# Patient Record
Sex: FEMALE | Race: WHITE | NOT HISPANIC OR LATINO | URBAN - METROPOLITAN AREA
[De-identification: names, ages, dates, MRNs, and addresses within clinical notes are randomized per-mention and may not be internally consistent; named-entity substitution may affect disease eponyms.]

---

## 2022-05-06 PROBLEM — E66.812 CLASS 2 SEVERE OBESITY DUE TO EXCESS CALORIES WITH SERIOUS COMORBIDITY AND BODY MASS INDEX (BMI) OF 37.0 TO 37.9 IN ADULT: Status: ACTIVE | Noted: 2022-05-06

## 2022-05-20 ENCOUNTER — OFFICE (OUTPATIENT)
Dept: URBAN - METROPOLITAN AREA CLINIC 64 | Facility: CLINIC | Age: 47
End: 2022-05-20

## 2022-05-20 VITALS
HEART RATE: 81 BPM | WEIGHT: 213 LBS | SYSTOLIC BLOOD PRESSURE: 129 MMHG | HEIGHT: 64 IN | DIASTOLIC BLOOD PRESSURE: 93 MMHG

## 2022-05-20 DIAGNOSIS — K85.90 ACUTE PANCREATITIS WITHOUT NECROSIS OR INFECTION, UNSPECIFIE: ICD-10-CM

## 2022-05-20 PROCEDURE — 99204 OFFICE O/P NEW MOD 45 MIN: CPT | Performed by: NURSE PRACTITIONER

## 2022-05-20 RX ORDER — COLESEVELAM HYDROCHLORIDE 625 MG/1
625 TABLET, FILM COATED ORAL
Qty: 60 | Refills: 11 | Status: COMPLETED
Start: 2022-05-20 | End: 2022-12-28

## 2022-05-30 ENCOUNTER — INPATIENT HOSPITAL (OUTPATIENT)
Dept: URBAN - METROPOLITAN AREA HOSPITAL 84 | Facility: HOSPITAL | Age: 47
End: 2022-05-30
Payer: COMMERCIAL

## 2022-05-30 DIAGNOSIS — R10.11 RIGHT UPPER QUADRANT PAIN: ICD-10-CM

## 2022-05-30 DIAGNOSIS — F17.200 NICOTINE DEPENDENCE, UNSPECIFIED, UNCOMPLICATED: ICD-10-CM

## 2022-05-30 DIAGNOSIS — R10.13 EPIGASTRIC PAIN: ICD-10-CM

## 2022-05-30 DIAGNOSIS — K29.50 UNSPECIFIED CHRONIC GASTRITIS WITHOUT BLEEDING: ICD-10-CM

## 2022-05-30 PROCEDURE — 43239 EGD BIOPSY SINGLE/MULTIPLE: CPT | Performed by: INTERNAL MEDICINE

## 2022-05-31 ENCOUNTER — INPATIENT HOSPITAL (OUTPATIENT)
Dept: URBAN - METROPOLITAN AREA HOSPITAL 84 | Facility: HOSPITAL | Age: 47
End: 2022-05-31
Payer: COMMERCIAL

## 2022-05-31 DIAGNOSIS — K85.90 ACUTE PANCREATITIS WITHOUT NECROSIS OR INFECTION, UNSPECIFIE: ICD-10-CM

## 2022-05-31 DIAGNOSIS — R10.11 RIGHT UPPER QUADRANT PAIN: ICD-10-CM

## 2022-05-31 DIAGNOSIS — Z72.0 TOBACCO USE: ICD-10-CM

## 2022-05-31 DIAGNOSIS — R10.13 EPIGASTRIC PAIN: ICD-10-CM

## 2022-05-31 DIAGNOSIS — K58.0 IRRITABLE BOWEL SYNDROME WITH DIARRHEA: ICD-10-CM

## 2022-05-31 PROCEDURE — 99231 SBSQ HOSP IP/OBS SF/LOW 25: CPT | Performed by: NURSE PRACTITIONER

## 2022-06-01 ENCOUNTER — INPATIENT HOSPITAL (OUTPATIENT)
Dept: URBAN - METROPOLITAN AREA HOSPITAL 84 | Facility: HOSPITAL | Age: 47
End: 2022-06-01
Payer: COMMERCIAL

## 2022-06-01 DIAGNOSIS — K85.90 ACUTE PANCREATITIS WITHOUT NECROSIS OR INFECTION, UNSPECIFIE: ICD-10-CM

## 2022-06-01 PROCEDURE — 99232 SBSQ HOSP IP/OBS MODERATE 35: CPT | Performed by: NURSE PRACTITIONER

## 2022-12-28 ENCOUNTER — OFFICE (OUTPATIENT)
Dept: URBAN - METROPOLITAN AREA CLINIC 64 | Facility: CLINIC | Age: 47
End: 2022-12-28
Payer: COMMERCIAL

## 2022-12-28 VITALS
HEIGHT: 64 IN | DIASTOLIC BLOOD PRESSURE: 70 MMHG | HEART RATE: 101 BPM | SYSTOLIC BLOOD PRESSURE: 98 MMHG | WEIGHT: 203 LBS

## 2022-12-28 DIAGNOSIS — R11.0 NAUSEA: ICD-10-CM

## 2022-12-28 DIAGNOSIS — R10.13 EPIGASTRIC PAIN: ICD-10-CM

## 2022-12-28 DIAGNOSIS — R19.7 DIARRHEA, UNSPECIFIED: ICD-10-CM

## 2022-12-28 DIAGNOSIS — K85.90 ACUTE PANCREATITIS WITHOUT NECROSIS OR INFECTION, UNSPECIFIE: ICD-10-CM

## 2022-12-28 PROCEDURE — 99214 OFFICE O/P EST MOD 30 MIN: CPT

## 2022-12-28 RX ORDER — AMITRIPTYLINE HYDROCHLORIDE 25 MG/1
25 TABLET, FILM COATED ORAL
Qty: 30 | Refills: 5 | Status: COMPLETED
Start: 2022-12-28 | End: 2023-12-22

## 2023-01-27 ENCOUNTER — INPATIENT HOSPITAL (OUTPATIENT)
Dept: URBAN - METROPOLITAN AREA HOSPITAL 84 | Facility: HOSPITAL | Age: 48
End: 2023-01-27

## 2023-01-27 DIAGNOSIS — Z90.49 ACQUIRED ABSENCE OF OTHER SPECIFIED PARTS OF DIGESTIVE TRACT: ICD-10-CM

## 2023-01-27 DIAGNOSIS — R19.7 DIARRHEA, UNSPECIFIED: ICD-10-CM

## 2023-01-27 DIAGNOSIS — F17.200 NICOTINE DEPENDENCE, UNSPECIFIED, UNCOMPLICATED: ICD-10-CM

## 2023-01-27 DIAGNOSIS — R11.2 NAUSEA WITH VOMITING, UNSPECIFIED: ICD-10-CM

## 2023-01-27 DIAGNOSIS — K85.90 ACUTE PANCREATITIS WITHOUT NECROSIS OR INFECTION, UNSPECIFIE: ICD-10-CM

## 2023-01-27 PROCEDURE — 99254 IP/OBS CNSLTJ NEW/EST MOD 60: CPT | Performed by: NURSE PRACTITIONER

## 2023-01-28 ENCOUNTER — INPATIENT HOSPITAL (OUTPATIENT)
Dept: URBAN - METROPOLITAN AREA HOSPITAL 84 | Facility: HOSPITAL | Age: 48
End: 2023-01-28

## 2023-01-28 DIAGNOSIS — K85.90 ACUTE PANCREATITIS WITHOUT NECROSIS OR INFECTION, UNSPECIFIE: ICD-10-CM

## 2023-01-28 PROCEDURE — 99232 SBSQ HOSP IP/OBS MODERATE 35: CPT | Performed by: NURSE PRACTITIONER

## 2023-02-09 PROBLEM — E66.811 CLASS 1 OBESITY DUE TO EXCESS CALORIES WITH SERIOUS COMORBIDITY AND BODY MASS INDEX (BMI) OF 34.0 TO 34.9 IN ADULT: Status: ACTIVE | Noted: 2022-05-06

## 2023-03-07 ENCOUNTER — ON CAMPUS - OUTPATIENT (OUTPATIENT)
Dept: URBAN - METROPOLITAN AREA HOSPITAL 85 | Facility: HOSPITAL | Age: 48
End: 2023-03-07

## 2023-03-07 DIAGNOSIS — K63.89 OTHER SPECIFIED DISEASES OF INTESTINE: ICD-10-CM

## 2023-03-07 DIAGNOSIS — K52.9 NONINFECTIVE GASTROENTERITIS AND COLITIS, UNSPECIFIED: ICD-10-CM

## 2023-03-07 PROCEDURE — 45380 COLONOSCOPY AND BIOPSY: CPT | Performed by: INTERNAL MEDICINE

## 2023-03-21 ENCOUNTER — OFFICE (OUTPATIENT)
Dept: URBAN - METROPOLITAN AREA CLINIC 64 | Facility: CLINIC | Age: 48
End: 2023-03-21

## 2023-03-21 VITALS
SYSTOLIC BLOOD PRESSURE: 112 MMHG | WEIGHT: 207 LBS | HEIGHT: 64 IN | HEART RATE: 89 BPM | DIASTOLIC BLOOD PRESSURE: 79 MMHG

## 2023-03-21 DIAGNOSIS — R10.13 EPIGASTRIC PAIN: ICD-10-CM

## 2023-03-21 DIAGNOSIS — R74.8 ABNORMAL LEVELS OF OTHER SERUM ENZYMES: ICD-10-CM

## 2023-03-21 DIAGNOSIS — K85.90 ACUTE PANCREATITIS WITHOUT NECROSIS OR INFECTION, UNSPECIFIE: ICD-10-CM

## 2023-03-21 DIAGNOSIS — R19.7 DIARRHEA, UNSPECIFIED: ICD-10-CM

## 2023-03-21 PROCEDURE — 99214 OFFICE O/P EST MOD 30 MIN: CPT | Performed by: INTERNAL MEDICINE

## 2023-03-21 RX ORDER — DICYCLOMINE HYDROCHLORIDE 10 MG/1
40 CAPSULE ORAL
Qty: 60 | Refills: 11 | Status: COMPLETED
Start: 2023-03-21 | End: 2023-12-22

## 2023-06-06 ENCOUNTER — OFFICE (OUTPATIENT)
Dept: URBAN - METROPOLITAN AREA CLINIC 64 | Facility: CLINIC | Age: 48
End: 2023-06-06
Payer: OTHER GOVERNMENT

## 2023-06-06 VITALS
SYSTOLIC BLOOD PRESSURE: 112 MMHG | HEART RATE: 82 BPM | WEIGHT: 212 LBS | HEIGHT: 64 IN | DIASTOLIC BLOOD PRESSURE: 79 MMHG

## 2023-06-06 DIAGNOSIS — R19.7 DIARRHEA, UNSPECIFIED: ICD-10-CM

## 2023-06-06 DIAGNOSIS — K85.90 ACUTE PANCREATITIS WITHOUT NECROSIS OR INFECTION, UNSPECIFIE: ICD-10-CM

## 2023-06-06 DIAGNOSIS — F17.200 NICOTINE DEPENDENCE, UNSPECIFIED, UNCOMPLICATED: ICD-10-CM

## 2023-06-06 DIAGNOSIS — R10.13 EPIGASTRIC PAIN: ICD-10-CM

## 2023-06-06 DIAGNOSIS — R11.0 NAUSEA: ICD-10-CM

## 2023-06-06 DIAGNOSIS — Z90.49 ACQUIRED ABSENCE OF OTHER SPECIFIED PARTS OF DIGESTIVE TRACT: ICD-10-CM

## 2023-06-06 PROCEDURE — 99214 OFFICE O/P EST MOD 30 MIN: CPT

## 2023-07-15 ENCOUNTER — INPATIENT HOSPITAL (OUTPATIENT)
Dept: URBAN - METROPOLITAN AREA HOSPITAL 84 | Facility: HOSPITAL | Age: 48
End: 2023-07-15
Payer: COMMERCIAL

## 2023-07-15 DIAGNOSIS — K85.90 ACUTE PANCREATITIS WITHOUT NECROSIS OR INFECTION, UNSPECIFIE: ICD-10-CM

## 2023-07-15 PROCEDURE — 99221 1ST HOSP IP/OBS SF/LOW 40: CPT | Performed by: NURSE PRACTITIONER

## 2023-08-26 ENCOUNTER — ON CAMPUS - OUTPATIENT (OUTPATIENT)
Dept: URBAN - METROPOLITAN AREA HOSPITAL 85 | Facility: HOSPITAL | Age: 48
End: 2023-08-26
Payer: COMMERCIAL

## 2023-08-26 DIAGNOSIS — Z90.49 ACQUIRED ABSENCE OF OTHER SPECIFIED PARTS OF DIGESTIVE TRACT: ICD-10-CM

## 2023-08-26 DIAGNOSIS — K59.00 CONSTIPATION, UNSPECIFIED: ICD-10-CM

## 2023-08-26 DIAGNOSIS — K86.1 OTHER CHRONIC PANCREATITIS: ICD-10-CM

## 2023-08-26 DIAGNOSIS — R10.9 UNSPECIFIED ABDOMINAL PAIN: ICD-10-CM

## 2023-08-26 PROCEDURE — 99214 OFFICE O/P EST MOD 30 MIN: CPT | Performed by: NURSE PRACTITIONER

## 2023-08-27 ENCOUNTER — ON CAMPUS - OUTPATIENT (OUTPATIENT)
Dept: URBAN - METROPOLITAN AREA HOSPITAL 85 | Facility: HOSPITAL | Age: 48
End: 2023-08-27
Payer: COMMERCIAL

## 2023-08-27 DIAGNOSIS — R10.9 UNSPECIFIED ABDOMINAL PAIN: ICD-10-CM

## 2023-08-27 DIAGNOSIS — K86.1 OTHER CHRONIC PANCREATITIS: ICD-10-CM

## 2023-08-27 DIAGNOSIS — Z90.49 ACQUIRED ABSENCE OF OTHER SPECIFIED PARTS OF DIGESTIVE TRACT: ICD-10-CM

## 2023-08-27 DIAGNOSIS — K59.00 CONSTIPATION, UNSPECIFIED: ICD-10-CM

## 2023-08-27 PROCEDURE — 99212 OFFICE O/P EST SF 10 MIN: CPT | Performed by: NURSE PRACTITIONER

## 2023-12-04 ENCOUNTER — ON CAMPUS - OUTPATIENT (OUTPATIENT)
Dept: URBAN - METROPOLITAN AREA HOSPITAL 85 | Facility: HOSPITAL | Age: 48
End: 2023-12-04
Payer: COMMERCIAL

## 2023-12-04 DIAGNOSIS — K29.50 UNSPECIFIED CHRONIC GASTRITIS WITHOUT BLEEDING: ICD-10-CM

## 2023-12-04 DIAGNOSIS — Z90.49 ACQUIRED ABSENCE OF OTHER SPECIFIED PARTS OF DIGESTIVE TRACT: ICD-10-CM

## 2023-12-04 DIAGNOSIS — K85.90 ACUTE PANCREATITIS WITHOUT NECROSIS OR INFECTION, UNSPECIFIE: ICD-10-CM

## 2023-12-04 DIAGNOSIS — K86.1 OTHER CHRONIC PANCREATITIS: ICD-10-CM

## 2023-12-04 PROCEDURE — 43259 EGD US EXAM DUODENUM/JEJUNUM: CPT | Performed by: INTERNAL MEDICINE

## 2023-12-22 ENCOUNTER — OFFICE (OUTPATIENT)
Dept: URBAN - METROPOLITAN AREA CLINIC 64 | Facility: CLINIC | Age: 48
End: 2023-12-22
Payer: COMMERCIAL

## 2023-12-22 VITALS
DIASTOLIC BLOOD PRESSURE: 82 MMHG | HEIGHT: 64 IN | WEIGHT: 221 LBS | SYSTOLIC BLOOD PRESSURE: 113 MMHG | HEART RATE: 78 BPM

## 2023-12-22 DIAGNOSIS — K86.1 OTHER CHRONIC PANCREATITIS: ICD-10-CM

## 2023-12-22 DIAGNOSIS — R19.4 CHANGE IN BOWEL HABIT: ICD-10-CM

## 2023-12-22 DIAGNOSIS — R11.2 NAUSEA WITH VOMITING, UNSPECIFIED: ICD-10-CM

## 2023-12-22 PROCEDURE — 99214 OFFICE O/P EST MOD 30 MIN: CPT

## 2023-12-22 RX ORDER — COLESEVELAM HYDROCHLORIDE 625 MG/1
3750 TABLET, COATED ORAL
Qty: 180 | Refills: 6 | Status: COMPLETED
Start: 2023-12-22 | End: 2024-07-01

## 2023-12-22 RX ORDER — ONDANSETRON 4 MG/1
12 TABLET, ORALLY DISINTEGRATING ORAL
Qty: 90 | Refills: 4 | Status: ACTIVE
Start: 2023-12-22

## 2023-12-22 RX ORDER — HYOSCYAMINE SULFATE 0.12 MG/1
0.5 TABLET ORAL; SUBLINGUAL
Qty: 60 | Refills: 5 | Status: ACTIVE
Start: 2023-12-22

## 2024-01-18 ENCOUNTER — INPATIENT HOSPITAL (OUTPATIENT)
Dept: URBAN - METROPOLITAN AREA HOSPITAL 84 | Facility: HOSPITAL | Age: 49
End: 2024-01-18
Payer: COMMERCIAL

## 2024-01-18 DIAGNOSIS — K76.0 FATTY (CHANGE OF) LIVER, NOT ELSEWHERE CLASSIFIED: ICD-10-CM

## 2024-01-18 DIAGNOSIS — K85.90 ACUTE PANCREATITIS WITHOUT NECROSIS OR INFECTION, UNSPECIFIE: ICD-10-CM

## 2024-01-18 DIAGNOSIS — Z90.49 ACQUIRED ABSENCE OF OTHER SPECIFIED PARTS OF DIGESTIVE TRACT: ICD-10-CM

## 2024-01-18 DIAGNOSIS — R11.2 NAUSEA WITH VOMITING, UNSPECIFIED: ICD-10-CM

## 2024-01-18 DIAGNOSIS — R74.01 ELEVATION OF LEVELS OF LIVER TRANSAMINASE LEVELS: ICD-10-CM

## 2024-01-18 DIAGNOSIS — K86.1 OTHER CHRONIC PANCREATITIS: ICD-10-CM

## 2024-01-18 PROCEDURE — 99223 1ST HOSP IP/OBS HIGH 75: CPT | Performed by: NURSE PRACTITIONER

## 2024-01-19 ENCOUNTER — ON CAMPUS - OUTPATIENT (OUTPATIENT)
Dept: URBAN - METROPOLITAN AREA HOSPITAL 85 | Facility: HOSPITAL | Age: 49
End: 2024-01-19
Payer: COMMERCIAL

## 2024-01-19 DIAGNOSIS — K76.0 FATTY (CHANGE OF) LIVER, NOT ELSEWHERE CLASSIFIED: ICD-10-CM

## 2024-01-19 DIAGNOSIS — Z90.49 ACQUIRED ABSENCE OF OTHER SPECIFIED PARTS OF DIGESTIVE TRACT: ICD-10-CM

## 2024-01-19 DIAGNOSIS — K85.90 ACUTE PANCREATITIS WITHOUT NECROSIS OR INFECTION, UNSPECIFIE: ICD-10-CM

## 2024-01-19 DIAGNOSIS — R10.10 UPPER ABDOMINAL PAIN, UNSPECIFIED: ICD-10-CM

## 2024-01-19 DIAGNOSIS — K86.1 OTHER CHRONIC PANCREATITIS: ICD-10-CM

## 2024-01-19 DIAGNOSIS — R94.5 ABNORMAL RESULTS OF LIVER FUNCTION STUDIES: ICD-10-CM

## 2024-01-19 PROCEDURE — 99232 SBSQ HOSP IP/OBS MODERATE 35: CPT | Performed by: NURSE PRACTITIONER

## 2024-03-20 ENCOUNTER — OFFICE (OUTPATIENT)
Dept: URBAN - METROPOLITAN AREA CLINIC 64 | Facility: CLINIC | Age: 49
End: 2024-03-20
Payer: MEDICAID

## 2024-03-20 VITALS
SYSTOLIC BLOOD PRESSURE: 115 MMHG | HEART RATE: 93 BPM | HEIGHT: 64 IN | DIASTOLIC BLOOD PRESSURE: 84 MMHG | WEIGHT: 219 LBS

## 2024-03-20 DIAGNOSIS — F17.290 NICOTINE DEPENDENCE, OTHER TOBACCO PRODUCT, UNCOMPLICATED: ICD-10-CM

## 2024-03-20 DIAGNOSIS — K86.1 OTHER CHRONIC PANCREATITIS: ICD-10-CM

## 2024-03-20 DIAGNOSIS — R19.7 DIARRHEA, UNSPECIFIED: ICD-10-CM

## 2024-03-20 DIAGNOSIS — R10.13 EPIGASTRIC PAIN: ICD-10-CM

## 2024-03-20 DIAGNOSIS — R11.2 NAUSEA WITH VOMITING, UNSPECIFIED: ICD-10-CM

## 2024-03-20 PROCEDURE — 99214 OFFICE O/P EST MOD 30 MIN: CPT

## 2024-03-20 RX ORDER — PANTOPRAZOLE 40 MG/1
40 TABLET, DELAYED RELEASE ORAL
Qty: 30 | Refills: 11 | Status: ACTIVE
Start: 2022-05-26

## 2024-03-20 RX ORDER — ONDANSETRON 4 MG/1
12 TABLET, ORALLY DISINTEGRATING ORAL
Qty: 90 | Refills: 4 | Status: ACTIVE
Start: 2023-12-22

## 2024-03-20 RX ORDER — FAMOTIDINE 40 MG/1
40 TABLET, FILM COATED ORAL
Qty: 30 | Refills: 11 | Status: ACTIVE

## 2024-03-20 RX ORDER — COLESEVELAM HYDROCHLORIDE 625 MG/1
3750 TABLET, COATED ORAL
Qty: 180 | Refills: 6 | Status: COMPLETED
Start: 2023-12-22 | End: 2024-07-01

## 2024-05-12 ENCOUNTER — HOSPITAL ENCOUNTER (EMERGENCY)
Facility: HOSPITAL | Age: 49
Discharge: HOME | End: 2024-05-12
Attending: EMERGENCY MEDICINE | Admitting: EMERGENCY MEDICINE
Payer: COMMERCIAL

## 2024-05-12 VITALS
OXYGEN SATURATION: 98 % | HEIGHT: 64 IN | RESPIRATION RATE: 20 BRPM | TEMPERATURE: 98 F | SYSTOLIC BLOOD PRESSURE: 134 MMHG | HEART RATE: 75 BPM | BODY MASS INDEX: 36.51 KG/M2 | DIASTOLIC BLOOD PRESSURE: 83 MMHG | WEIGHT: 213.85 LBS

## 2024-05-12 DIAGNOSIS — K86.1 CHRONIC PANCREATITIS, UNSPECIFIED PANCREATITIS TYPE: ICD-10-CM

## 2024-05-12 DIAGNOSIS — R10.10 PAIN OF UPPER ABDOMEN: Primary | ICD-10-CM

## 2024-05-12 LAB
ALBUMIN SERPL-MCNC: 3.7 G/DL (ref 3.5–5.2)
ALBUMIN/GLOB SERPL: 1.1 G/DL
ALP SERPL-CCNC: 116 U/L (ref 39–117)
ALT SERPL W P-5'-P-CCNC: 27 U/L (ref 1–33)
ANION GAP SERPL CALCULATED.3IONS-SCNC: 11 MMOL/L (ref 5–15)
AST SERPL-CCNC: 17 U/L (ref 1–32)
BASOPHILS # BLD AUTO: 0.01 10*3/MM3 (ref 0–0.2)
BASOPHILS NFR BLD AUTO: 0.1 % (ref 0–1.5)
BILIRUB SERPL-MCNC: 0.3 MG/DL (ref 0–1.2)
BILIRUB UR QL STRIP: NEGATIVE
BUN SERPL-MCNC: 6 MG/DL (ref 6–20)
BUN/CREAT SERPL: 7.3 (ref 7–25)
CALCIUM SPEC-SCNC: 9.2 MG/DL (ref 8.6–10.5)
CHLORIDE SERPL-SCNC: 106 MMOL/L (ref 98–107)
CLARITY UR: CLEAR
CO2 SERPL-SCNC: 26 MMOL/L (ref 22–29)
COLOR UR: YELLOW
CREAT SERPL-MCNC: 0.82 MG/DL (ref 0.57–1)
DEPRECATED RDW RBC AUTO: 43.8 FL (ref 37–54)
EGFRCR SERPLBLD CKD-EPI 2021: 89.5 ML/MIN/1.73
EOSINOPHIL # BLD AUTO: 0.23 10*3/MM3 (ref 0–0.4)
EOSINOPHIL NFR BLD AUTO: 2.8 % (ref 0.3–6.2)
ERYTHROCYTE [DISTWIDTH] IN BLOOD BY AUTOMATED COUNT: 13.2 % (ref 12.3–15.4)
GLOBULIN UR ELPH-MCNC: 3.5 GM/DL
GLUCOSE SERPL-MCNC: 95 MG/DL (ref 65–99)
GLUCOSE UR STRIP-MCNC: NEGATIVE MG/DL
HCT VFR BLD AUTO: 35.9 % (ref 34–46.6)
HGB BLD-MCNC: 11.4 G/DL (ref 12–15.9)
HGB UR QL STRIP.AUTO: NEGATIVE
IMM GRANULOCYTES # BLD AUTO: 0.04 10*3/MM3 (ref 0–0.05)
IMM GRANULOCYTES NFR BLD AUTO: 0.5 % (ref 0–0.5)
KETONES UR QL STRIP: ABNORMAL
LEUKOCYTE ESTERASE UR QL STRIP.AUTO: NEGATIVE
LIPASE SERPL-CCNC: 48 U/L (ref 13–60)
LYMPHOCYTES # BLD AUTO: 2.38 10*3/MM3 (ref 0.7–3.1)
LYMPHOCYTES NFR BLD AUTO: 29 % (ref 19.6–45.3)
MCH RBC QN AUTO: 28.6 PG (ref 26.6–33)
MCHC RBC AUTO-ENTMCNC: 31.8 G/DL (ref 31.5–35.7)
MCV RBC AUTO: 90 FL (ref 79–97)
MONOCYTES # BLD AUTO: 0.6 10*3/MM3 (ref 0.1–0.9)
MONOCYTES NFR BLD AUTO: 7.3 % (ref 5–12)
NEUTROPHILS NFR BLD AUTO: 4.95 10*3/MM3 (ref 1.7–7)
NEUTROPHILS NFR BLD AUTO: 60.3 % (ref 42.7–76)
NITRITE UR QL STRIP: NEGATIVE
NRBC BLD AUTO-RTO: 0 /100 WBC (ref 0–0.2)
PH UR STRIP.AUTO: 7.5 [PH] (ref 5–8)
PLATELET # BLD AUTO: 255 10*3/MM3 (ref 140–450)
PMV BLD AUTO: 11.2 FL (ref 6–12)
POTASSIUM SERPL-SCNC: 3.6 MMOL/L (ref 3.5–5.2)
PROT SERPL-MCNC: 7.2 G/DL (ref 6–8.5)
PROT UR QL STRIP: NEGATIVE
RBC # BLD AUTO: 3.99 10*6/MM3 (ref 3.77–5.28)
SODIUM SERPL-SCNC: 143 MMOL/L (ref 136–145)
SP GR UR STRIP: 1.02 (ref 1–1.03)
UROBILINOGEN UR QL STRIP: ABNORMAL
WBC NRBC COR # BLD AUTO: 8.21 10*3/MM3 (ref 3.4–10.8)

## 2024-05-12 PROCEDURE — 85025 COMPLETE CBC W/AUTO DIFF WBC: CPT | Performed by: NURSE PRACTITIONER

## 2024-05-12 PROCEDURE — 96374 THER/PROPH/DIAG INJ IV PUSH: CPT

## 2024-05-12 PROCEDURE — 25010000002 HYDROMORPHONE 1 MG/ML SOLUTION: Performed by: NURSE PRACTITIONER

## 2024-05-12 PROCEDURE — 99283 EMERGENCY DEPT VISIT LOW MDM: CPT

## 2024-05-12 PROCEDURE — 96375 TX/PRO/DX INJ NEW DRUG ADDON: CPT

## 2024-05-12 PROCEDURE — 25010000002 ONDANSETRON PER 1 MG: Performed by: NURSE PRACTITIONER

## 2024-05-12 PROCEDURE — 81003 URINALYSIS AUTO W/O SCOPE: CPT | Performed by: NURSE PRACTITIONER

## 2024-05-12 PROCEDURE — 80053 COMPREHEN METABOLIC PANEL: CPT | Performed by: NURSE PRACTITIONER

## 2024-05-12 PROCEDURE — 83690 ASSAY OF LIPASE: CPT | Performed by: NURSE PRACTITIONER

## 2024-05-12 RX ORDER — ONDANSETRON 2 MG/ML
4 INJECTION INTRAMUSCULAR; INTRAVENOUS ONCE
Status: COMPLETED | OUTPATIENT
Start: 2024-05-12 | End: 2024-05-12

## 2024-05-12 RX ORDER — SODIUM CHLORIDE 0.9 % (FLUSH) 0.9 %
10 SYRINGE (ML) INJECTION AS NEEDED
Status: DISCONTINUED | OUTPATIENT
Start: 2024-05-12 | End: 2024-05-12 | Stop reason: HOSPADM

## 2024-05-12 RX ADMIN — HYDROMORPHONE HYDROCHLORIDE 0.5 MG: 1 INJECTION, SOLUTION INTRAMUSCULAR; INTRAVENOUS; SUBCUTANEOUS at 17:53

## 2024-05-12 RX ADMIN — ONDANSETRON 4 MG: 2 INJECTION INTRAMUSCULAR; INTRAVENOUS at 17:53

## 2024-05-12 NOTE — ED NOTES
No nausea/vomiting/diarrhea at this time. No acute distress, vitals stable, s/o at bedside and pt. Aware she is being discharged home.

## 2024-05-12 NOTE — DISCHARGE INSTRUCTIONS
Continue follow-up with your PCP and gastroenterologist  Return to ED for new or worsening symptoms

## 2024-05-12 NOTE — ED PROVIDER NOTES
Subjective   Chief Complaint   Patient presents with    Abdominal Pain     Abd pain, pt was admitted to [facility] with Pancreatitis and released Friday. Pain has not gone awau         History of Present Illness  Patient is a 48-year-old female with a history of chronic pancreatitis, recently admitted to [facility] for acute pancreatitis.  States that she has had worsening pain in the upper abdomen, nausea, vomiting diarrhea.  No hematemesis, emesis.  No hematochezia or melena.  She reports chills but no fever.  No urinary symptoms  Review of Systems   Constitutional:  Negative for chills and fever.   Respiratory:  Negative for shortness of breath.    Cardiovascular:  Negative for chest pain.   Gastrointestinal:  Positive for abdominal pain, diarrhea, nausea and vomiting. Negative for blood in stool.   Genitourinary:  Negative for dysuria and flank pain.   Musculoskeletal:  Negative for back pain and neck pain.       Past Medical History:   Diagnosis Date    Chronic back pain     Endometriosis     Fibromyalgia     Hyperlipidemia     Hypertension     Injury of back     Pancreatitis        Allergies   Allergen Reactions    Roxicodone [Oxycodone Hcl] Itching and Nausea Only    Toradol [Ketorolac Tromethamine] Itching and Irritability    Gabapentin Nausea And Vomiting    Naproxen Nausea And Vomiting    Pregabalin Hives    Morphine Itching       Past Surgical History:   Procedure Laterality Date    ABDOMINAL SURGERY       SECTION      CHOLECYSTECTOMY WITH INTRAOPERATIVE CHOLANGIOGRAM N/A 2022    Procedure: CHOLECYSTECTOMY LAPAROSCOPIC INTRAOPERATIVE CHOLANGIOGRAM;  Surgeon: Robert Dale MD;  Location: [facility] MAIN OR;  Service: General;  Laterality: N/A;    COLONOSCOPY N/A 3/7/2023    Procedure: COLONOSCOPY with ileum and large intestine random biopsies R/O microscopic colitis;  Surgeon: NAVIN Schneider MD;  Location: [facility] ENDOSCOPY;  Service: Gastroenterology;  Laterality: N/A;   hemorrhoids    ENDOSCOPY N/A 5/30/2022    Procedure: ESOPHAGOGASTRODUODENOSCOPY WITH BIOPSY;  Surgeon: NAVIN Schneider MD;  Location: [facility] ENDOSCOPY;  Service: Gastroenterology;  Laterality: N/A;    HYSTERECTOMY      UPPER ENDOSCOPIC ULTRASOUND W/ FNA N/A 12/4/2023    Procedure: ESOPHAGOGASTRODUODENOSCOPY WITH ENDOSCOPIC ULTRASOUND;  Surgeon: Malik King MD;  Location: [facility] ENDOSCOPY;  Service: Gastroenterology;  Laterality: N/A;  POST: CHRONIC PANCREATITIS       Family History   Problem Relation Age of Onset    Cancer Mother     Heart disease Father        Social History     Socioeconomic History    Marital status:    Tobacco Use    Smoking status: Every Day     Current packs/day: 1.00     Average packs/day: 1 pack/day for 33.9 years (33.9 ttl pk-yrs)     Types: Cigarettes     Start date: 6/5/1990    Smokeless tobacco: Never   Vaping Use    Vaping status: Never Used   Substance and Sexual Activity    Alcohol use: Never    Drug use: Never    Sexual activity: Defer           Objective   Physical Exam  Vitals and nursing note reviewed.   Constitutional:       Appearance: She is well-developed. She is not toxic-appearing.   HENT:      Head: Normocephalic and atraumatic.      Mouth/Throat:      Mouth: Mucous membranes are moist.      Pharynx: Oropharynx is clear.   Eyes:      Extraocular Movements: Extraocular movements intact.      Pupils: Pupils are equal, round, and reactive to light.   Cardiovascular:      Rate and Rhythm: Normal rate and regular rhythm.      Heart sounds: No murmur heard.     No friction rub. No gallop.   Pulmonary:      Effort: Pulmonary effort is normal.      Breath sounds: Normal breath sounds.   Abdominal:      General: Abdomen is flat. Bowel sounds are normal.      Palpations: Abdomen is soft.      Tenderness: There is abdominal tenderness in the right upper quadrant, epigastric area and left upper quadrant.   Skin:     General: Skin is warm and dry.      Capillary Refill:  "Capillary refill takes less than 2 seconds.   Neurological:      General: No focal deficit present.      Mental Status: She is alert and oriented to person, place, and time.         Procedures           ED Course  ED Course as of 05/12/24 1933   Sun May 12, 2024   1833 Patient requested something to drink. [LB]   1903 Patient tolerating oral fluids.  Wants to go home. [LB]      ED Course User Index  [LB] Stephanie Jose APRN      /82 (BP Location: Left arm, Patient Position: Sitting)   Pulse 85   Temp 98.4 °F (36.9 °C) (Oral)   Resp 18   Ht 162.6 cm (64\")   Wt 97 kg (213 lb 13.5 oz)   SpO2 98%   BMI 36.71 kg/m²   Medications   sodium chloride 0.9 % flush 10 mL (has no administration in time range)   HYDROmorphone (DILAUDID) injection 0.5 mg (0.5 mg Intravenous Given 5/12/24 1753)   ondansetron (ZOFRAN) injection 4 mg (4 mg Intravenous Given 5/12/24 1753)     No radiology results for the last day  Lab Results (last 24 hours)       Procedure Component Value Units Date/Time    Urinalysis With Microscopic If Indicated (No Culture) - Urine, Clean Catch [[id number]]  (Abnormal) Collected: 05/12/24 1704    Specimen: Urine, Clean Catch Updated: 05/12/24 1711     Color, UA Yellow     Appearance, UA Clear     pH, UA 7.5     Specific Gravity, UA 1.018     Glucose, UA Negative     Ketones, UA Trace     Bilirubin, UA Negative     Blood, UA Negative     Protein, UA Negative     Leuk Esterase, UA Negative     Nitrite, UA Negative     Urobilinogen, UA 1.0 E.U./dL    Narrative:      Urine microscopic not indicated.    CBC & Differential [[id number]]  (Abnormal) Collected: 05/12/24 1722    Specimen: Blood Updated: 05/12/24 1724    Narrative:      The following orders were created for panel order CBC & Differential.  Procedure                               Abnormality         Status                     ---------                               -----------         ------                     CBC Auto " Differential[[id number]]        Abnormal            Final result                 Please view results for these tests on the individual orders.    Lipase [[id number]]  (Normal) Collected: 05/12/24 1722    Specimen: Blood Updated: 05/12/24 1744     Lipase 48 U/L     CBC Auto Differential [[id number]]  (Abnormal) Collected: 05/12/24 1722    Specimen: Blood Updated: 05/12/24 1724     WBC 8.21 10*3/mm3      RBC 3.99 10*6/mm3      Hemoglobin 11.4 g/dL      Hematocrit 35.9 %      MCV 90.0 fL      MCH 28.6 pg      MCHC 31.8 g/dL      RDW 13.2 %      RDW-SD 43.8 fl      MPV 11.2 fL      Platelets 255 10*3/mm3      Neutrophil % 60.3 %      Lymphocyte % 29.0 %      Monocyte % 7.3 %      Eosinophil % 2.8 %      Basophil % 0.1 %      Immature Grans % 0.5 %      Neutrophils, Absolute 4.95 10*3/mm3      Lymphocytes, Absolute 2.38 10*3/mm3      Monocytes, Absolute 0.60 10*3/mm3      Eosinophils, Absolute 0.23 10*3/mm3      Basophils, Absolute 0.01 10*3/mm3      Immature Grans, Absolute 0.04 10*3/mm3      nRBC 0.0 /100 WBC     Comprehensive Metabolic Panel [[id number]] Collected: 05/12/24 1756    Specimen: Blood Updated: 05/12/24 1825     Glucose 95 mg/dL      BUN 6 mg/dL      Creatinine 0.82 mg/dL      Sodium 143 mmol/L      Potassium 3.6 mmol/L      Chloride 106 mmol/L      CO2 26.0 mmol/L      Calcium 9.2 mg/dL      Total Protein 7.2 g/dL      Albumin 3.7 g/dL      ALT (SGPT) 27 U/L      AST (SGOT) 17 U/L      Alkaline Phosphatase 116 U/L      Total Bilirubin 0.3 mg/dL      Globulin 3.5 gm/dL      A/G Ratio 1.1 g/dL      BUN/Creatinine Ratio 7.3     Anion Gap 11.0 mmol/L      eGFR 89.5 mL/min/1.73     Narrative:      GFR Normal >60  Chronic Kidney Disease <60  Kidney Failure <15                                                   Medical Decision Making  Problems Addressed:  Chronic pancreatitis, unspecified pancreatitis type: complicated acute illness or injury  Pain of upper abdomen: complicated acute illness or injury    Amount  and/or Complexity of Data Reviewed  Labs: ordered.    Risk  Prescription drug management.    Chart Review: [facility] 5/7/2024 for acute on chronic pancreatitis.  CT abdomen pelvis showed pancreatitis.  Lipase greater than 2500.  Imaging: Abdominal exam is benign.  She just had recent CTs with previous admission.  Lab work reassuring.  Will defer at this time  Pt was Placed on appropriate monitoring.  Differential diagnoses considered for patient presentation, this list is not all inclusive of diagnoses considered: Pancreatitis, pseudocyst, gastritis  Patient presents to the ED for the above complaint, underwent the above, exam and workup.  White blood cell count is normal.  Lipase is 48.  CMP reviewed.  UA reviewed.  No signs of sepsis or systemic illness.  Patient wants to go home.  Advised pancreatitis diet will follow-up with PCP.      Disposition: I discussed my findings, plan of care, discharge instructions, the importance of follow up with their PCP/ and or specialist for repeat evaluation and to discuss any abnormal findings in labs or imaging that warrant further outpatient evaluation. We discussed that although a definitive diagnosis is not always found in the ED, it is believed emergent conditions have been ruled out, and patient is safe for discharge at this time.  We discussed return precautions for the emergency department.  Patient verbalizes understandings, and agrees with current plan of care.  Note Disclaimer: At [facility], we believe that sharing information builds trust and better relationships. You are receiving this note because you recently visited [facility]. It is possible you will see health information before a provider has talked with you about it. This kind of information can be easy to misunderstand. To help you fully understand what it means for your health, we urge you to discuss this note with your provider  Note dictated utilizing Dragon  Dictation.  Appropriate PPE worn during patient interactions.        Final diagnoses:   Pain of upper abdomen   Chronic pancreatitis, unspecified pancreatitis type       ED Disposition  ED Disposition       ED Disposition   Discharge    Condition   Stable    Comment   --               [facility] EMERGENCY DEPARTMENT  [facility] [facility] [address] [phone]    As needed, If symptoms worsen    Anita Blanco MD  [address] [place], [place] [id number] [phone]               Medication List      No changes were made to your prescriptions during this visit.            Stephanie Jose, APRN  05/12/24 1935

## 2024-05-13 ENCOUNTER — TRANSITIONAL CARE MANAGEMENT TELEPHONE ENCOUNTER (OUTPATIENT)
Dept: FAMILY MEDICINE CLINIC | Facility: CLINIC | Age: 49
End: 2024-05-13
Payer: COMMERCIAL

## 2024-05-16 PROBLEM — R19.4 CHANGE IN BOWEL HABIT: Status: ACTIVE | Noted: 2024-05-16

## 2024-05-16 NOTE — ASSESSMENT & PLAN NOTE
Patient's (There is no height or weight on file to calculate BMI.) indicates that they are morbidly/severely obese (BMI > 40 or > 35 with obesity - related health condition) with health conditions that include hypertension, dyslipidemias, and GERD . Weight is unchanged. BMI  is above average; BMI management plan is completed. We discussed portion control and increasing exercise.

## 2024-05-16 NOTE — PATIENT INSTRUCTIONS
Health Maintenance Due   Topic Date Due    MAMMOGRAM  Never done    BMI FOLLOWUP  05/06/2023    COVID-19 Vaccine (4 - 2023-24 season) 09/01/2023    ANNUAL PHYSICAL  12/16/2023

## 2024-05-17 ENCOUNTER — OFFICE VISIT (OUTPATIENT)
Dept: FAMILY MEDICINE CLINIC | Facility: CLINIC | Age: 49
End: 2024-05-17
Payer: COMMERCIAL

## 2024-05-17 VITALS
TEMPERATURE: 98.6 F | HEIGHT: 64 IN | WEIGHT: 214 LBS | OXYGEN SATURATION: 99 % | BODY MASS INDEX: 36.54 KG/M2 | HEART RATE: 80 BPM | SYSTOLIC BLOOD PRESSURE: 128 MMHG | DIASTOLIC BLOOD PRESSURE: 84 MMHG

## 2024-05-17 DIAGNOSIS — F33.1 MAJOR DEPRESSIVE DISORDER, RECURRENT, MODERATE: ICD-10-CM

## 2024-05-17 DIAGNOSIS — F17.200 TOBACCO DEPENDENCE SYNDROME: ICD-10-CM

## 2024-05-17 DIAGNOSIS — K85.00 IDIOPATHIC ACUTE PANCREATITIS, UNSPECIFIED COMPLICATION STATUS: ICD-10-CM

## 2024-05-17 DIAGNOSIS — I10 PRIMARY HYPERTENSION: ICD-10-CM

## 2024-05-17 DIAGNOSIS — Z00.01 ENCOUNTER FOR ANNUAL GENERAL MEDICAL EXAMINATION WITH ABNORMAL FINDINGS IN ADULT: Primary | ICD-10-CM

## 2024-05-17 DIAGNOSIS — R56.9 CONVULSIONS, UNSPECIFIED CONVULSION TYPE: ICD-10-CM

## 2024-05-17 DIAGNOSIS — Z12.31 ENCOUNTER FOR SCREENING MAMMOGRAM FOR MALIGNANT NEOPLASM OF BREAST: ICD-10-CM

## 2024-05-17 DIAGNOSIS — Q07.00 ARNOLD-CHIARI MALFORMATION: ICD-10-CM

## 2024-05-17 DIAGNOSIS — E55.9 VITAMIN D DEFICIENCY: ICD-10-CM

## 2024-05-17 DIAGNOSIS — G93.89 MASS OF BRAIN: ICD-10-CM

## 2024-05-17 DIAGNOSIS — E78.5 HYPERLIPIDEMIA, UNSPECIFIED HYPERLIPIDEMIA TYPE: ICD-10-CM

## 2024-05-17 DIAGNOSIS — G43.E09 CHRONIC MIGRAINE WITH AURA WITHOUT STATUS MIGRAINOSUS, NOT INTRACTABLE: ICD-10-CM

## 2024-05-17 DIAGNOSIS — E66.01 CLASS 2 SEVERE OBESITY DUE TO EXCESS CALORIES WITH SERIOUS COMORBIDITY AND BODY MASS INDEX (BMI) OF 36.0 TO 36.9 IN ADULT: ICD-10-CM

## 2024-05-17 DIAGNOSIS — E03.9 HYPOTHYROIDISM, UNSPECIFIED TYPE: ICD-10-CM

## 2024-05-17 NOTE — PROGRESS NOTES
Subjective   Dianna Claros is a 48 y.o. female presents for   Chief Complaint   Patient presents with    Annual Exam     No concerns       Health Maintenance Due   Topic Date Due    MAMMOGRAM  Never done    COVID-19 Vaccine (4 - 2023-24 season) 09/01/2023    ANNUAL PHYSICAL  12/16/2023     Patient was here for annual exam and was advised to wear sunscreen and a seatbelt.    We did note that she had not been back to neurology or the neurosurgeon and she was overdue to get a repeat MRI of her cervical spine and her brain.  These were ordered as well as referrals back to the specialists.  History of Present Illness   History of Present Illness  The patient is a 48-year-old female who is here today for her annual wellness exam and encounter for mammogram and tobacco-dependent syndrome, mass of the brain, major depressive disorder, hypothyroidism, primary hypertension, hyperlipidemia, chronic migraines, convulsions, class 2 severe obesity due to excess calories with serious comorbidities, idiopathic acute pancreatitis, unspecified complications, and vitamin D deficiency.    The patient continues to smoke, albeit at a gradual reduction. She has not recently undergone a mammogram, but expresses a desire to have it done at [facility], a facility she has not previously undergone. She practices safety measures such as wearing sunscreen and a seatbelt. Her mood and depression are stable, with no reported suicidal or homicidal ideations. She has not observed any increase in dry skin or hair loss. She is mindful of her saturated fat intake. She has not experienced any seizures. Her pancreatitis remains undetermined. She is under the care of a gastroenterologist and has an appointment with Meliza scheduled for Monday. Her bowel movements are regular since her cholecystectomy, with occasional diarrhea reported. She denies hematuria, dysuria, unusual vaginal discharge, fever, chills, night sweats, or weight  "loss.    She has not recently consulted with Dr. Mccann regarding the brain mass. It has been a significant period since her last imaging. She is unaware of the location of the mass.    She was admitted to [facility] for pancreatitis and was discharged last Friday. She returned on Mother's Day Sunday due to persistent pain, but no issues have been reported since then. She received a medication to alleviate her pain.    She reports minimal headaches.    Supplemental Information  She denies any trouble with swallowing or digestion. She takes pantoprazole for heartburn. She denies any snoring. She has a little bit of wheezing but not coughing up. She denies any chest pressure or heart flutters.   She is allergic to FLU VACCINE and PAIN PILLS. She is allergic to MORPHINE, TORADOL, GABAPENTIN, CELEXA, and Roxicodone.    Vitals:    05/17/24 1500 05/17/24 1506   BP: 130/82 128/84   BP Location: Left arm Right arm   Patient Position: Sitting Sitting   Cuff Size: Large Adult Large Adult   Pulse: 80    Temp: 98.6 °F (37 °C)    TempSrc: Tympanic    SpO2: 99%    Weight: 97.1 kg (214 lb)    Height: 162.6 cm (64.02\")      Body mass index is 36.71 kg/m².    Current Outpatient Medications on File Prior to Visit   Medication Sig Dispense Refill    acetaminophen (TYLENOL) 325 MG tablet Take 2 tablets by mouth Every 6 (Six) Hours As Needed for Mild Pain.      colesevelam (WELCHOL) 625 MG tablet Take 1 tablet by mouth 2 (Two) Times a Day With Meals.      famotidine (PEPCID) 40 mg/5 mL suspension Take 5 mL by mouth 2 (Two) Times a Day.      hyoscyamine (LEVSIN) 0.125 MG SL tablet Place 1 tablet under the tongue Every 6 (Six) Hours As Needed for Cramping.      ondansetron ODT (ZOFRAN-ODT) 4 MG disintegrating tablet Place 1 tablet on the tongue Every 8 (Eight) Hours As Needed for Nausea or Vomiting.      Pancrelipase, Lip-Prot-Amyl, (Zenpep) 79570-09956 units capsule delayed-release particles Take 2 capsules by mouth 3 " (Three) Times a Day With Meals.      prochlorperazine (COMPAZINE) 10 MG tablet Take 1 tablet by mouth Every 6 (Six) Hours As Needed for Nausea or Vomiting. 12 tablet 0    [DISCONTINUED] HYDROcodone-acetaminophen (Norco)  MG per tablet Take 1 tablet by mouth Every 6 (Six) Hours As Needed for Moderate Pain. (Patient not taking: Reported on 5/17/2024) 15 tablet 0    [DISCONTINUED] saccharomyces boulardii (FLORASTOR) 250 MG capsule Take 2 capsules by mouth 2 (Two) Times a Day. (Patient not taking: Reported on 5/17/2024) 120 capsule 2     No current facility-administered medications on file prior to visit.       The following portions of the patient's history were reviewed and updated as appropriate: allergies, current medications, past family history, past medical history, past social history, past surgical history, and problem list.    Review of Systems   Gastrointestinal:  Positive for abdominal pain, nausea and vomiting.   Neurological:  Positive for headache.   Psychiatric/Behavioral:  Positive for depressed mood.        Objective   Physical Exam  Vitals reviewed.   Constitutional:       General: She is not in acute distress.     Appearance: She is well-developed. She is obese. She is not ill-appearing or toxic-appearing.   HENT:      Head: Normocephalic and atraumatic.      Right Ear: Tympanic membrane, ear canal and external ear normal.      Left Ear: Tympanic membrane, ear canal and external ear normal.      Nose: Nose normal.      Mouth/Throat:      Mouth: Mucous membranes are moist.      Pharynx: No posterior oropharyngeal erythema.   Eyes:      Extraocular Movements: Extraocular movements intact.      Conjunctiva/sclera: Conjunctivae normal.      Pupils: Pupils are equal, round, and reactive to light.   Cardiovascular:      Rate and Rhythm: Normal rate and regular rhythm.      Heart sounds: Normal heart sounds.   Pulmonary:      Effort: Pulmonary effort is normal.      Breath sounds: Wheezing present.       Comments: Decreased breath sounds bilaterally  Abdominal:      General: Bowel sounds are normal. There is no distension.      Palpations: Abdomen is soft. There is no mass.      Tenderness: There is no abdominal tenderness.   Musculoskeletal:         General: Normal range of motion.      Cervical back: Neck supple.   Skin:     General: Skin is warm.      Findings: No rash.   Neurological:      General: No focal deficit present.      Mental Status: She is alert and oriented to person, place, and time.   Psychiatric:         Mood and Affect: Mood normal.         Behavior: Behavior normal.       Physical Exam  Vital Signs  The patient's blood pressure is 130/82, 128/84.    PHQ-9 Total Score: 0  Results  Imaging  CT of the head showed no evidence of any acute intracranial abnormality. MRI of the brain showed Chiari I malformation, an area on the left parietal area suggestive of a meningioma, and a cyst in the right maxillary sinus area.         Assessment & Plan   Diagnoses and all orders for this visit:    1. Encounter for annual general medical examination with abnormal findings in adult (Primary)  -     CBC Auto Differential; Future    2. Encounter for screening mammogram for malignant neoplasm of breast  -     Mammo Screening Digital Tomosynthesis Bilateral With CAD; Future    3. Tobacco dependence syndrome    4. Mass of brain  -     MRI Brain With & Without Contrast; Future    5. Major depressive disorder, recurrent, moderate  -     Magnesium; Future  -     Vitamin B12; Future    6. Hypothyroidism, unspecified type  -     TSH Rfx On Abnormal To Free T4; Future    7. Primary hypertension  -     Comprehensive Metabolic Panel; Future    8. Hyperlipidemia, unspecified hyperlipidemia type  -     Lipid Panel; Future    9. Chronic migraine with aura without status migrainosus, not intractable    10. Convulsions, unspecified convulsion type    11. Class 2 severe obesity due to excess calories with serious comorbidity and  body mass index (BMI) of 36.0 to 36.9 in adult  Assessment & Plan:  Patient's (There is no height or weight on file to calculate BMI.) indicates that they are morbidly/severely obese (BMI > 40 or > 35 with obesity - related health condition) with health conditions that include hypertension, dyslipidemias, and GERD . Weight is unchanged. BMI  is above average; BMI management plan is completed. We discussed portion control and increasing exercise.       12. Idiopathic acute pancreatitis, unspecified complication status    13. Vitamin D deficiency  -     Vitamin D,25-Hydroxy; Future    14. Arnold-Chiari malformation  -     MRI Cervical Spine Without Contrast; Future      Assessment & Plan  1. Annual wellness examination.  The patient has experienced a shift in her weight from class 1 to class 2. The patient is advised to monitor her portion sizes and increase her physical activity. A mammogram will be scheduled at [facility].    2. Brain mass.  An MRI of the brain with and without contrast will be ordered. Additionally, an MRI of the cervical spine will be ordered.    3. Depression.  The patient's mood is stable.    4. Idiopathic acute pancreatitis.  The patient has not experienced any seizures.    5. Vitamin D deficiency.  A vitamin D level test will be ordered.    Follow-up  The patient is scheduled for a follow-up visit in 6 months.    Patient Instructions     Health Maintenance Due   Topic Date Due    MAMMOGRAM  Never done    BMI FOLLOWUP  05/06/2023    COVID-19 Vaccine (4 - 2023-24 season) 09/01/2023    ANNUAL PHYSICAL  12/16/2023           Patient or patient representative verbalized consent for the use of Ambient Listening during the visit with  Anita Blanco MD for chart documentation. 5/17/2024  18:31 EDT

## 2024-05-20 ENCOUNTER — OFFICE (OUTPATIENT)
Dept: URBAN - METROPOLITAN AREA CLINIC 64 | Facility: CLINIC | Age: 49
End: 2024-05-20
Payer: COMMERCIAL

## 2024-05-20 VITALS
WEIGHT: 211 LBS | HEART RATE: 83 BPM | SYSTOLIC BLOOD PRESSURE: 131 MMHG | SYSTOLIC BLOOD PRESSURE: 135 MMHG | DIASTOLIC BLOOD PRESSURE: 96 MMHG | DIASTOLIC BLOOD PRESSURE: 90 MMHG | HEIGHT: 64 IN

## 2024-05-20 DIAGNOSIS — K86.1 OTHER CHRONIC PANCREATITIS: ICD-10-CM

## 2024-05-20 DIAGNOSIS — F17.290 NICOTINE DEPENDENCE, OTHER TOBACCO PRODUCT, UNCOMPLICATED: ICD-10-CM

## 2024-05-20 DIAGNOSIS — R10.13 EPIGASTRIC PAIN: ICD-10-CM

## 2024-05-20 DIAGNOSIS — K52.9 NONINFECTIVE GASTROENTERITIS AND COLITIS, UNSPECIFIED: ICD-10-CM

## 2024-05-20 PROCEDURE — 99214 OFFICE O/P EST MOD 30 MIN: CPT

## 2024-05-20 RX ORDER — NORTRIPTYLINE HYDROCHLORIDE 25 MG/1
25 CAPSULE ORAL
Qty: 30 | Refills: 11 | Status: ACTIVE
Start: 2024-05-20

## 2024-06-11 ENCOUNTER — TELEPHONE (OUTPATIENT)
Dept: FAMILY MEDICINE CLINIC | Facility: CLINIC | Age: 49
End: 2024-06-11
Payer: COMMERCIAL

## 2024-06-11 NOTE — TELEPHONE ENCOUNTER
"Relay     \"Glucose was 141 so if this was a fasting lab then we need to have you consider starting some medication to lower your blood sugar attend classes for diabetes and consider checking your blood sugars every morning.  Total cholesterol is 233 and goal is below 200 bad cholesterol is 128 and goal is below 70 if you are a diabetic if you are on a reliable form of birth control we should consider starting a statin and you should decrease your saturated fats and increase your walking.  Both vitamin D and B12 are low so increase to 1000 units of each daily over-the-counter.  Please let us know about the above and if you have any other questions or concerns\"                "

## 2024-06-11 NOTE — TELEPHONE ENCOUNTER
Patient advised of lab results. Verbalized understanding. No questions or concerns at this time.   She states she was fasting for these labs. She stated 3 hours later she went to [facility] and had labs and her glucose was 105.

## 2024-07-01 ENCOUNTER — OFFICE (OUTPATIENT)
Dept: URBAN - METROPOLITAN AREA CLINIC 64 | Facility: CLINIC | Age: 49
End: 2024-07-01
Payer: COMMERCIAL

## 2024-07-01 VITALS
HEART RATE: 96 BPM | SYSTOLIC BLOOD PRESSURE: 128 MMHG | DIASTOLIC BLOOD PRESSURE: 86 MMHG | WEIGHT: 209 LBS | HEIGHT: 64 IN

## 2024-07-01 DIAGNOSIS — R10.9 UNSPECIFIED ABDOMINAL PAIN: ICD-10-CM

## 2024-07-01 DIAGNOSIS — F17.290 NICOTINE DEPENDENCE, OTHER TOBACCO PRODUCT, UNCOMPLICATED: ICD-10-CM

## 2024-07-01 DIAGNOSIS — K59.00 CONSTIPATION, UNSPECIFIED: ICD-10-CM

## 2024-07-01 DIAGNOSIS — K86.1 OTHER CHRONIC PANCREATITIS: ICD-10-CM

## 2024-07-01 PROCEDURE — 99214 OFFICE O/P EST MOD 30 MIN: CPT | Performed by: INTERNAL MEDICINE

## 2024-07-01 RX ORDER — COLESEVELAM HYDROCHLORIDE 625 MG/1
3750 TABLET, COATED ORAL
Qty: 180 | Refills: 6 | Status: COMPLETED
Start: 2023-12-22 | End: 2024-07-01

## 2024-07-01 RX ORDER — NORTRIPTYLINE HYDROCHLORIDE 25 MG/1
25 CAPSULE ORAL
Qty: 30 | Refills: 11 | Status: ACTIVE
Start: 2024-05-20

## 2024-08-01 ENCOUNTER — HOSPITAL ENCOUNTER (EMERGENCY)
Facility: HOSPITAL | Age: 49
Discharge: HOME | End: 2024-08-02
Attending: EMERGENCY MEDICINE
Payer: COMMERCIAL

## 2024-08-01 DIAGNOSIS — R10.13 EPIGASTRIC PAIN: Primary | ICD-10-CM

## 2024-08-01 LAB
ALBUMIN SERPL-MCNC: 4.4 G/DL (ref 3.5–5.2)
ALBUMIN/GLOB SERPL: 1.6 G/DL
ALP SERPL-CCNC: 109 U/L (ref 39–117)
ALT SERPL W P-5'-P-CCNC: 25 U/L (ref 1–33)
ANION GAP SERPL CALCULATED.3IONS-SCNC: 12.2 MMOL/L (ref 5–15)
AST SERPL-CCNC: 23 U/L (ref 1–32)
BACTERIA UR QL AUTO: NORMAL /HPF
BASOPHILS # BLD AUTO: 0.03 10*3/MM3 (ref 0–0.2)
BASOPHILS NFR BLD AUTO: 0.3 % (ref 0–1.5)
BILIRUB SERPL-MCNC: 0.4 MG/DL (ref 0–1.2)
BILIRUB UR QL STRIP: NEGATIVE
BUN SERPL-MCNC: 8 MG/DL (ref 6–20)
BUN/CREAT SERPL: 9.4 (ref 7–25)
CALCIUM SPEC-SCNC: 9.4 MG/DL (ref 8.6–10.5)
CHLORIDE SERPL-SCNC: 104 MMOL/L (ref 98–107)
CLARITY UR: CLEAR
CO2 SERPL-SCNC: 24.8 MMOL/L (ref 22–29)
COLOR UR: YELLOW
CREAT SERPL-MCNC: 0.85 MG/DL (ref 0.57–1)
DEPRECATED RDW RBC AUTO: 46 FL (ref 37–54)
EGFRCR SERPLBLD CKD-EPI 2021: 85.7 ML/MIN/1.73
EOSINOPHIL # BLD AUTO: 0.1 10*3/MM3 (ref 0–0.4)
EOSINOPHIL NFR BLD AUTO: 1.1 % (ref 0.3–6.2)
ERYTHROCYTE [DISTWIDTH] IN BLOOD BY AUTOMATED COUNT: 13.6 % (ref 12.3–15.4)
GLOBULIN UR ELPH-MCNC: 2.7 GM/DL
GLUCOSE SERPL-MCNC: 81 MG/DL (ref 65–99)
GLUCOSE UR STRIP-MCNC: NEGATIVE MG/DL
HCT VFR BLD AUTO: 42.8 % (ref 34–46.6)
HGB BLD-MCNC: 13.7 G/DL (ref 12–15.9)
HGB UR QL STRIP.AUTO: NEGATIVE
HOLD SPECIMEN: NORMAL
HYALINE CASTS UR QL AUTO: NORMAL /LPF
IMM GRANULOCYTES # BLD AUTO: 0.02 10*3/MM3 (ref 0–0.05)
IMM GRANULOCYTES NFR BLD AUTO: 0.2 % (ref 0–0.5)
KETONES UR QL STRIP: ABNORMAL
LEUKOCYTE ESTERASE UR QL STRIP.AUTO: ABNORMAL
LIPASE SERPL-CCNC: 27 U/L (ref 13–60)
LYMPHOCYTES # BLD AUTO: 3.53 10*3/MM3 (ref 0.7–3.1)
LYMPHOCYTES NFR BLD AUTO: 38.9 % (ref 19.6–45.3)
MCH RBC QN AUTO: 29.2 PG (ref 26.6–33)
MCHC RBC AUTO-ENTMCNC: 32 G/DL (ref 31.5–35.7)
MCV RBC AUTO: 91.3 FL (ref 79–97)
MONOCYTES # BLD AUTO: 0.52 10*3/MM3 (ref 0.1–0.9)
MONOCYTES NFR BLD AUTO: 5.7 % (ref 5–12)
NEUTROPHILS NFR BLD AUTO: 4.87 10*3/MM3 (ref 1.7–7)
NEUTROPHILS NFR BLD AUTO: 53.8 % (ref 42.7–76)
NITRITE UR QL STRIP: NEGATIVE
NRBC BLD AUTO-RTO: 0 /100 WBC (ref 0–0.2)
PH UR STRIP.AUTO: 6 [PH] (ref 5–8)
PLATELET # BLD AUTO: 203 10*3/MM3 (ref 140–450)
PMV BLD AUTO: 11.3 FL (ref 6–12)
POTASSIUM SERPL-SCNC: 3.9 MMOL/L (ref 3.5–5.2)
PROT SERPL-MCNC: 7.1 G/DL (ref 6–8.5)
PROT UR QL STRIP: NEGATIVE
RBC # BLD AUTO: 4.69 10*6/MM3 (ref 3.77–5.28)
RBC # UR STRIP: NORMAL /HPF
RBC MORPH BLD: NORMAL
REF LAB TEST METHOD: NORMAL
SMALL PLATELETS BLD QL SMEAR: ADEQUATE
SODIUM SERPL-SCNC: 141 MMOL/L (ref 136–145)
SP GR UR STRIP: 1.02 (ref 1–1.03)
SQUAMOUS #/AREA URNS HPF: NORMAL /HPF
UROBILINOGEN UR QL STRIP: ABNORMAL
WBC # UR STRIP: NORMAL /HPF
WBC MORPH BLD: NORMAL
WBC NRBC COR # BLD AUTO: 9.07 10*3/MM3 (ref 3.4–10.8)
WHOLE BLOOD HOLD COAG: NORMAL
WHOLE BLOOD HOLD SPECIMEN: NORMAL

## 2024-08-01 PROCEDURE — 96374 THER/PROPH/DIAG INJ IV PUSH: CPT

## 2024-08-01 PROCEDURE — 85025 COMPLETE CBC W/AUTO DIFF WBC: CPT | Performed by: EMERGENCY MEDICINE

## 2024-08-01 PROCEDURE — 96375 TX/PRO/DX INJ NEW DRUG ADDON: CPT

## 2024-08-01 PROCEDURE — 25010000002 ONDANSETRON PER 1 MG: Performed by: NURSE PRACTITIONER

## 2024-08-01 PROCEDURE — 25010000002 HYDROMORPHONE 1 MG/ML SOLUTION: Performed by: NURSE PRACTITIONER

## 2024-08-01 PROCEDURE — 25010000002 ONDANSETRON PER 1 MG: Performed by: EMERGENCY MEDICINE

## 2024-08-01 PROCEDURE — 83690 ASSAY OF LIPASE: CPT | Performed by: EMERGENCY MEDICINE

## 2024-08-01 PROCEDURE — 36415 COLL VENOUS BLD VENIPUNCTURE: CPT

## 2024-08-01 PROCEDURE — 25010000002 HYDROMORPHONE 1 MG/ML SOLUTION: Performed by: EMERGENCY MEDICINE

## 2024-08-01 PROCEDURE — 81001 URINALYSIS AUTO W/SCOPE: CPT | Performed by: EMERGENCY MEDICINE

## 2024-08-01 PROCEDURE — 99283 EMERGENCY DEPT VISIT LOW MDM: CPT

## 2024-08-01 PROCEDURE — 96376 TX/PRO/DX INJ SAME DRUG ADON: CPT

## 2024-08-01 PROCEDURE — 85007 BL SMEAR W/DIFF WBC COUNT: CPT | Performed by: EMERGENCY MEDICINE

## 2024-08-01 PROCEDURE — 80053 COMPREHEN METABOLIC PANEL: CPT | Performed by: EMERGENCY MEDICINE

## 2024-08-01 RX ORDER — SODIUM CHLORIDE 0.9 % (FLUSH) 0.9 %
10 SYRINGE (ML) INJECTION AS NEEDED
Status: DISCONTINUED | OUTPATIENT
Start: 2024-08-01 | End: 2024-08-02 | Stop reason: HOSPADM

## 2024-08-01 RX ORDER — ONDANSETRON 2 MG/ML
4 INJECTION INTRAMUSCULAR; INTRAVENOUS ONCE
Status: COMPLETED | OUTPATIENT
Start: 2024-08-01 | End: 2024-08-01

## 2024-08-01 RX ORDER — HYDROCODONE BITARTRATE AND ACETAMINOPHEN 5; 325 MG/1; MG/1
1 TABLET ORAL EVERY 6 HOURS PRN
Qty: 12 TABLET | Refills: 0 | Status: SHIPPED | OUTPATIENT
Start: 2024-08-01

## 2024-08-01 RX ORDER — DOCUSATE SODIUM 100 MG/1
100 CAPSULE, LIQUID FILLED ORAL 2 TIMES DAILY
Qty: 30 CAPSULE | Refills: 0 | Status: SHIPPED | OUTPATIENT
Start: 2024-08-01

## 2024-08-01 RX ADMIN — HYDROMORPHONE HYDROCHLORIDE 0.5 MG: 1 INJECTION, SOLUTION INTRAMUSCULAR; INTRAVENOUS; SUBCUTANEOUS at 21:19

## 2024-08-01 RX ADMIN — HYDROMORPHONE HYDROCHLORIDE 0.5 MG: 1 INJECTION, SOLUTION INTRAMUSCULAR; INTRAVENOUS; SUBCUTANEOUS at 22:55

## 2024-08-01 RX ADMIN — ONDANSETRON 4 MG: 2 INJECTION INTRAMUSCULAR; INTRAVENOUS at 21:18

## 2024-08-01 RX ADMIN — ONDANSETRON 4 MG: 2 INJECTION INTRAMUSCULAR; INTRAVENOUS at 22:55

## 2024-08-01 NOTE — Clinical Note
[facility] EMERGENCY DEPARTMENT  [address] [place] [address]  Phone: [phone]    Dianna Claros was seen and treated in our emergency department on 8/1/2024.  She may return to work on 08/05/2024.         Thank you for choosing [facility].    Asha Laura, TINON

## 2024-08-01 NOTE — Clinical Note
[facility] EMERGENCY DEPARTMENT  [address] [place] [address]  Phone: [phone]    Dianna Claros was seen and treated in our emergency department on 8/1/2024.  She may return to work on 08/05/2024.         Thank you for choosing [facility].    Asha Laura, TINON

## 2024-08-01 NOTE — Clinical Note
[facility] EMERGENCY DEPARTMENT  [address] [facility] [place] [address]  Phone: [phone]    Dianna Claros was seen and treated in our emergency department on 8/1/2024.  She may return to work on 08/05/2024.         Thank you for choosing [facility].    Asha Laura, TINON

## 2024-08-02 VITALS
OXYGEN SATURATION: 93 % | HEIGHT: 64 IN | WEIGHT: 204.81 LBS | BODY MASS INDEX: 34.97 KG/M2 | HEART RATE: 73 BPM | TEMPERATURE: 98 F | RESPIRATION RATE: 16 BRPM | SYSTOLIC BLOOD PRESSURE: 92 MMHG | DIASTOLIC BLOOD PRESSURE: 54 MMHG

## 2024-08-02 NOTE — ED PROVIDER NOTES
Subjective   History of Present Illness  Patient is a 48 old female complaint 24 history of epigastric pain she also planes of nausea.  Denies cough fever chest pain vomiting diarrhea dysuria or other complaint      Review of Systems    Past Medical History:   Diagnosis Date    Endometriosis     Fibromyalgia     Hyperlipidemia     Hypertension     Injury of back        Allergies   Allergen Reactions    Roxicodone [Oxycodone Hcl] Itching and Nausea Only    Toradol [Ketorolac Tromethamine] Itching and Irritability    Gabapentin Nausea And Vomiting    Naproxen Nausea And Vomiting    Pregabalin Hives    Morphine Itching       Past Surgical History:   Procedure Laterality Date    ABDOMINAL SURGERY       SECTION      CHOLECYSTECTOMY WITH INTRAOPERATIVE CHOLANGIOGRAM N/A 2022    Procedure: CHOLECYSTECTOMY LAPAROSCOPIC INTRAOPERATIVE CHOLANGIOGRAM;  Surgeon: Robert Dale MD;  Location: [facility] MAIN OR;  Service: General;  Laterality: N/A;    COLONOSCOPY N/A 3/7/2023    Procedure: COLONOSCOPY with ileum and large intestine random biopsies R/O microscopic colitis;  Surgeon: NAVIN Schneider MD;  Location: [facility] ENDOSCOPY;  Service: Gastroenterology;  Laterality: N/A;  hemorrhoids    ENDOSCOPY N/A 2022    Procedure: ESOPHAGOGASTRODUODENOSCOPY WITH BIOPSY;  Surgeon: NAIVN Schneider MD;  Location: [facility] ENDOSCOPY;  Service: Gastroenterology;  Laterality: N/A;    HYSTERECTOMY      UPPER ENDOSCOPIC ULTRASOUND W/ FNA N/A 2023    Procedure: ESOPHAGOGASTRODUODENOSCOPY WITH ENDOSCOPIC ULTRASOUND;  Surgeon: Malik King MD;  Location: [facility] ENDOSCOPY;  Service: Gastroenterology;  Laterality: N/A;  POST: CHRONIC PANCREATITIS       Family History   Problem Relation Age of Onset    Cancer Mother     Heart disease Father        Social History     Socioeconomic History    Marital status:    Tobacco Use    Smoking status: Every Day     Current packs/day: 1.00     Average packs/day: 1 pack/day for 34.2  years (34.2 ttl pk-yrs)     Types: Cigarettes     Start date: 6/5/1990    Smokeless tobacco: Never   Vaping Use    Vaping status: Never Used   Substance and Sexual Activity    Alcohol use: Never    Drug use: Never    Sexual activity: Defer           Objective   Physical Exam  Neck has no adenopathy JVD or bruits.  Lungs clear.  Heart has regular rhythm.  Chest nontender.  Ab soft with mild epigastric tenderness.  Patient normal bowel sounds without rebound or guarding.  Back no CVA tenderness Permenter exam unremarkable.  Procedures           ED Course  ED Course as of 08/04/24 1440   Thu Aug 01, 2024   2326 Patient feels better.  She is tolerating oral fluids.    Inspect reviewed    Patient INSPECT report queried and reviewed.    I discussed with the patient the risk and benefits associated with opiate/narcotic pain medication today.  Based on patient's exam findings and assessment, I do feel it appropriate to prescribe a short course of opiate/ narcotic pain medication from the emergency department.  The patient was advised of the risks associated with such medication, including risk of dependency.  Patient was advised of medication administration instructions, appropriate use, potential side effects and adverse reactions.  Acknowledged and verbalized understanding, and agrees to treatment.    I discussed my findings, plan of care, discharge instructions, the importance of follow up with their PCP/ and or specialist for repeat evaluation and to discuss any abnormal findings in labs or imaging that warrant further outpatient evaluation. We discussed that although a definitive diagnosis is not always found in the ED, it is believed emergent conditions have been ruled out, and patient is safe for discharge at this time.  We discussed return precautions for the emergency department.  Patient verbalizes understandings, and agrees with current plan of care. [LB]      ED Course User Index  [LB] Stephanie Jose,  APRN      Results for orders placed or performed during the hospital encounter of 08/01/24   Comprehensive Metabolic Panel    Specimen: Arm, Right; Blood   Result Value Ref Range    Glucose 81 65 - 99 mg/dL    BUN 8 6 - 20 mg/dL    Creatinine 0.85 0.57 - 1.00 mg/dL    Sodium 141 136 - 145 mmol/L    Potassium 3.9 3.5 - 5.2 mmol/L    Chloride 104 98 - 107 mmol/L    CO2 24.8 22.0 - 29.0 mmol/L    Calcium 9.4 8.6 - 10.5 mg/dL    Total Protein 7.1 6.0 - 8.5 g/dL    Albumin 4.4 3.5 - 5.2 g/dL    ALT (SGPT) 25 1 - 33 U/L    AST (SGOT) 23 1 - 32 U/L    Alkaline Phosphatase 109 39 - 117 U/L    Total Bilirubin 0.4 0.0 - 1.2 mg/dL    Globulin 2.7 gm/dL    A/G Ratio 1.6 g/dL    BUN/Creatinine Ratio 9.4 7.0 - 25.0    Anion Gap 12.2 5.0 - 15.0 mmol/L    eGFR 85.7 >60.0 mL/min/1.73   Lipase    Specimen: Arm, Right; Blood   Result Value Ref Range    Lipase 27 13 - 60 U/L   CBC Auto Differential    Specimen: Blood   Result Value Ref Range    WBC 9.07 3.40 - 10.80 10*3/mm3    RBC 4.69 3.77 - 5.28 10*6/mm3    Hemoglobin 13.7 12.0 - 15.9 g/dL    Hematocrit 42.8 34.0 - 46.6 %    MCV 91.3 79.0 - 97.0 fL    MCH 29.2 26.6 - 33.0 pg    MCHC 32.0 31.5 - 35.7 g/dL    RDW 13.6 12.3 - 15.4 %    RDW-SD 46.0 37.0 - 54.0 fl    MPV 11.3 6.0 - 12.0 fL    Platelets 203 140 - 450 10*3/mm3    Neutrophil % 53.8 42.7 - 76.0 %    Lymphocyte % 38.9 19.6 - 45.3 %    Monocyte % 5.7 5.0 - 12.0 %    Eosinophil % 1.1 0.3 - 6.2 %    Basophil % 0.3 0.0 - 1.5 %    Immature Grans % 0.2 0.0 - 0.5 %    Neutrophils, Absolute 4.87 1.70 - 7.00 10*3/mm3    Lymphocytes, Absolute 3.53 (H) 0.70 - 3.10 10*3/mm3    Monocytes, Absolute 0.52 0.10 - 0.90 10*3/mm3    Eosinophils, Absolute 0.10 0.00 - 0.40 10*3/mm3    Basophils, Absolute 0.03 0.00 - 0.20 10*3/mm3    Immature Grans, Absolute 0.02 0.00 - 0.05 10*3/mm3    nRBC 0.0 0.0 - 0.2 /100 WBC   Scan Slide    Specimen: Blood   Result Value Ref Range    RBC Morphology Normal Normal    WBC Morphology Normal Normal    Platelet  Estimate Adequate Normal   Urinalysis With Culture If Indicated - Urine, Clean Catch    Specimen: Urine, Clean Catch   Result Value Ref Range    Color, UA Yellow Yellow, Straw    Appearance, UA Clear Clear    pH, UA 6.0 5.0 - 8.0    Specific Gravity, UA 1.016 1.005 - 1.030    Glucose, UA Negative Negative    Ketones, UA Trace (A) Negative    Bilirubin, UA Negative Negative    Blood, UA Negative Negative    Protein, UA Negative Negative    Leuk Esterase, UA Trace (A) Negative    Nitrite, UA Negative Negative    Urobilinogen, UA 1.0 E.U./dL 0.2 - 1.0 E.U./dL   Urinalysis, Microscopic Only - Urine, Clean Catch    Specimen: Urine, Clean Catch   Result Value Ref Range    RBC, UA 0-2 None Seen, 0-2 /HPF    WBC, UA 0-2 None Seen, 0-2 /HPF    Bacteria, UA None Seen None Seen /HPF    Squamous Epithelial Cells, UA 0-2 None Seen, 0-2 /HPF    Hyaline Casts, UA None Seen None Seen /LPF    Methodology Automated Microscopy    Lavender Top   Result Value Ref Range    Extra Tube hold for add-on    Gold Top - SST   Result Value Ref Range    Extra Tube Hold for add-ons.    Light Blue Top   Result Value Ref Range    Extra Tube Hold for add-ons.                                               Medical Decision Making  CBC shows no leukocytosis no left shift and no anemia.  Metabolic panel CMP and lipase are pending.  Further care turned over to Stephanie Macdonald NP.    CBC shows no leukocytosis no left shift and no anemia.  CMP shows no evidence of hepatitis.  Patient be discharged.  Will place on Colace.  She will follow with MD for recheck.    Problems Addressed:  Epigastric pain: complicated acute illness or injury    Amount and/or Complexity of Data Reviewed  Labs: ordered.    Risk  OTC drugs.  Prescription drug management.        Final diagnoses:   Epigastric pain       ED Disposition  ED Disposition       ED Disposition   Discharge    Condition   Stable    Comment   --               Anita Blanco MD  [address] [facility] [place]  [address]  [phone]          [facility] EMERGENCY DEPARTMENT  [address] [facility] [address] [phone]             Medication List        New Prescriptions      docusate sodium 100 MG capsule  Commonly known as: COLACE  Take 1 capsule by mouth 2 (Two) Times a Day.     HYDROcodone-acetaminophen 5-325 MG per tablet  Commonly known as: NORCO  Take 1 tablet by mouth Every 6 (Six) Hours As Needed for Moderate Pain.               Where to Get Your Medications        These medications were sent to [facility] - [place], [place] - [address] - [phone]  - [phone]   [address]  [place] [address]      Phone: [phone]   docusate sodium 100 MG capsule  HYDROcodone-acetaminophen 5-325 MG per tablet            Zhao Delatorre MD  08/04/24 4641

## 2024-09-02 ENCOUNTER — HOSPITAL ENCOUNTER (EMERGENCY)
Facility: HOSPITAL | Age: 49
Discharge: HOME | End: 2024-09-03
Attending: EMERGENCY MEDICINE
Payer: COMMERCIAL

## 2024-09-02 DIAGNOSIS — R10.9 ACUTE ABDOMINAL PAIN: Primary | ICD-10-CM

## 2024-09-02 LAB
ALBUMIN SERPL-MCNC: 4.5 G/DL (ref 3.5–5.2)
ALBUMIN/GLOB SERPL: 1.7 G/DL
ALP SERPL-CCNC: 105 U/L (ref 39–117)
ALT SERPL W P-5'-P-CCNC: 18 U/L (ref 1–33)
ANION GAP SERPL CALCULATED.3IONS-SCNC: 11.8 MMOL/L (ref 5–15)
AST SERPL-CCNC: 20 U/L (ref 1–32)
BASOPHILS # BLD AUTO: 0.02 10*3/MM3 (ref 0–0.2)
BASOPHILS NFR BLD AUTO: 0.2 % (ref 0–1.5)
BILIRUB SERPL-MCNC: 0.2 MG/DL (ref 0–1.2)
BILIRUB UR QL STRIP: NEGATIVE
BUN SERPL-MCNC: 11 MG/DL (ref 6–20)
BUN/CREAT SERPL: 13.6 (ref 7–25)
CALCIUM SPEC-SCNC: 9.6 MG/DL (ref 8.6–10.5)
CHLORIDE SERPL-SCNC: 105 MMOL/L (ref 98–107)
CLARITY UR: CLEAR
CO2 SERPL-SCNC: 24.2 MMOL/L (ref 22–29)
COLOR UR: YELLOW
CREAT SERPL-MCNC: 0.81 MG/DL (ref 0.57–1)
DEPRECATED RDW RBC AUTO: 44.6 FL (ref 37–54)
EGFRCR SERPLBLD CKD-EPI 2021: 90.8 ML/MIN/1.73
EOSINOPHIL # BLD AUTO: 0.11 10*3/MM3 (ref 0–0.4)
EOSINOPHIL NFR BLD AUTO: 1.2 % (ref 0.3–6.2)
ERYTHROCYTE [DISTWIDTH] IN BLOOD BY AUTOMATED COUNT: 13.4 % (ref 12.3–15.4)
GLOBULIN UR ELPH-MCNC: 2.7 GM/DL
GLUCOSE SERPL-MCNC: 118 MG/DL (ref 65–99)
GLUCOSE UR STRIP-MCNC: NEGATIVE MG/DL
HCT VFR BLD AUTO: 41.8 % (ref 34–46.6)
HGB BLD-MCNC: 13.6 G/DL (ref 12–15.9)
HGB UR QL STRIP.AUTO: NEGATIVE
HOLD SPECIMEN: NORMAL
IMM GRANULOCYTES # BLD AUTO: 0.03 10*3/MM3 (ref 0–0.05)
IMM GRANULOCYTES NFR BLD AUTO: 0.3 % (ref 0–0.5)
KETONES UR QL STRIP: NEGATIVE
LEUKOCYTE ESTERASE UR QL STRIP.AUTO: NEGATIVE
LIPASE SERPL-CCNC: 53 U/L (ref 13–60)
LYMPHOCYTES # BLD AUTO: 3.73 10*3/MM3 (ref 0.7–3.1)
LYMPHOCYTES NFR BLD AUTO: 41.7 % (ref 19.6–45.3)
MCH RBC QN AUTO: 29.5 PG (ref 26.6–33)
MCHC RBC AUTO-ENTMCNC: 32.5 G/DL (ref 31.5–35.7)
MCV RBC AUTO: 90.7 FL (ref 79–97)
MONOCYTES # BLD AUTO: 0.52 10*3/MM3 (ref 0.1–0.9)
MONOCYTES NFR BLD AUTO: 5.8 % (ref 5–12)
NEUTROPHILS NFR BLD AUTO: 4.53 10*3/MM3 (ref 1.7–7)
NEUTROPHILS NFR BLD AUTO: 50.8 % (ref 42.7–76)
NITRITE UR QL STRIP: NEGATIVE
NRBC BLD AUTO-RTO: 0 /100 WBC (ref 0–0.2)
PH UR STRIP.AUTO: 6 [PH] (ref 5–8)
PLATELET # BLD AUTO: 235 10*3/MM3 (ref 140–450)
PMV BLD AUTO: 11.4 FL (ref 6–12)
POTASSIUM SERPL-SCNC: 3.8 MMOL/L (ref 3.5–5.2)
PROT SERPL-MCNC: 7.2 G/DL (ref 6–8.5)
PROT UR QL STRIP: NEGATIVE
RBC # BLD AUTO: 4.61 10*6/MM3 (ref 3.77–5.28)
SODIUM SERPL-SCNC: 141 MMOL/L (ref 136–145)
SP GR UR STRIP: 1.01 (ref 1–1.03)
UROBILINOGEN UR QL STRIP: NORMAL
WBC NRBC COR # BLD AUTO: 8.94 10*3/MM3 (ref 3.4–10.8)
WHOLE BLOOD HOLD COAG: NORMAL

## 2024-09-02 PROCEDURE — 99283 EMERGENCY DEPT VISIT LOW MDM: CPT

## 2024-09-02 PROCEDURE — 85025 COMPLETE CBC W/AUTO DIFF WBC: CPT | Performed by: EMERGENCY MEDICINE

## 2024-09-02 PROCEDURE — 83690 ASSAY OF LIPASE: CPT | Performed by: EMERGENCY MEDICINE

## 2024-09-02 PROCEDURE — 96374 THER/PROPH/DIAG INJ IV PUSH: CPT

## 2024-09-02 PROCEDURE — 80053 COMPREHEN METABOLIC PANEL: CPT | Performed by: EMERGENCY MEDICINE

## 2024-09-02 PROCEDURE — 25010000002 ONDANSETRON PER 1 MG: Performed by: EMERGENCY MEDICINE

## 2024-09-02 PROCEDURE — 81003 URINALYSIS AUTO W/O SCOPE: CPT | Performed by: EMERGENCY MEDICINE

## 2024-09-02 PROCEDURE — 96375 TX/PRO/DX INJ NEW DRUG ADDON: CPT

## 2024-09-02 PROCEDURE — 25010000002 HYDROMORPHONE 1 MG/ML SOLUTION: Performed by: EMERGENCY MEDICINE

## 2024-09-02 RX ORDER — ONDANSETRON 4 MG/1
4 TABLET, FILM COATED ORAL EVERY 8 HOURS PRN
Qty: 20 TABLET | Refills: 0 | Status: SHIPPED | OUTPATIENT
Start: 2024-09-02

## 2024-09-02 RX ORDER — FAMOTIDINE 10 MG/ML
20 INJECTION, SOLUTION INTRAVENOUS ONCE
Status: COMPLETED | OUTPATIENT
Start: 2024-09-02 | End: 2024-09-02

## 2024-09-02 RX ORDER — ONDANSETRON 2 MG/ML
4 INJECTION INTRAMUSCULAR; INTRAVENOUS ONCE
Status: COMPLETED | OUTPATIENT
Start: 2024-09-02 | End: 2024-09-02

## 2024-09-02 RX ORDER — HYDROCODONE BITARTRATE AND ACETAMINOPHEN 5; 325 MG/1; MG/1
1 TABLET ORAL EVERY 4 HOURS PRN
Qty: 15 TABLET | Refills: 0 | Status: SHIPPED | OUTPATIENT
Start: 2024-09-02

## 2024-09-02 RX ADMIN — HYDROMORPHONE HYDROCHLORIDE 0.5 MG: 1 INJECTION, SOLUTION INTRAMUSCULAR; INTRAVENOUS; SUBCUTANEOUS at 22:47

## 2024-09-02 RX ADMIN — ONDANSETRON 4 MG: 2 INJECTION INTRAMUSCULAR; INTRAVENOUS at 22:47

## 2024-09-02 RX ADMIN — FAMOTIDINE 20 MG: 10 INJECTION INTRAVENOUS at 22:47

## 2024-09-03 VITALS
HEIGHT: 65 IN | SYSTOLIC BLOOD PRESSURE: 115 MMHG | TEMPERATURE: 98.5 F | HEART RATE: 63 BPM | DIASTOLIC BLOOD PRESSURE: 64 MMHG | BODY MASS INDEX: 33.61 KG/M2 | RESPIRATION RATE: 17 BRPM | WEIGHT: 201.72 LBS | OXYGEN SATURATION: 97 %

## 2024-09-03 NOTE — ED PROVIDER NOTES
Subjective   History of Present Illness  48-year-old female with history of chronic pancreatitis with suspected flare over the past 48 hours presents for further evaluation and treatment.  Patient complains of moderate epigastric pain, nausea.  No blood in stool, no vomiting, chronic diarrhea, unchanged, no chest symptoms, no fever.      Review of Systems   Gastrointestinal:  Positive for abdominal pain and nausea.       Past Medical History:   Diagnosis Date    Endometriosis     Fibromyalgia     Hyperlipidemia     Hypertension     Injury of back        Allergies   Allergen Reactions    Roxicodone [Oxycodone Hcl] Itching and Nausea Only    Toradol [Ketorolac Tromethamine] Itching and Irritability    Gabapentin Nausea And Vomiting    Naproxen Nausea And Vomiting    Pregabalin Hives    Morphine Itching       Past Surgical History:   Procedure Laterality Date    ABDOMINAL SURGERY       SECTION      CHOLECYSTECTOMY WITH INTRAOPERATIVE CHOLANGIOGRAM N/A 2022    Procedure: CHOLECYSTECTOMY LAPAROSCOPIC INTRAOPERATIVE CHOLANGIOGRAM;  Surgeon: Robert Dale MD;  Location: [facility] MAIN OR;  Service: General;  Laterality: N/A;    COLONOSCOPY N/A 3/7/2023    Procedure: COLONOSCOPY with ileum and large intestine random biopsies R/O microscopic colitis;  Surgeon: NAVIN Schneider MD;  Location: [facility] ENDOSCOPY;  Service: Gastroenterology;  Laterality: N/A;  hemorrhoids    ENDOSCOPY N/A 2022    Procedure: ESOPHAGOGASTRODUODENOSCOPY WITH BIOPSY;  Surgeon: NAVIN Schneider MD;  Location: [facility] ENDOSCOPY;  Service: Gastroenterology;  Laterality: N/A;    HYSTERECTOMY      UPPER ENDOSCOPIC ULTRASOUND W/ FNA N/A 2023    Procedure: ESOPHAGOGASTRODUODENOSCOPY WITH ENDOSCOPIC ULTRASOUND;  Surgeon: Malik King MD;  Location: [facility] ENDOSCOPY;  Service: Gastroenterology;  Laterality: N/A;  POST: CHRONIC PANCREATITIS       Family History   Problem Relation Age of Onset    Cancer Mother     Heart disease Father         Social History     Socioeconomic History    Marital status:    Tobacco Use    Smoking status: Every Day     Current packs/day: 1.00     Average packs/day: 1 pack/day for 34.2 years (34.2 ttl pk-yrs)     Types: Cigarettes     Start date: 6/5/1990    Smokeless tobacco: Never   Vaping Use    Vaping status: Never Used   Substance and Sexual Activity    Alcohol use: Never    Drug use: Never    Sexual activity: Defer           Objective   Physical Exam  Constitutional:       Appearance: She is well-developed.   HENT:      Head: Normocephalic and atraumatic.   Cardiovascular:      Rate and Rhythm: Normal rate and regular rhythm.      Heart sounds: Normal heart sounds.   Pulmonary:      Effort: Pulmonary effort is normal.      Breath sounds: Normal breath sounds.   Abdominal:      General: Bowel sounds are normal.      Palpations: Abdomen is soft.      Comments: Epigastric tenderness, mild, no rebound or guarding, no lower abdominal tenderness   Skin:     General: Skin is warm and dry.      Capillary Refill: Capillary refill takes less than 2 seconds.   Neurological:      Mental Status: She is alert.   Psychiatric:         Mood and Affect: Mood normal.         Behavior: Behavior normal.         Procedures           ED Course                                             Medical Decision Making  Patient with chronic abdominal pain with no laboratory evidence of any significant acute on chronic pancreatitis, inspect reviewed, will prescribe a few days of pain medicine, patient will continue clear liquids, return precautions reviewed in detail, patient appears well and is comfortable with plan.    Problems Addressed:  Acute abdominal pain: complicated acute illness or injury    Amount and/or Complexity of Data Reviewed  Labs: ordered.    Risk  Prescription drug management.        Final diagnoses:   Acute abdominal pain       ED Disposition  ED Disposition       ED Disposition   Discharge    Condition   Stable     Comment   --               Anita Blanco MD  [address] [facility] [place] [address]  [phone]               Medication List        New Prescriptions      ondansetron 4 MG tablet  Commonly known as: ZOFRAN  Take 1 tablet by mouth Every 8 (Eight) Hours As Needed for Nausea or Vomiting.            Changed      * HYDROcodone-acetaminophen 5-325 MG per tablet  Commonly known as: NORCO  Take 1 tablet by mouth Every 6 (Six) Hours As Needed for Moderate Pain.  What changed: Another medication with the same name was added. Make sure you understand how and when to take each.     * HYDROcodone-acetaminophen 5-325 MG per tablet  Commonly known as: NORCO  Take 1 tablet by mouth Every 4 (Four) Hours As Needed for Moderate Pain.  What changed: You were already taking a medication with the same name, and this prescription was added. Make sure you understand how and when to take each.           * This list has 2 medication(s) that are the same as other medications prescribed for you. Read the directions carefully, and ask your doctor or other care provider to review them with you.                   Where to Get Your Medications        These medications were sent to [facility] - [place], [place] - [address]  - [phone] [facility] [phone]   [address] [place] [address]      Phone: [phone]   HYDROcodone-acetaminophen 5-325 MG per tablet  ondansetron 4 MG tablet            Nico Rivas MD  09/02/24 4763

## 2024-09-28 ENCOUNTER — HOSPITAL ENCOUNTER (EMERGENCY)
Facility: HOSPITAL | Age: 49
Discharge: HOME | End: 2024-09-29
Payer: COMMERCIAL

## 2024-09-28 ENCOUNTER — APPOINTMENT (OUTPATIENT)
Dept: CT IMAGING | Facility: HOSPITAL | Age: 49
End: 2024-09-28
Payer: COMMERCIAL

## 2024-09-28 ENCOUNTER — APPOINTMENT (OUTPATIENT)
Dept: GENERAL RADIOLOGY | Facility: HOSPITAL | Age: 49
End: 2024-09-28
Payer: COMMERCIAL

## 2024-09-28 VITALS
SYSTOLIC BLOOD PRESSURE: 105 MMHG | HEART RATE: 78 BPM | BODY MASS INDEX: 33.31 KG/M2 | RESPIRATION RATE: 16 BRPM | HEIGHT: 65 IN | OXYGEN SATURATION: 98 % | WEIGHT: 199.96 LBS | DIASTOLIC BLOOD PRESSURE: 66 MMHG | TEMPERATURE: 98 F

## 2024-09-28 DIAGNOSIS — R10.13 EPIGASTRIC PAIN: Primary | ICD-10-CM

## 2024-09-28 DIAGNOSIS — R07.9 CHEST PAIN, UNSPECIFIED TYPE: ICD-10-CM

## 2024-09-28 LAB
ALBUMIN SERPL-MCNC: 4.5 G/DL (ref 3.5–5.2)
ALBUMIN/GLOB SERPL: 1.4 G/DL
ALP SERPL-CCNC: 109 U/L (ref 39–117)
ALT SERPL W P-5'-P-CCNC: 17 U/L (ref 1–33)
ANION GAP SERPL CALCULATED.3IONS-SCNC: 12.1 MMOL/L (ref 5–15)
AST SERPL-CCNC: 19 U/L (ref 1–32)
BASOPHILS # BLD AUTO: 0.02 10*3/MM3 (ref 0–0.2)
BASOPHILS NFR BLD AUTO: 0.2 % (ref 0–1.5)
BILIRUB SERPL-MCNC: 0.2 MG/DL (ref 0–1.2)
BILIRUB UR QL STRIP: NEGATIVE
BUN SERPL-MCNC: 15 MG/DL (ref 6–20)
BUN/CREAT SERPL: 13.4 (ref 7–25)
CALCIUM SPEC-SCNC: 9.8 MG/DL (ref 8.6–10.5)
CHLORIDE SERPL-SCNC: 104 MMOL/L (ref 98–107)
CLARITY UR: CLEAR
CO2 SERPL-SCNC: 24.9 MMOL/L (ref 22–29)
COLOR UR: YELLOW
CREAT SERPL-MCNC: 1.12 MG/DL (ref 0.57–1)
DEPRECATED RDW RBC AUTO: 42.7 FL (ref 37–54)
EGFRCR SERPLBLD CKD-EPI 2021: 61.5 ML/MIN/1.73
EOSINOPHIL # BLD AUTO: 0.11 10*3/MM3 (ref 0–0.4)
EOSINOPHIL NFR BLD AUTO: 1.2 % (ref 0.3–6.2)
ERYTHROCYTE [DISTWIDTH] IN BLOOD BY AUTOMATED COUNT: 12.9 % (ref 12.3–15.4)
GLOBULIN UR ELPH-MCNC: 3.2 GM/DL
GLUCOSE SERPL-MCNC: 93 MG/DL (ref 65–99)
GLUCOSE UR STRIP-MCNC: NEGATIVE MG/DL
HCT VFR BLD AUTO: 43.9 % (ref 34–46.6)
HGB BLD-MCNC: 14.1 G/DL (ref 12–15.9)
HGB UR QL STRIP.AUTO: NEGATIVE
IMM GRANULOCYTES # BLD AUTO: 0.03 10*3/MM3 (ref 0–0.05)
IMM GRANULOCYTES NFR BLD AUTO: 0.3 % (ref 0–0.5)
KETONES UR QL STRIP: NEGATIVE
LEUKOCYTE ESTERASE UR QL STRIP.AUTO: NEGATIVE
LIPASE SERPL-CCNC: 58 U/L (ref 13–60)
LYMPHOCYTES # BLD AUTO: 3.8 10*3/MM3 (ref 0.7–3.1)
LYMPHOCYTES NFR BLD AUTO: 42.5 % (ref 19.6–45.3)
MCH RBC QN AUTO: 29.2 PG (ref 26.6–33)
MCHC RBC AUTO-ENTMCNC: 32.1 G/DL (ref 31.5–35.7)
MCV RBC AUTO: 90.9 FL (ref 79–97)
MONOCYTES # BLD AUTO: 0.46 10*3/MM3 (ref 0.1–0.9)
MONOCYTES NFR BLD AUTO: 5.1 % (ref 5–12)
NEUTROPHILS NFR BLD AUTO: 4.52 10*3/MM3 (ref 1.7–7)
NEUTROPHILS NFR BLD AUTO: 50.7 % (ref 42.7–76)
NITRITE UR QL STRIP: NEGATIVE
NRBC BLD AUTO-RTO: 0 /100 WBC (ref 0–0.2)
PH UR STRIP.AUTO: 6 [PH] (ref 5–8)
PLATELET # BLD AUTO: 243 10*3/MM3 (ref 140–450)
PMV BLD AUTO: 11.2 FL (ref 6–12)
POTASSIUM SERPL-SCNC: 4.2 MMOL/L (ref 3.5–5.2)
PROT SERPL-MCNC: 7.7 G/DL (ref 6–8.5)
PROT UR QL STRIP: NEGATIVE
RBC # BLD AUTO: 4.83 10*6/MM3 (ref 3.77–5.28)
SODIUM SERPL-SCNC: 141 MMOL/L (ref 136–145)
SP GR UR STRIP: 1.01 (ref 1–1.03)
TROPONIN T SERPL HS-MCNC: <6 NG/L
UROBILINOGEN UR QL STRIP: NORMAL
WBC NRBC COR # BLD AUTO: 8.94 10*3/MM3 (ref 3.4–10.8)

## 2024-09-28 PROCEDURE — 25010000002 HYDROMORPHONE 1 MG/ML SOLUTION: Performed by: NURSE PRACTITIONER

## 2024-09-28 PROCEDURE — 83690 ASSAY OF LIPASE: CPT | Performed by: NURSE PRACTITIONER

## 2024-09-28 PROCEDURE — 96374 THER/PROPH/DIAG INJ IV PUSH: CPT

## 2024-09-28 PROCEDURE — 71045 X-RAY EXAM CHEST 1 VIEW: CPT

## 2024-09-28 PROCEDURE — 36415 COLL VENOUS BLD VENIPUNCTURE: CPT

## 2024-09-28 PROCEDURE — 74177 CT ABD & PELVIS W/CONTRAST: CPT

## 2024-09-28 PROCEDURE — 96375 TX/PRO/DX INJ NEW DRUG ADDON: CPT

## 2024-09-28 PROCEDURE — 81003 URINALYSIS AUTO W/O SCOPE: CPT | Performed by: NURSE PRACTITIONER

## 2024-09-28 PROCEDURE — 25510000001 IOPAMIDOL PER 1 ML: Performed by: NURSE PRACTITIONER

## 2024-09-28 PROCEDURE — 93005 ELECTROCARDIOGRAM TRACING: CPT

## 2024-09-28 PROCEDURE — 80053 COMPREHEN METABOLIC PANEL: CPT | Performed by: NURSE PRACTITIONER

## 2024-09-28 PROCEDURE — 85025 COMPLETE CBC W/AUTO DIFF WBC: CPT | Performed by: NURSE PRACTITIONER

## 2024-09-28 PROCEDURE — 99285 EMERGENCY DEPT VISIT HI MDM: CPT

## 2024-09-28 PROCEDURE — 84484 ASSAY OF TROPONIN QUANT: CPT | Performed by: NURSE PRACTITIONER

## 2024-09-28 PROCEDURE — 25010000002 ONDANSETRON PER 1 MG: Performed by: NURSE PRACTITIONER

## 2024-09-28 RX ORDER — SODIUM CHLORIDE 0.9 % (FLUSH) 0.9 %
10 SYRINGE (ML) INJECTION AS NEEDED
Status: DISCONTINUED | OUTPATIENT
Start: 2024-09-28 | End: 2024-09-29 | Stop reason: HOSPADM

## 2024-09-28 RX ORDER — NORTRIPTYLINE HCL 10 MG
10 CAPSULE ORAL NIGHTLY
COMMUNITY

## 2024-09-28 RX ORDER — FAMOTIDINE 40 MG/1
40 TABLET, FILM COATED ORAL NIGHTLY
COMMUNITY

## 2024-09-28 RX ORDER — IOPAMIDOL 755 MG/ML
100 INJECTION, SOLUTION INTRAVASCULAR
Status: COMPLETED | OUTPATIENT
Start: 2024-09-28 | End: 2024-09-28

## 2024-09-28 RX ORDER — FAMOTIDINE 20 MG/1
20 TABLET, FILM COATED ORAL EVERY MORNING
COMMUNITY

## 2024-09-28 RX ORDER — ONDANSETRON 2 MG/ML
4 INJECTION INTRAMUSCULAR; INTRAVENOUS ONCE
Status: COMPLETED | OUTPATIENT
Start: 2024-09-28 | End: 2024-09-28

## 2024-09-28 RX ADMIN — HYDROMORPHONE HYDROCHLORIDE 0.5 MG: 1 INJECTION, SOLUTION INTRAMUSCULAR; INTRAVENOUS; SUBCUTANEOUS at 21:18

## 2024-09-28 RX ADMIN — ONDANSETRON 4 MG: 2 INJECTION, SOLUTION INTRAMUSCULAR; INTRAVENOUS at 21:17

## 2024-09-28 RX ADMIN — IOPAMIDOL 100 ML: 755 INJECTION, SOLUTION INTRAVENOUS at 21:51

## 2024-09-29 LAB
QT INTERVAL: 371 MS
QTC INTERVAL: 440 MS
TROPONIN T SERPL HS-MCNC: 6 NG/L

## 2024-09-29 NOTE — ED PROVIDER NOTES
Subjective   Chief Complaint   Patient presents with    Abdominal Pain       History of Present Illness  Patient is a 48-year-old female presents to the ED with complaint of upper, abdominal pain.  Patient reports a history of pancreatitis, she reports this is not secondary to alcohol use.    Reports the pain will go up into her left chest at times.  Denies any dyspnea.  No abnormal leg pain or swelling.  No dizziness, lightheadedness, diaphoresis or syncope.     She does report she started doing clear okay diet today, but has not improved her pain.  Denies any vomiting.  No diarrhea.  No fevers.  No urinary symptoms    No hematemesis, Carbagen emesis, hematochezia or melena.  No excessive NSAID use.  Review of Systems  Per HPI  Past Medical History:   Diagnosis Date    Endometriosis     Fibromyalgia     Hyperlipidemia     Hypertension     Injury of back        Allergies   Allergen Reactions    Roxicodone [Oxycodone Hcl] Itching and Nausea Only    Toradol [Ketorolac Tromethamine] Itching and Irritability    Gabapentin Nausea And Vomiting    Naproxen Nausea And Vomiting    Pregabalin Hives    Morphine Itching       Past Surgical History:   Procedure Laterality Date    ABDOMINAL SURGERY       SECTION      CHOLECYSTECTOMY WITH INTRAOPERATIVE CHOLANGIOGRAM N/A 2022    Procedure: CHOLECYSTECTOMY LAPAROSCOPIC INTRAOPERATIVE CHOLANGIOGRAM;  Surgeon: Robert Dale MD;  Location: [facility] MAIN OR;  Service: General;  Laterality: N/A;    COLONOSCOPY N/A 3/7/2023    Procedure: COLONOSCOPY with ileum and large intestine random biopsies R/O microscopic colitis;  Surgeon: NAVIN Schneider MD;  Location: [facility] ENDOSCOPY;  Service: Gastroenterology;  Laterality: N/A;  hemorrhoids    ENDOSCOPY N/A 2022    Procedure: ESOPHAGOGASTRODUODENOSCOPY WITH BIOPSY;  Surgeon: NAVIN Schneider MD;  Location: [facility] ENDOSCOPY;  Service: Gastroenterology;  Laterality: N/A;    HYSTERECTOMY      UPPER ENDOSCOPIC ULTRASOUND W/  FNA N/A 12/4/2023    Procedure: ESOPHAGOGASTRODUODENOSCOPY WITH ENDOSCOPIC ULTRASOUND;  Surgeon: Malik King MD;  Location: [facility] ENDOSCOPY;  Service: Gastroenterology;  Laterality: N/A;  POST: CHRONIC PANCREATITIS       Family History   Problem Relation Age of Onset    Cancer Mother     Heart disease Father        Social History     Socioeconomic History    Marital status:    Tobacco Use    Smoking status: Every Day     Current packs/day: 1.00     Average packs/day: 1 pack/day for 34.3 years (34.3 ttl pk-yrs)     Types: Cigarettes     Start date: 6/5/1990    Smokeless tobacco: Never   Vaping Use    Vaping status: Never Used   Substance and Sexual Activity    Alcohol use: Never    Drug use: Never    Sexual activity: Defer           Objective   Physical Exam  Vitals and nursing note reviewed.   Constitutional:       Appearance: She is well-developed. She is not toxic-appearing.   HENT:      Head: Normocephalic and atraumatic.      Mouth/Throat:      Mouth: Mucous membranes are moist.      Pharynx: Oropharynx is clear.   Eyes:      Extraocular Movements: Extraocular movements intact.      Pupils: Pupils are equal, round, and reactive to light.   Cardiovascular:      Rate and Rhythm: Normal rate and regular rhythm.      Heart sounds: No murmur heard.     No friction rub. No gallop.   Pulmonary:      Effort: Pulmonary effort is normal.      Breath sounds: Normal breath sounds.   Abdominal:      General: Abdomen is flat. Bowel sounds are normal.      Palpations: Abdomen is soft.      Tenderness: There is abdominal tenderness in the epigastric area. There is no guarding or rebound. Negative signs include Obregon's sign.   Skin:     General: Skin is warm and dry.      Capillary Refill: Capillary refill takes less than 2 seconds.   Neurological:      General: No focal deficit present.      Mental Status: She is alert and oriented to person, place, and time.         Procedures           ED Course  ED Course as of  09/29/24 0427   Sat Sep 28, 2024   2326 With the patient the bedside.  She would prefer to go home [LB]      ED Course User Index  [LB] Stephanie Jose APRN      Vitals:    09/28/24 2331   BP: 105/66   Pulse: 78   Resp:    Temp:    SpO2: 98%     Medications   HYDROmorphone (DILAUDID) injection 0.5 mg (0.5 mg Intravenous Given 9/28/24 2118)   ondansetron (ZOFRAN) injection 4 mg (4 mg Intravenous Given 9/28/24 2117)   iopamidol (ISOVUE-370) 76 % injection 100 mL (100 mL Intravenous Given 9/28/24 2151)     CT Abdomen Pelvis With Contrast    Result Date: 9/28/2024  Impression: 1.No acute abdominal or pelvic abnormality. 2.Status post cholecystectomy and hysterectomy. 3.Punctate calcifications in the pancreatic head, which could be from prior inflammation. Electronically Signed: Brady Lawrence MD  9/28/2024 11:09 PM EDT  Workstation ID: [id number]    XR Chest 1 View    Result Date: 9/28/2024  Impression: No acute cardiopulmonary abnormality. Electronically Signed: Brady Lawrence MD  9/28/2024 11:01 PM EDT  Workstation ID: [id number]             HEART Score: 2                              Medical Decision Making  Problems Addressed:  Chest pain, unspecified type: complicated acute illness or injury  Epigastric pain: complicated acute illness or injury    Amount and/or Complexity of Data Reviewed  Labs: ordered.  Radiology: ordered.    Risk  Prescription drug management.    Chart Review: Gastroenterology no for chronic pancreatitis 7/1/2024      EKG interpreted independently per ED attending physican-Dr. Whittaker  Sinus rhythm rate of 84.  Compared to previous 4/20/2024.  No ectopy.        Pt was Placed on appropriate monitoring.  Differential diagnoses considered for patient presentation, this list is not all inclusive of diagnoses considered: Pancreatitis, ACS, NSTEMI, pneumonia  Patient presents to the ED for the above complaint, underwent the above, exam and workup notable for, No leukocytosis.  CMP reviewed.  UA  negative.  Troponin negative x 2.  EKG without acute ST changes.  Spoke with patient bedside regarding her workup.  We discussed observation given her chest pain, and abdominal pain.  Patient reports she would like to be discharged home.  We utilized her decision-making, her heart score is low.  He is going to continue her clinic with diet.  Have given her information for outpatient cardiology follow-up.      Disposition:   Note Disclaimer: At [facility], we believe that sharing information builds trust and better relationships. You are receiving this note because you recently visited [facility]. It is possible you will see health information before a provider has talked with you about it. This kind of information can be easy to misunderstand. To help you fully understand what it means for your health, we urge you to discuss this note with your provider  Note dictated utilizing Dragon Dictation.  Appropriate PPE worn during patient interactions.        Final diagnoses:   Epigastric pain   Chest pain, unspecified type       ED Disposition  ED Disposition       ED Disposition   Discharge    Condition   Stable    Comment   --               [facility] EMERGENCY DEPARTMENT  [address] [facility] [address] [phone]        PATIENT CONNECTION - [facility] [address]  [phone]  Schedule an appointment as soon as possible for a visit   Call for assistance with follow up with Primary care provider-call tomorrow.    Anita Blanco MD  [address] [facility] [place] [address] [phone]          Berry Moore MD  [address] [facility] [place] [address] [phone]    Schedule an appointment as soon as possible for a visit            Medication List      No changes were made to your prescriptions during this visit.            Stephanie Jose, APRN  09/29/24 0428

## 2024-09-29 NOTE — DISCHARGE INSTRUCTIONS
Follow-up with primary care provider, if you not have a primary care provider please utilize patient connection as above to call and establish [facility]- [phone]  Return to the ED for new or worsening symptoms  Please follow-up with cardiology, call for an appointment

## 2024-10-24 ENCOUNTER — HOSPITAL ENCOUNTER (EMERGENCY)
Facility: HOSPITAL | Age: 49
Discharge: HOME | End: 2024-10-24
Attending: EMERGENCY MEDICINE | Admitting: EMERGENCY MEDICINE
Payer: COMMERCIAL

## 2024-10-24 ENCOUNTER — TELEPHONE (OUTPATIENT)
Dept: FAMILY MEDICINE CLINIC | Facility: CLINIC | Age: 49
End: 2024-10-24
Payer: COMMERCIAL

## 2024-10-24 VITALS
RESPIRATION RATE: 15 BRPM | BODY MASS INDEX: 34.4 KG/M2 | DIASTOLIC BLOOD PRESSURE: 53 MMHG | WEIGHT: 201.5 LBS | HEART RATE: 78 BPM | TEMPERATURE: 97.6 F | SYSTOLIC BLOOD PRESSURE: 92 MMHG | OXYGEN SATURATION: 96 % | HEIGHT: 64 IN

## 2024-10-24 DIAGNOSIS — K86.1 CHRONIC PANCREATITIS, UNSPECIFIED PANCREATITIS TYPE: Primary | ICD-10-CM

## 2024-10-24 LAB
ALBUMIN SERPL-MCNC: 4.3 G/DL (ref 3.5–5.2)
ALBUMIN/GLOB SERPL: 1.4 G/DL
ALP SERPL-CCNC: 109 U/L (ref 39–117)
ALT SERPL W P-5'-P-CCNC: 18 U/L (ref 1–33)
ANION GAP SERPL CALCULATED.3IONS-SCNC: 11.2 MMOL/L (ref 5–15)
AST SERPL-CCNC: 22 U/L (ref 1–32)
BACTERIA UR QL AUTO: NORMAL /HPF
BASOPHILS # BLD AUTO: 0.02 10*3/MM3 (ref 0–0.2)
BASOPHILS NFR BLD AUTO: 0.2 % (ref 0–1.5)
BILIRUB SERPL-MCNC: 0.3 MG/DL (ref 0–1.2)
BILIRUB UR QL STRIP: NEGATIVE
BUN SERPL-MCNC: 10 MG/DL (ref 6–20)
BUN/CREAT SERPL: 12 (ref 7–25)
CALCIUM SPEC-SCNC: 9.3 MG/DL (ref 8.6–10.5)
CHLORIDE SERPL-SCNC: 104 MMOL/L (ref 98–107)
CLARITY UR: CLEAR
CO2 SERPL-SCNC: 24.8 MMOL/L (ref 22–29)
COLOR UR: YELLOW
CREAT SERPL-MCNC: 0.83 MG/DL (ref 0.57–1)
DEPRECATED RDW RBC AUTO: 44 FL (ref 37–54)
EGFRCR SERPLBLD CKD-EPI 2021: 88.2 ML/MIN/1.73
EOSINOPHIL # BLD AUTO: 0.14 10*3/MM3 (ref 0–0.4)
EOSINOPHIL NFR BLD AUTO: 1.6 % (ref 0.3–6.2)
ERYTHROCYTE [DISTWIDTH] IN BLOOD BY AUTOMATED COUNT: 13 % (ref 12.3–15.4)
GLOBULIN UR ELPH-MCNC: 3 GM/DL
GLUCOSE SERPL-MCNC: 115 MG/DL (ref 65–99)
GLUCOSE UR STRIP-MCNC: NEGATIVE MG/DL
HCT VFR BLD AUTO: 40.7 % (ref 34–46.6)
HGB BLD-MCNC: 13.1 G/DL (ref 12–15.9)
HGB UR QL STRIP.AUTO: ABNORMAL
HYALINE CASTS UR QL AUTO: NORMAL /LPF
IMM GRANULOCYTES # BLD AUTO: 0.02 10*3/MM3 (ref 0–0.05)
IMM GRANULOCYTES NFR BLD AUTO: 0.2 % (ref 0–0.5)
KETONES UR QL STRIP: NEGATIVE
LEUKOCYTE ESTERASE UR QL STRIP.AUTO: NEGATIVE
LIPASE SERPL-CCNC: 49 U/L (ref 13–60)
LYMPHOCYTES # BLD AUTO: 3.5 10*3/MM3 (ref 0.7–3.1)
LYMPHOCYTES NFR BLD AUTO: 40.1 % (ref 19.6–45.3)
MCH RBC QN AUTO: 29.5 PG (ref 26.6–33)
MCHC RBC AUTO-ENTMCNC: 32.2 G/DL (ref 31.5–35.7)
MCV RBC AUTO: 91.7 FL (ref 79–97)
MONOCYTES # BLD AUTO: 0.46 10*3/MM3 (ref 0.1–0.9)
MONOCYTES NFR BLD AUTO: 5.3 % (ref 5–12)
NEUTROPHILS NFR BLD AUTO: 4.58 10*3/MM3 (ref 1.7–7)
NEUTROPHILS NFR BLD AUTO: 52.6 % (ref 42.7–76)
NITRITE UR QL STRIP: NEGATIVE
NRBC BLD AUTO-RTO: 0 /100 WBC (ref 0–0.2)
PH UR STRIP.AUTO: 5.5 [PH] (ref 5–8)
PLATELET # BLD AUTO: 222 10*3/MM3 (ref 140–450)
PMV BLD AUTO: 10.9 FL (ref 6–12)
POTASSIUM SERPL-SCNC: 3.7 MMOL/L (ref 3.5–5.2)
PROT SERPL-MCNC: 7.3 G/DL (ref 6–8.5)
PROT UR QL STRIP: NEGATIVE
RBC # BLD AUTO: 4.44 10*6/MM3 (ref 3.77–5.28)
RBC # UR STRIP: NORMAL /HPF
REF LAB TEST METHOD: NORMAL
SODIUM SERPL-SCNC: 140 MMOL/L (ref 136–145)
SP GR UR STRIP: 1.01 (ref 1–1.03)
SQUAMOUS #/AREA URNS HPF: NORMAL /HPF
UROBILINOGEN UR QL STRIP: ABNORMAL
WBC # UR STRIP: NORMAL /HPF
WBC NRBC COR # BLD AUTO: 8.72 10*3/MM3 (ref 3.4–10.8)

## 2024-10-24 PROCEDURE — 25810000003 LACTATED RINGERS SOLUTION: Performed by: EMERGENCY MEDICINE

## 2024-10-24 PROCEDURE — 85025 COMPLETE CBC W/AUTO DIFF WBC: CPT | Performed by: EMERGENCY MEDICINE

## 2024-10-24 PROCEDURE — 99283 EMERGENCY DEPT VISIT LOW MDM: CPT

## 2024-10-24 PROCEDURE — 83690 ASSAY OF LIPASE: CPT | Performed by: EMERGENCY MEDICINE

## 2024-10-24 PROCEDURE — 25010000002 HYDROMORPHONE 1 MG/ML SOLUTION: Performed by: EMERGENCY MEDICINE

## 2024-10-24 PROCEDURE — 25010000002 ONDANSETRON PER 1 MG: Performed by: EMERGENCY MEDICINE

## 2024-10-24 PROCEDURE — 96375 TX/PRO/DX INJ NEW DRUG ADDON: CPT

## 2024-10-24 PROCEDURE — 81001 URINALYSIS AUTO W/SCOPE: CPT | Performed by: EMERGENCY MEDICINE

## 2024-10-24 PROCEDURE — 96374 THER/PROPH/DIAG INJ IV PUSH: CPT

## 2024-10-24 PROCEDURE — 80053 COMPREHEN METABOLIC PANEL: CPT | Performed by: EMERGENCY MEDICINE

## 2024-10-24 RX ORDER — HYDROCODONE BITARTRATE AND ACETAMINOPHEN 5; 325 MG/1; MG/1
1 TABLET ORAL EVERY 6 HOURS PRN
Qty: 12 TABLET | Refills: 0 | Status: SHIPPED | OUTPATIENT
Start: 2024-10-24

## 2024-10-24 RX ORDER — SODIUM CHLORIDE 0.9 % (FLUSH) 0.9 %
10 SYRINGE (ML) INJECTION AS NEEDED
Status: DISCONTINUED | OUTPATIENT
Start: 2024-10-24 | End: 2024-10-25 | Stop reason: HOSPADM

## 2024-10-24 RX ORDER — ONDANSETRON 2 MG/ML
4 INJECTION INTRAMUSCULAR; INTRAVENOUS ONCE
Status: COMPLETED | OUTPATIENT
Start: 2024-10-24 | End: 2024-10-24

## 2024-10-24 RX ADMIN — SODIUM CHLORIDE, POTASSIUM CHLORIDE, SODIUM LACTATE AND CALCIUM CHLORIDE 500 ML: 600; 310; 30; 20 INJECTION, SOLUTION INTRAVENOUS at 21:23

## 2024-10-24 RX ADMIN — ONDANSETRON 4 MG: 2 INJECTION, SOLUTION INTRAMUSCULAR; INTRAVENOUS at 21:24

## 2024-10-24 RX ADMIN — HYDROMORPHONE HYDROCHLORIDE 1 MG: 1 INJECTION, SOLUTION INTRAMUSCULAR; INTRAVENOUS; SUBCUTANEOUS at 21:24

## 2024-10-24 NOTE — Clinical Note
[facility] EMERGENCY DEPARTMENT  [address] [facility] [place] [address]  Phone: [phone]    Dianna Claros was seen and treated in our emergency department on 10/24/2024.  She may return to work on 10/26/2024.         Thank you for choosing [facility].    Rubin Brown RN

## 2024-10-24 NOTE — TELEPHONE ENCOUNTER
Caller: Dianna Claros    Relationship: Self    Best call back number: [phone]    What orders are you requesting (i.e. lab or imaging): LIPASE LEVELS    In what timeframe would the patient need to come in: ASAP     Where will you receive your lab/imaging services: [facility]    Additional notes:   PLEASE CALL TO CONFIRM OR DISCUSS.

## 2024-10-25 NOTE — ED PROVIDER NOTES
Subjective   History of Present Illness  Chief complaint abdominal pain pancreatitis    History of present illness this is a 48-year-old female with a history of chronic pancreatitis with unknown etiology who complains of 3-day history of left upper abdominal pain nausea vomiting yesterday but none today no diarrhea.  No chest pain neck arm jaw pain or shortness of breath no fever chills no urinary complaints.  No black stool or bloody vomitus.  She states it feels like all of her episodes of previous pancreatitis.  No trauma no one at home with similar illness.      Review of Systems   Constitutional:  Negative for chills and fever.   Respiratory:  Negative for cough, chest tightness and shortness of breath.    Cardiovascular:  Negative for chest pain and palpitations.   Gastrointestinal:  Positive for abdominal pain and vomiting. Negative for blood in stool.   Genitourinary:  Negative for difficulty urinating and dysuria.   Musculoskeletal:  Negative for back pain and neck pain.   Skin:  Negative for rash.       Past Medical History:   Diagnosis Date    Endometriosis     Fibromyalgia     Hyperlipidemia     Hypertension     Injury of back        Allergies   Allergen Reactions    Roxicodone [Oxycodone Hcl] Itching and Nausea Only    Toradol [Ketorolac Tromethamine] Itching and Irritability    Gabapentin Nausea And Vomiting    Naproxen Nausea And Vomiting    Pregabalin Hives    Morphine Itching       Past Surgical History:   Procedure Laterality Date    ABDOMINAL SURGERY       SECTION      CHOLECYSTECTOMY WITH INTRAOPERATIVE CHOLANGIOGRAM N/A 2022    Procedure: CHOLECYSTECTOMY LAPAROSCOPIC INTRAOPERATIVE CHOLANGIOGRAM;  Surgeon: Robert Dale MD;  Location: [facility] MAIN OR;  Service: General;  Laterality: N/A;    COLONOSCOPY N/A 3/7/2023    Procedure: COLONOSCOPY with ileum and large intestine random biopsies R/O microscopic colitis;  Surgeon: NAVIN Schneider MD;  Location: [facility] ENDOSCOPY;  Service:  Gastroenterology;  Laterality: N/A;  hemorrhoids    ENDOSCOPY N/A 5/30/2022    Procedure: ESOPHAGOGASTRODUODENOSCOPY WITH BIOPSY;  Surgeon: NAVIN Schneider MD;  Location: [facility] ENDOSCOPY;  Service: Gastroenterology;  Laterality: N/A;    HYSTERECTOMY      UPPER ENDOSCOPIC ULTRASOUND W/ FNA N/A 12/4/2023    Procedure: ESOPHAGOGASTRODUODENOSCOPY WITH ENDOSCOPIC ULTRASOUND;  Surgeon: Malik King MD;  Location: [facility] ENDOSCOPY;  Service: Gastroenterology;  Laterality: N/A;  POST: CHRONIC PANCREATITIS       Family History   Problem Relation Age of Onset    Cancer Mother     Heart disease Father        Social History     Socioeconomic History    Marital status:    Tobacco Use    Smoking status: Every Day     Current packs/day: 1.00     Average packs/day: 1 pack/day for 34.4 years (34.4 ttl pk-yrs)     Types: Cigarettes     Start date: 6/5/1990    Smokeless tobacco: Never   Vaping Use    Vaping status: Never Used   Substance and Sexual Activity    Alcohol use: Never    Drug use: Never    Sexual activity: Defer     Prior to Admission medications    Medication Sig Start Date End Date Taking? Authorizing Provider   acetaminophen (TYLENOL) 325 MG tablet Take 2 tablets by mouth Every 6 (Six) Hours As Needed for Mild Pain.    Doyle Raymundo MD   colesevelam (WELCHOL) 625 MG tablet Take 1 tablet by mouth 2 (Two) Times a Day With Meals.    Doyle Raymundo MD   famotidine (PEPCID) 20 MG tablet Take 1 tablet by mouth Every Morning.    Doyle Raymundo MD   famotidine (PEPCID) 40 MG tablet Take 1 tablet by mouth Every Night.    Doyle Raymundo MD   HYDROcodone-acetaminophen (NORCO) 5-325 MG per tablet Take 1 tablet by mouth Every 6 (Six) Hours As Needed for Severe Pain. 10/24/24   Nico Rey MD   hyoscyamine (LEVSIN) 0.125 MG SL tablet Place 1 tablet under the tongue Every 6 (Six) Hours As Needed for Cramping. 3/19/24   Doyle Raymundo MD   nortriptyline (PAMELOR) 10 MG capsule Take 1  capsule by mouth Every Night.    Doyle Raymundo MD   ondansetron (ZOFRAN) 4 MG tablet Take 1 tablet by mouth Every 8 (Eight) Hours As Needed for Nausea or Vomiting. 9/2/24   Nico Rivas MD   ondansetron ODT (ZOFRAN-ODT) 4 MG disintegrating tablet Place 1 tablet on the tongue Every 8 (Eight) Hours As Needed for Nausea or Vomiting.    Doyle Raymundo MD   Pancrelipase, Lip-Prot-Amyl, (Zenpep) 88709-71873 units capsule delayed-release particles Take 2 capsules by mouth 3 (Three) Times a Day With Meals.    Doyle Raymundo MD   prochlorperazine (COMPAZINE) 10 MG tablet Take 1 tablet by mouth Every 6 (Six) Hours As Needed for Nausea or Vomiting. 4/4/24   Jordan York MD   Patient is not on hydrocodone      Objective   Physical Exam  Constitutional this is a 48-year-old female awake alert no acute distress triage vital signs reviewed.  HEENT extraocular muscles intact pupils equal reactive sclerae clear  Neck supple no adenopathy  Lungs clear no retraction no use of accessory  Heart regular without murmur rub  Abdomen soft mild left upper quadrant tenderness but no rebound or guarding good bowel sounds no peritoneal findings or pulsatile masses  Extremities pulses equal in all extremities no edema cords or Homans' sign or evidence of DVT  Skin warm dry without rashes neurologic awake alert follows commands motor strength normal without focal weakness  Procedures           ED Course      Results for orders placed or performed during the hospital encounter of 10/24/24   Comprehensive Metabolic Panel    Collection Time: 10/24/24  9:13 PM    Specimen: Arm, Left; Blood   Result Value Ref Range    Glucose 115 (H) 65 - 99 mg/dL    BUN 10 6 - 20 mg/dL    Creatinine 0.83 0.57 - 1.00 mg/dL    Sodium 140 136 - 145 mmol/L    Potassium 3.7 3.5 - 5.2 mmol/L    Chloride 104 98 - 107 mmol/L    CO2 24.8 22.0 - 29.0 mmol/L    Calcium 9.3 8.6 - 10.5 mg/dL    Total Protein 7.3 6.0 - 8.5 g/dL    Albumin 4.3 3.5 -  5.2 g/dL    ALT (SGPT) 18 1 - 33 U/L    AST (SGOT) 22 1 - 32 U/L    Alkaline Phosphatase 109 39 - 117 U/L    Total Bilirubin 0.3 0.0 - 1.2 mg/dL    Globulin 3.0 gm/dL    A/G Ratio 1.4 g/dL    BUN/Creatinine Ratio 12.0 7.0 - 25.0    Anion Gap 11.2 5.0 - 15.0 mmol/L    eGFR 88.2 >60.0 mL/min/1.73   Lipase    Collection Time: 10/24/24  9:13 PM    Specimen: Arm, Left; Blood   Result Value Ref Range    Lipase 49 13 - 60 U/L   CBC Auto Differential    Collection Time: 10/24/24  9:13 PM    Specimen: Arm, Left; Blood   Result Value Ref Range    WBC 8.72 3.40 - 10.80 10*3/mm3    RBC 4.44 3.77 - 5.28 10*6/mm3    Hemoglobin 13.1 12.0 - 15.9 g/dL    Hematocrit 40.7 34.0 - 46.6 %    MCV 91.7 79.0 - 97.0 fL    MCH 29.5 26.6 - 33.0 pg    MCHC 32.2 31.5 - 35.7 g/dL    RDW 13.0 12.3 - 15.4 %    RDW-SD 44.0 37.0 - 54.0 fl    MPV 10.9 6.0 - 12.0 fL    Platelets 222 140 - 450 10*3/mm3    Neutrophil % 52.6 42.7 - 76.0 %    Lymphocyte % 40.1 19.6 - 45.3 %    Monocyte % 5.3 5.0 - 12.0 %    Eosinophil % 1.6 0.3 - 6.2 %    Basophil % 0.2 0.0 - 1.5 %    Immature Grans % 0.2 0.0 - 0.5 %    Neutrophils, Absolute 4.58 1.70 - 7.00 10*3/mm3    Lymphocytes, Absolute 3.50 (H) 0.70 - 3.10 10*3/mm3    Monocytes, Absolute 0.46 0.10 - 0.90 10*3/mm3    Eosinophils, Absolute 0.14 0.00 - 0.40 10*3/mm3    Basophils, Absolute 0.02 0.00 - 0.20 10*3/mm3    Immature Grans, Absolute 0.02 0.00 - 0.05 10*3/mm3    nRBC 0.0 0.0 - 0.2 /100 WBC   Urinalysis With Microscopic If Indicated (No Culture) - Urine, Clean Catch    Collection Time: 10/24/24  9:14 PM    Specimen: Urine, Clean Catch   Result Value Ref Range    Color, UA Yellow Yellow, Straw    Appearance, UA Clear Clear    pH, UA 5.5 5.0 - 8.0    Specific Gravity, UA 1.014 1.005 - 1.030    Glucose, UA Negative Negative    Ketones, UA Negative Negative    Bilirubin, UA Negative Negative    Blood, UA Small (1+) (A) Negative    Protein, UA Negative Negative    Leuk Esterase, UA Negative Negative    Nitrite, UA  Negative Negative    Urobilinogen, UA 0.2 E.U./dL 0.2 - 1.0 E.U./dL   Urinalysis, Microscopic Only - Urine, Clean Catch    Collection Time: 10/24/24  9:14 PM    Specimen: Urine, Clean Catch   Result Value Ref Range    RBC, UA 0-2 None Seen, 0-2 /HPF    WBC, UA 0-2 None Seen, 0-2 /HPF    Bacteria, UA None Seen None Seen /HPF    Squamous Epithelial Cells, UA 0-2 None Seen, 0-2 /HPF    Hyaline Casts, UA None Seen None Seen /LPF    Methodology Automated Microscopy      No radiology results for the last day  Medications   sodium chloride 0.9 % flush 10 mL (has no administration in time range)   lactated ringers bolus 500 mL (0 mL Intravenous Stopped 10/24/24 2155)   HYDROmorphone (DILAUDID) injection 1 mg (1 mg Intravenous Given 10/24/24 2124)   ondansetron (ZOFRAN) injection 4 mg (4 mg Intravenous Given 10/24/24 2124)                                                Medical Decision Making  Medical Decision Making.  IV established monitor placement review of sinus rhythm.  Lactated ringer 500 cc bolus Dilaudid 1 mg IV Zofran 4 mg IV.  Labs obtained my independent review comprehensive metabolic profile liver lipase CBC unremarkable.  Urine negative.  Patient repeat exam is feeling much better pain was much improved mild left upper quadrant trace but no rebound no guarding good bowel sounds no peritoneal findings or pulsatile masses.  The patient's record reviewed she had a CT scan done a month ago which showed chronic calcifications in the pancreas but no other acute findings or other acute abnormalities on the CT scan.  She is feeling much better here I did not see the need to repeat that CAT scan it was just done a month ago.  She has long history of chronic pancreatitis and I suspect it is related to that.  I do not see anything on the clinical exam that would suggest acute cardiac etiology or perforation free air aneurysm dissection ischemic bowel or bowel obstruction or acute diverticulitis or appendicitis or acute  infectious etiology.  Not a complete list of all possibilities.  I did discuss this with the patient and we talked about CTs and radiation and she is very comfortable not doing a CT.  She will be discharged home.  Patient's inspect reviewed.  Given a couple days of hydrocodone we talked with the appropriate diet and she voiced understanding was discharged home stable unremarkable ER course.    Problems Addressed:  Chronic pancreatitis, unspecified pancreatitis type: complicated acute illness or injury    Amount and/or Complexity of Data Reviewed  External Data Reviewed: radiology.  Labs: ordered. Decision-making details documented in ED Course.    Risk  Prescription drug management.  Parenteral controlled substances.        Final diagnoses:   Chronic pancreatitis, unspecified pancreatitis type       ED Disposition  ED Disposition       ED Disposition   Discharge    Condition   Stable    Comment   --               Anita Blanco MD  [address] [facility] [place] [address] [phone]    In 1 day           Medication List        New Prescriptions      HYDROcodone-acetaminophen 5-325 MG per tablet  Commonly known as: NORCO  Take 1 tablet by mouth Every 6 (Six) Hours As Needed for Severe Pain.               Where to Get Your Medications        These medications were sent to [facility] - [place], [place] - [address]  - [phone] [facility] [phone] [address]  [address]  [place] [address]      Phone: [phone]   HYDROcodone-acetaminophen 5-325 MG per tablet            Nico Rey MD  10/25/24 2436

## 2024-10-25 NOTE — DISCHARGE INSTRUCTIONS
[place] sent to your pharmacy.  Will make you sleepy and constipated increase fluid and fiber.  Is addicting limit use.  Easy diet no greasy fried fatty spicy food or caffeine or alcohol.  Please go mostly liquid diet next couple days and advance slowly.  Follow-up with GI as scheduled next week.  Return for fever vomiting blood black or bloody stool chest pain neck arm jaw pain shortness of breath uncontrolled pain or any other new or worse problems or concerns return immediately to the ER.

## 2024-10-28 ENCOUNTER — OFFICE (OUTPATIENT)
Age: 49
End: 2024-10-28
Payer: COMMERCIAL

## 2024-10-28 ENCOUNTER — OFFICE (OUTPATIENT)
Dept: URBAN - METROPOLITAN AREA CLINIC 64 | Facility: CLINIC | Age: 49
End: 2024-10-28
Payer: COMMERCIAL

## 2024-10-28 VITALS
DIASTOLIC BLOOD PRESSURE: 88 MMHG | SYSTOLIC BLOOD PRESSURE: 126 MMHG | HEART RATE: 85 BPM | HEART RATE: 85 BPM | WEIGHT: 201 LBS | DIASTOLIC BLOOD PRESSURE: 88 MMHG | HEIGHT: 64 IN | SYSTOLIC BLOOD PRESSURE: 126 MMHG | HEIGHT: 64 IN | WEIGHT: 201 LBS

## 2024-10-28 DIAGNOSIS — K86.1 OTHER CHRONIC PANCREATITIS: ICD-10-CM

## 2024-10-28 DIAGNOSIS — F17.290 NICOTINE DEPENDENCE, OTHER TOBACCO PRODUCT, UNCOMPLICATED: ICD-10-CM

## 2024-10-28 DIAGNOSIS — R10.9 UNSPECIFIED ABDOMINAL PAIN: ICD-10-CM

## 2024-10-28 PROCEDURE — 99214 OFFICE O/P EST MOD 30 MIN: CPT | Performed by: INTERNAL MEDICINE

## 2024-10-28 RX ORDER — DICYCLOMINE HYDROCHLORIDE 20 MG/1
80 TABLET ORAL
Qty: 120 | Refills: 12 | Status: ACTIVE
Start: 2024-10-28

## 2024-10-28 RX ORDER — NORTRIPTYLINE HYDROCHLORIDE 50 MG/1
50 CAPSULE ORAL
Qty: 90 | Refills: 3 | Status: ACTIVE
Start: 2024-05-20

## 2024-11-13 ENCOUNTER — HOSPITAL ENCOUNTER (INPATIENT)
Facility: HOSPITAL | Age: 49
LOS: 3 days | Discharge: HOME | DRG: 440 | End: 2024-11-16
Attending: INTERNAL MEDICINE | Admitting: STUDENT IN AN ORGANIZED HEALTH CARE EDUCATION/TRAINING PROGRAM
Payer: COMMERCIAL

## 2024-11-13 ENCOUNTER — APPOINTMENT (OUTPATIENT)
Dept: CT IMAGING | Facility: HOSPITAL | Age: 49
DRG: 440 | End: 2024-11-13
Payer: COMMERCIAL

## 2024-11-13 DIAGNOSIS — R10.13 EPIGASTRIC PAIN: Primary | ICD-10-CM

## 2024-11-13 DIAGNOSIS — K86.1 CHRONIC PANCREATITIS, UNSPECIFIED PANCREATITIS TYPE: ICD-10-CM

## 2024-11-13 DIAGNOSIS — R11.2 NAUSEA AND VOMITING, UNSPECIFIED VOMITING TYPE: ICD-10-CM

## 2024-11-13 LAB
ALBUMIN SERPL-MCNC: 4.3 G/DL (ref 3.5–5.2)
ALBUMIN/GLOB SERPL: 1.4 G/DL
ALP SERPL-CCNC: 118 U/L (ref 39–117)
ALT SERPL W P-5'-P-CCNC: 17 U/L (ref 1–33)
ANION GAP SERPL CALCULATED.3IONS-SCNC: 10.8 MMOL/L (ref 5–15)
AST SERPL-CCNC: 18 U/L (ref 1–32)
BASOPHILS # BLD AUTO: 0.03 10*3/MM3 (ref 0–0.2)
BASOPHILS NFR BLD AUTO: 0.3 % (ref 0–1.5)
BILIRUB SERPL-MCNC: 0.3 MG/DL (ref 0–1.2)
BILIRUB UR QL STRIP: NEGATIVE
BUN SERPL-MCNC: 12 MG/DL (ref 6–20)
BUN/CREAT SERPL: 14.5 (ref 7–25)
CALCIUM SPEC-SCNC: 9.6 MG/DL (ref 8.6–10.5)
CHLORIDE SERPL-SCNC: 106 MMOL/L (ref 98–107)
CLARITY UR: CLEAR
CO2 SERPL-SCNC: 27.2 MMOL/L (ref 22–29)
COLOR UR: YELLOW
CREAT SERPL-MCNC: 0.83 MG/DL (ref 0.57–1)
DEPRECATED RDW RBC AUTO: 43.6 FL (ref 37–54)
EGFRCR SERPLBLD CKD-EPI 2021: 88.2 ML/MIN/1.73
EOSINOPHIL # BLD AUTO: 0.12 10*3/MM3 (ref 0–0.4)
EOSINOPHIL NFR BLD AUTO: 1.3 % (ref 0.3–6.2)
ERYTHROCYTE [DISTWIDTH] IN BLOOD BY AUTOMATED COUNT: 12.8 % (ref 12.3–15.4)
GLOBULIN UR ELPH-MCNC: 3 GM/DL
GLUCOSE SERPL-MCNC: 94 MG/DL (ref 65–99)
GLUCOSE UR STRIP-MCNC: NEGATIVE MG/DL
HCT VFR BLD AUTO: 40.3 % (ref 34–46.6)
HGB BLD-MCNC: 13.1 G/DL (ref 12–15.9)
HGB UR QL STRIP.AUTO: NEGATIVE
HOLD SPECIMEN: NORMAL
HOLD SPECIMEN: NORMAL
IMM GRANULOCYTES # BLD AUTO: 0.02 10*3/MM3 (ref 0–0.05)
IMM GRANULOCYTES NFR BLD AUTO: 0.2 % (ref 0–0.5)
KETONES UR QL STRIP: NEGATIVE
LEUKOCYTE ESTERASE UR QL STRIP.AUTO: NEGATIVE
LIPASE SERPL-CCNC: 1038 U/L (ref 13–60)
LYMPHOCYTES # BLD AUTO: 3.63 10*3/MM3 (ref 0.7–3.1)
LYMPHOCYTES NFR BLD AUTO: 40.4 % (ref 19.6–45.3)
MCH RBC QN AUTO: 29.9 PG (ref 26.6–33)
MCHC RBC AUTO-ENTMCNC: 32.5 G/DL (ref 31.5–35.7)
MCV RBC AUTO: 92 FL (ref 79–97)
MONOCYTES # BLD AUTO: 0.45 10*3/MM3 (ref 0.1–0.9)
MONOCYTES NFR BLD AUTO: 5 % (ref 5–12)
NEUTROPHILS NFR BLD AUTO: 4.73 10*3/MM3 (ref 1.7–7)
NEUTROPHILS NFR BLD AUTO: 52.8 % (ref 42.7–76)
NITRITE UR QL STRIP: NEGATIVE
NRBC BLD AUTO-RTO: 0 /100 WBC (ref 0–0.2)
PH UR STRIP.AUTO: 5.5 [PH] (ref 5–8)
PLATELET # BLD AUTO: 221 10*3/MM3 (ref 140–450)
PMV BLD AUTO: 10.6 FL (ref 6–12)
POTASSIUM SERPL-SCNC: 4.3 MMOL/L (ref 3.5–5.2)
PROT SERPL-MCNC: 7.3 G/DL (ref 6–8.5)
PROT UR QL STRIP: NEGATIVE
RBC # BLD AUTO: 4.38 10*6/MM3 (ref 3.77–5.28)
SODIUM SERPL-SCNC: 144 MMOL/L (ref 136–145)
SP GR UR STRIP: 1.01 (ref 1–1.03)
UROBILINOGEN UR QL STRIP: NORMAL
WBC NRBC COR # BLD AUTO: 8.98 10*3/MM3 (ref 3.4–10.8)
WHOLE BLOOD HOLD COAG: NORMAL
WHOLE BLOOD HOLD SPECIMEN: NORMAL

## 2024-11-13 PROCEDURE — 25010000002 ONDANSETRON PER 1 MG

## 2024-11-13 PROCEDURE — 80053 COMPREHEN METABOLIC PANEL: CPT

## 2024-11-13 PROCEDURE — 25810000003 LACTATED RINGERS PER 1000 ML

## 2024-11-13 PROCEDURE — 74177 CT ABD & PELVIS W/CONTRAST: CPT

## 2024-11-13 PROCEDURE — 25810000003 SODIUM CHLORIDE 0.9 % SOLUTION

## 2024-11-13 PROCEDURE — 83690 ASSAY OF LIPASE: CPT

## 2024-11-13 PROCEDURE — 25510000001 IOPAMIDOL PER 1 ML

## 2024-11-13 PROCEDURE — 99285 EMERGENCY DEPT VISIT HI MDM: CPT

## 2024-11-13 PROCEDURE — 81003 URINALYSIS AUTO W/O SCOPE: CPT

## 2024-11-13 PROCEDURE — 25010000002 HYDROMORPHONE 1 MG/ML SOLUTION

## 2024-11-13 PROCEDURE — 85025 COMPLETE CBC W/AUTO DIFF WBC: CPT

## 2024-11-13 RX ORDER — ONDANSETRON 2 MG/ML
4 INJECTION INTRAMUSCULAR; INTRAVENOUS ONCE
Status: COMPLETED | OUTPATIENT
Start: 2024-11-13 | End: 2024-11-13

## 2024-11-13 RX ORDER — SODIUM CHLORIDE 0.9 % (FLUSH) 0.9 %
10 SYRINGE (ML) INJECTION AS NEEDED
Status: DISCONTINUED | OUTPATIENT
Start: 2024-11-13 | End: 2024-11-16 | Stop reason: HOSPADM

## 2024-11-13 RX ORDER — IOPAMIDOL 755 MG/ML
100 INJECTION, SOLUTION INTRAVASCULAR
Status: COMPLETED | OUTPATIENT
Start: 2024-11-13 | End: 2024-11-13

## 2024-11-13 RX ORDER — ONDANSETRON 2 MG/ML
4 INJECTION INTRAMUSCULAR; INTRAVENOUS EVERY 4 HOURS PRN
Status: DISCONTINUED | OUTPATIENT
Start: 2024-11-13 | End: 2024-11-13 | Stop reason: SDUPTHER

## 2024-11-13 RX ORDER — ACETAMINOPHEN 325 MG/1
650 TABLET ORAL EVERY 4 HOURS PRN
Status: DISCONTINUED | OUTPATIENT
Start: 2024-11-13 | End: 2024-11-16 | Stop reason: HOSPADM

## 2024-11-13 RX ORDER — POLYETHYLENE GLYCOL 3350 17 G/17G
17 POWDER, FOR SOLUTION ORAL DAILY PRN
Status: DISCONTINUED | OUTPATIENT
Start: 2024-11-13 | End: 2024-11-16 | Stop reason: HOSPADM

## 2024-11-13 RX ORDER — BISACODYL 10 MG
10 SUPPOSITORY, RECTAL RECTAL DAILY PRN
Status: DISCONTINUED | OUTPATIENT
Start: 2024-11-13 | End: 2024-11-16 | Stop reason: HOSPADM

## 2024-11-13 RX ORDER — SODIUM CHLORIDE 9 MG/ML
40 INJECTION, SOLUTION INTRAVENOUS AS NEEDED
Status: DISCONTINUED | OUTPATIENT
Start: 2024-11-13 | End: 2024-11-16 | Stop reason: HOSPADM

## 2024-11-13 RX ORDER — SODIUM CHLORIDE 0.9 % (FLUSH) 0.9 %
10 SYRINGE (ML) INJECTION EVERY 12 HOURS SCHEDULED
Status: DISCONTINUED | OUTPATIENT
Start: 2024-11-13 | End: 2024-11-16 | Stop reason: HOSPADM

## 2024-11-13 RX ORDER — ONDANSETRON 2 MG/ML
4 INJECTION INTRAMUSCULAR; INTRAVENOUS EVERY 6 HOURS PRN
Status: DISCONTINUED | OUTPATIENT
Start: 2024-11-13 | End: 2024-11-14 | Stop reason: SDUPTHER

## 2024-11-13 RX ORDER — ACETAMINOPHEN 650 MG/1
650 SUPPOSITORY RECTAL EVERY 4 HOURS PRN
Status: DISCONTINUED | OUTPATIENT
Start: 2024-11-13 | End: 2024-11-16 | Stop reason: HOSPADM

## 2024-11-13 RX ORDER — AMOXICILLIN 250 MG
2 CAPSULE ORAL 2 TIMES DAILY PRN
Status: DISCONTINUED | OUTPATIENT
Start: 2024-11-13 | End: 2024-11-16 | Stop reason: HOSPADM

## 2024-11-13 RX ORDER — SODIUM CHLORIDE, SODIUM LACTATE, POTASSIUM CHLORIDE, CALCIUM CHLORIDE 600; 310; 30; 20 MG/100ML; MG/100ML; MG/100ML; MG/100ML
125 INJECTION, SOLUTION INTRAVENOUS CONTINUOUS
Status: DISCONTINUED | OUTPATIENT
Start: 2024-11-13 | End: 2024-11-16 | Stop reason: HOSPADM

## 2024-11-13 RX ORDER — BISACODYL 5 MG/1
5 TABLET, DELAYED RELEASE ORAL DAILY PRN
Status: DISCONTINUED | OUTPATIENT
Start: 2024-11-13 | End: 2024-11-16 | Stop reason: HOSPADM

## 2024-11-13 RX ORDER — ACETAMINOPHEN 160 MG/5ML
650 SOLUTION ORAL EVERY 4 HOURS PRN
Status: DISCONTINUED | OUTPATIENT
Start: 2024-11-13 | End: 2024-11-16 | Stop reason: HOSPADM

## 2024-11-13 RX ADMIN — HYDROMORPHONE HYDROCHLORIDE 0.5 MG: 1 INJECTION, SOLUTION INTRAMUSCULAR; INTRAVENOUS; SUBCUTANEOUS at 19:15

## 2024-11-13 RX ADMIN — ONDANSETRON 4 MG: 2 INJECTION, SOLUTION INTRAMUSCULAR; INTRAVENOUS at 19:15

## 2024-11-13 RX ADMIN — IOPAMIDOL 100 ML: 755 INJECTION, SOLUTION INTRAVENOUS at 20:36

## 2024-11-13 RX ADMIN — HYDROMORPHONE HYDROCHLORIDE 0.5 MG: 1 INJECTION, SOLUTION INTRAMUSCULAR; INTRAVENOUS; SUBCUTANEOUS at 20:50

## 2024-11-13 RX ADMIN — HYDROMORPHONE HYDROCHLORIDE 1 MG: 1 INJECTION, SOLUTION INTRAMUSCULAR; INTRAVENOUS; SUBCUTANEOUS at 23:10

## 2024-11-13 RX ADMIN — SODIUM CHLORIDE 1000 ML: 9 INJECTION, SOLUTION INTRAVENOUS at 19:15

## 2024-11-13 RX ADMIN — SODIUM CHLORIDE, SODIUM LACTATE, POTASSIUM CHLORIDE, CALCIUM CHLORIDE 125 ML/HR: 20; 30; 600; 310 INJECTION, SOLUTION INTRAVENOUS at 21:49

## 2024-11-13 NOTE — Clinical Note
Level of Care: Telemetry [5]   Admitting Physician: ADALBERTO CAAL [[id number]]   Attending Physician: ADALBERTO CAAL [[id number]]

## 2024-11-13 NOTE — ED PROVIDER NOTES
Subjective   Chief Complaint   Patient presents with    Abdominal Pain     Abd pain for last 4 hours pt stats she feels like her pancreas is swollen  Pt  has nausea and vomiting.         History of Present Illness  Patient is a 48-year-old female who has a history of chronic pancreatitis reports that she feels like her abdomen is swollen and is having nausea and vomiting.  States it feels exactly like her last episode of pancreatitis.  Review of Systems  HPI  Past Medical History:   Diagnosis Date    Endometriosis     Fibromyalgia     Hyperlipidemia     Hypertension     Injury of back        Allergies   Allergen Reactions    Roxicodone [Oxycodone Hcl] Itching and Nausea Only    Toradol [Ketorolac Tromethamine] Itching and Irritability    Gabapentin Nausea And Vomiting    Naproxen Nausea And Vomiting    Pregabalin Hives    Morphine Itching       Past Surgical History:   Procedure Laterality Date    ABDOMINAL SURGERY       SECTION      CHOLECYSTECTOMY WITH INTRAOPERATIVE CHOLANGIOGRAM N/A 2022    Procedure: CHOLECYSTECTOMY LAPAROSCOPIC INTRAOPERATIVE CHOLANGIOGRAM;  Surgeon: Robert Dale MD;  Location: [facility] MAIN OR;  Service: General;  Laterality: N/A;    COLONOSCOPY N/A 3/7/2023    Procedure: COLONOSCOPY with ileum and large intestine random biopsies R/O microscopic colitis;  Surgeon: NAVIN Schneider MD;  Location: [facility] ENDOSCOPY;  Service: Gastroenterology;  Laterality: N/A;  hemorrhoids    ENDOSCOPY N/A 2022    Procedure: ESOPHAGOGASTRODUODENOSCOPY WITH BIOPSY;  Surgeon: NAVIN Schneider MD;  Location: [facility] ENDOSCOPY;  Service: Gastroenterology;  Laterality: N/A;    HYSTERECTOMY      UPPER ENDOSCOPIC ULTRASOUND W/ FNA N/A 2023    Procedure: ESOPHAGOGASTRODUODENOSCOPY WITH ENDOSCOPIC ULTRASOUND;  Surgeon: Malik King MD;  Location: [facility] ENDOSCOPY;  Service: Gastroenterology;  Laterality: N/A;  POST: CHRONIC PANCREATITIS       Family History   Problem Relation Age of Onset     Cancer Mother     Heart disease Father        Social History     Socioeconomic History    Marital status:    Tobacco Use    Smoking status: Every Day     Current packs/day: 1.00     Average packs/day: 1 pack/day for 34.4 years (34.4 ttl pk-yrs)     Types: Cigarettes     Start date: 6/5/1990    Smokeless tobacco: Never   Vaping Use    Vaping status: Never Used   Substance and Sexual Activity    Alcohol use: Never    Drug use: Never    Sexual activity: Defer           Objective   Physical Exam  Vitals and nursing note reviewed.   Constitutional:       General: She is not in acute distress.     Appearance: She is well-developed. She is not ill-appearing, toxic-appearing or diaphoretic.   HENT:      Head: Normocephalic and atraumatic.      Mouth/Throat:      Mouth: Mucous membranes are moist.      Pharynx: Oropharynx is clear.   Eyes:      Extraocular Movements: Extraocular movements intact.      Pupils: Pupils are equal, round, and reactive to light.   Cardiovascular:      Rate and Rhythm: Normal rate and regular rhythm.      Heart sounds: Normal heart sounds.   Pulmonary:      Effort: Pulmonary effort is normal.      Breath sounds: Normal breath sounds.   Abdominal:      General: Bowel sounds are normal.      Palpations: Abdomen is soft.      Tenderness: There is abdominal tenderness in the right upper quadrant, epigastric area, periumbilical area and left upper quadrant. There is no right CVA tenderness, left CVA tenderness, guarding or rebound. Negative signs include Obregon's sign and McBurney's sign.      Hernia: No hernia is present.   Skin:     General: Skin is warm and dry.      Capillary Refill: Capillary refill takes less than 2 seconds.   Neurological:      General: No focal deficit present.      Mental Status: She is alert.   Psychiatric:         Mood and Affect: Mood normal.         Behavior: Behavior normal.         Procedures           ED Course  ED Course as of 11/14/24 0251   Wed Nov 13, 2024  "  1951 Patient is marked ready for CT [DT]      ED Course User Index  [DT] Estephania Clayton R, APRN                    No data recorded                   /58 (BP Location: Right arm, Patient Position: Lying)   Pulse 71   Temp 97.8 °F (36.6 °C) (Oral)   Resp 11   Ht 162.6 cm (64\")   Wt 91.5 kg (201 lb 11.5 oz)   SpO2 92%   BMI 34.63 kg/m²   Labs Reviewed   COMPREHENSIVE METABOLIC PANEL - Abnormal; Notable for the following components:       Result Value    Alkaline Phosphatase 118 (*)     All other components within normal limits    Narrative:     GFR Normal >60  Chronic Kidney Disease <60  Kidney Failure <15     LIPASE - Abnormal; Notable for the following components:    Lipase 1,038 (*)     All other components within normal limits   CBC WITH AUTO DIFFERENTIAL - Abnormal; Notable for the following components:    Lymphocytes, Absolute 3.63 (*)     All other components within normal limits   URINALYSIS W/ MICROSCOPIC IF INDICATED (NO CULTURE) - Normal    Narrative:     Urine microscopic not indicated.   RAINBOW DRAW    Narrative:     The following orders were created for panel order [place] Draw.  Procedure                               Abnormality         Status                     ---------                               -----------         ------                     Green Top (Gel)[[id number]]                                  Final result               Lavender Top[[id number]]                                     Final result               Gold Top - SST[[id number]]                                   Final result               Light Blue Top[[id number]]                                   Final result                 Please view results for these tests on the individual orders.   BASIC METABOLIC PANEL   CBC WITH AUTO DIFFERENTIAL   CBC AND DIFFERENTIAL    Narrative:     The following orders were created for panel order CBC & Differential.  Procedure                               Abnormality         Status        "              ---------                               -----------         ------                     CBC Auto Differential[[id number]]        Abnormal            Final result                 Please view results for these tests on the individual orders.   GREEN TOP   LAVENDER TOP   GOLD TOP - SST   LIGHT BLUE TOP   CBC AND DIFFERENTIAL    Narrative:     The following orders were created for panel order CBC & Differential.  Procedure                               Abnormality         Status                     ---------                               -----------         ------                     CBC Auto Differential[[id number]]                                                         Please view results for these tests on the individual orders.     .dmeds  CT Abdomen Pelvis With Contrast    Result Date: 11/13/2024  Impression: Acute interstitial edematous pancreatitis Electronically Signed: Bc Posadas  11/13/2024 8:57 PM EST  Workstation ID: [id number]             Medical Decision Making  Patient presents to the ED for the above complaint, underwent the above exam and workup.    Differential diagnosis considered: Pancreatitis, gastritis, gastroenteritis, constipation, bowel perforation, small bowel obstruction    Overall presentation is consistent with pancreatitis.      Patient was treated with the following while in the ED:  Medications   HYDROmorphone (DILAUDID) injection 1 mg (1 mg Intravenous Given 11/14/24 0109)   lactated ringers infusion (125 mL/hr Intravenous New Bag 11/13/24 2149)   sodium chloride 0.9 % flush 10 mL (10 mL Intravenous Not Given 11/13/24 2215)   sodium chloride 0.9 % flush 10 mL (has no administration in time range)   sodium chloride 0.9 % infusion 40 mL (has no administration in time range)   acetaminophen (TYLENOL) tablet 650 mg (has no administration in time range)     Or   acetaminophen (TYLENOL) 160 MG/5ML oral solution 650 mg (has no administration in time range)     Or   acetaminophen  (TYLENOL) suppository 650 mg (has no administration in time range)   sennosides-docusate (PERICOLACE) 8.6-50 MG per tablet 2 tablet (has no administration in time range)     And   polyethylene glycol (MIRALAX) packet 17 g (has no administration in time range)     And   bisacodyl (DULCOLAX) EC tablet 5 mg (has no administration in time range)     And   bisacodyl (DULCOLAX) suppository 10 mg (has no administration in time range)   ondansetron (ZOFRAN) injection 4 mg (4 mg Intravenous Given 11/14/24 0109)   melatonin tablet 5 mg (has no administration in time range)   HYDROmorphone (DILAUDID) injection 0.5 mg (0.5 mg Intravenous Given 11/13/24 1915)   ondansetron (ZOFRAN) injection 4 mg (4 mg Intravenous Given 11/13/24 1915)   sodium chloride 0.9 % bolus 1,000 mL (0 mL Intravenous Stopped 11/13/24 2149)   iopamidol (ISOVUE-370) 76 % injection 100 mL (100 mL Intravenous Given 11/13/24 2036)   HYDROmorphone (DILAUDID) injection 0.5 mg (0.5 mg Intravenous Given 11/13/24 2050)     Exam as above discussed these results with patient and desire to admit patient for pain management and necessary treatment.  Patient is agreeable to this plan.    Discussed case with JAMIL Tadeo, who agreed to admit patient.      Final diagnoses:   Epigastric pain   Nausea and vomiting, unspecified vomiting type   Chronic pancreatitis, unspecified pancreatitis type       ED Disposition  ED Disposition       ED Disposition   Decision to Admit    Condition   --    Comment   Level of Care: Med/Surg [1]   Diagnosis: Acute pancreatitis [577.0.ICD-9-CM]   Certification: I Certify That Inpatient Hospital Services Are Medically Necessary For Greater Than 2 Midnights                 No follow-up provider specified.       Medication List      No changes were made to your prescriptions during this visit.            Estephania Clayton, JAMIL  11/14/24 0117

## 2024-11-14 LAB
ANION GAP SERPL CALCULATED.3IONS-SCNC: 8.4 MMOL/L (ref 5–15)
BASOPHILS # BLD AUTO: 0.01 10*3/MM3 (ref 0–0.2)
BASOPHILS NFR BLD AUTO: 0.1 % (ref 0–1.5)
BUN SERPL-MCNC: 10 MG/DL (ref 6–20)
BUN/CREAT SERPL: 12.2 (ref 7–25)
CALCIUM SPEC-SCNC: 8.3 MG/DL (ref 8.6–10.5)
CHLORIDE SERPL-SCNC: 109 MMOL/L (ref 98–107)
CO2 SERPL-SCNC: 24.6 MMOL/L (ref 22–29)
CREAT SERPL-MCNC: 0.82 MG/DL (ref 0.57–1)
DEPRECATED RDW RBC AUTO: 44.4 FL (ref 37–54)
EGFRCR SERPLBLD CKD-EPI 2021: 89.5 ML/MIN/1.73
EOSINOPHIL # BLD AUTO: 0.08 10*3/MM3 (ref 0–0.4)
EOSINOPHIL NFR BLD AUTO: 1.2 % (ref 0.3–6.2)
ERYTHROCYTE [DISTWIDTH] IN BLOOD BY AUTOMATED COUNT: 12.9 % (ref 12.3–15.4)
GLUCOSE SERPL-MCNC: 81 MG/DL (ref 65–99)
HCT VFR BLD AUTO: 35.1 % (ref 34–46.6)
HGB BLD-MCNC: 11 G/DL (ref 12–15.9)
IMM GRANULOCYTES # BLD AUTO: 0.02 10*3/MM3 (ref 0–0.05)
IMM GRANULOCYTES NFR BLD AUTO: 0.3 % (ref 0–0.5)
LYMPHOCYTES # BLD AUTO: 2.34 10*3/MM3 (ref 0.7–3.1)
LYMPHOCYTES NFR BLD AUTO: 34.9 % (ref 19.6–45.3)
MCH RBC QN AUTO: 29.2 PG (ref 26.6–33)
MCHC RBC AUTO-ENTMCNC: 31.3 G/DL (ref 31.5–35.7)
MCV RBC AUTO: 93.1 FL (ref 79–97)
MONOCYTES # BLD AUTO: 0.42 10*3/MM3 (ref 0.1–0.9)
MONOCYTES NFR BLD AUTO: 6.3 % (ref 5–12)
NEUTROPHILS NFR BLD AUTO: 3.84 10*3/MM3 (ref 1.7–7)
NEUTROPHILS NFR BLD AUTO: 57.2 % (ref 42.7–76)
NRBC BLD AUTO-RTO: 0 /100 WBC (ref 0–0.2)
PLATELET # BLD AUTO: 181 10*3/MM3 (ref 140–450)
PMV BLD AUTO: 11 FL (ref 6–12)
POTASSIUM SERPL-SCNC: 4.1 MMOL/L (ref 3.5–5.2)
RBC # BLD AUTO: 3.77 10*6/MM3 (ref 3.77–5.28)
SODIUM SERPL-SCNC: 142 MMOL/L (ref 136–145)
WBC NRBC COR # BLD AUTO: 6.71 10*3/MM3 (ref 3.4–10.8)

## 2024-11-14 PROCEDURE — 80048 BASIC METABOLIC PNL TOTAL CA: CPT

## 2024-11-14 PROCEDURE — 25010000002 ONDANSETRON PER 1 MG: Performed by: STUDENT IN AN ORGANIZED HEALTH CARE EDUCATION/TRAINING PROGRAM

## 2024-11-14 PROCEDURE — 25010000002 HYDROMORPHONE 1 MG/ML SOLUTION

## 2024-11-14 PROCEDURE — 25810000003 LACTATED RINGERS PER 1000 ML

## 2024-11-14 PROCEDURE — 36415 COLL VENOUS BLD VENIPUNCTURE: CPT

## 2024-11-14 PROCEDURE — 85025 COMPLETE CBC W/AUTO DIFF WBC: CPT

## 2024-11-14 PROCEDURE — 25010000002 ONDANSETRON PER 1 MG

## 2024-11-14 PROCEDURE — 25010000002 PROCHLORPERAZINE 10 MG/2ML SOLUTION: Performed by: STUDENT IN AN ORGANIZED HEALTH CARE EDUCATION/TRAINING PROGRAM

## 2024-11-14 RX ORDER — ONDANSETRON 2 MG/ML
4 INJECTION INTRAMUSCULAR; INTRAVENOUS EVERY 6 HOURS PRN
Status: DISCONTINUED | OUTPATIENT
Start: 2024-11-14 | End: 2024-11-16 | Stop reason: HOSPADM

## 2024-11-14 RX ORDER — PROCHLORPERAZINE EDISYLATE 5 MG/ML
5 INJECTION INTRAMUSCULAR; INTRAVENOUS EVERY 4 HOURS PRN
Status: DISCONTINUED | OUTPATIENT
Start: 2024-11-14 | End: 2024-11-16 | Stop reason: HOSPADM

## 2024-11-14 RX ADMIN — SODIUM CHLORIDE, SODIUM LACTATE, POTASSIUM CHLORIDE, CALCIUM CHLORIDE 125 ML/HR: 20; 30; 600; 310 INJECTION, SOLUTION INTRAVENOUS at 21:52

## 2024-11-14 RX ADMIN — HYDROMORPHONE HYDROCHLORIDE 1 MG: 1 INJECTION, SOLUTION INTRAMUSCULAR; INTRAVENOUS; SUBCUTANEOUS at 09:19

## 2024-11-14 RX ADMIN — HYDROMORPHONE HYDROCHLORIDE 1 MG: 1 INJECTION, SOLUTION INTRAMUSCULAR; INTRAVENOUS; SUBCUTANEOUS at 19:01

## 2024-11-14 RX ADMIN — ONDANSETRON 4 MG: 2 INJECTION, SOLUTION INTRAMUSCULAR; INTRAVENOUS at 01:09

## 2024-11-14 RX ADMIN — PROCHLORPERAZINE EDISYLATE 5 MG: 5 INJECTION INTRAMUSCULAR; INTRAVENOUS at 11:05

## 2024-11-14 RX ADMIN — ONDANSETRON 4 MG: 2 INJECTION, SOLUTION INTRAMUSCULAR; INTRAVENOUS at 21:45

## 2024-11-14 RX ADMIN — ACETAMINOPHEN 650 MG: 325 TABLET, FILM COATED ORAL at 03:50

## 2024-11-14 RX ADMIN — HYDROMORPHONE HYDROCHLORIDE 1 MG: 1 INJECTION, SOLUTION INTRAMUSCULAR; INTRAVENOUS; SUBCUTANEOUS at 11:14

## 2024-11-14 RX ADMIN — Medication 10 ML: at 08:45

## 2024-11-14 RX ADMIN — HYDROMORPHONE HYDROCHLORIDE 1 MG: 1 INJECTION, SOLUTION INTRAMUSCULAR; INTRAVENOUS; SUBCUTANEOUS at 16:01

## 2024-11-14 RX ADMIN — SODIUM CHLORIDE, SODIUM LACTATE, POTASSIUM CHLORIDE, CALCIUM CHLORIDE 125 ML/HR: 20; 30; 600; 310 INJECTION, SOLUTION INTRAVENOUS at 11:14

## 2024-11-14 RX ADMIN — HYDROMORPHONE HYDROCHLORIDE 1 MG: 1 INJECTION, SOLUTION INTRAMUSCULAR; INTRAVENOUS; SUBCUTANEOUS at 01:09

## 2024-11-14 RX ADMIN — Medication 10 ML: at 21:51

## 2024-11-14 RX ADMIN — HYDROMORPHONE HYDROCHLORIDE 1 MG: 1 INJECTION, SOLUTION INTRAMUSCULAR; INTRAVENOUS; SUBCUTANEOUS at 21:44

## 2024-11-14 RX ADMIN — HYDROMORPHONE HYDROCHLORIDE 1 MG: 1 INJECTION, SOLUTION INTRAMUSCULAR; INTRAVENOUS; SUBCUTANEOUS at 04:11

## 2024-11-14 RX ADMIN — ONDANSETRON 4 MG: 2 INJECTION, SOLUTION INTRAMUSCULAR; INTRAVENOUS at 07:02

## 2024-11-14 RX ADMIN — HYDROMORPHONE HYDROCHLORIDE 1 MG: 1 INJECTION, SOLUTION INTRAMUSCULAR; INTRAVENOUS; SUBCUTANEOUS at 07:02

## 2024-11-14 NOTE — H&P
[facility] Medicine Services  History & Physical    Patient Name: Dianna Claros  : 1975  MRN: [id number]  Primary Care Physician:  Anita Blanco MD  Date of admission: 2024  Date and Time of Service: 2024 at 2145      Assessment & Plan      Chief Complaint: abdominal pain, nausea, vomiting    Plan:    Acute Pancreatitis  -Lipase 1038  -CT of the abdomen and pelvis reviewed, acute edematous pancreatitis noted  -IV fluids ordered  -Analgesics and antiemetics as needed  -NPO, advance as tolerated  .-Continue home pancrelipase, Pepcid, Welchol when eating        Home medications for chronic conditions will be restarted as appropriate when verified by pharmacy      History of Present Illness     History of Present Illness: Dianna Claros is a 48 y.o. female with a previous medical history of chronic pancreatitis who presented to [facility] on 2024 with  complaints of abdominal pain, nausea and vomiting that started today.  She states that she was nauseous and vomiting until she stopped eating but she continues to have pain.    In the ED, Lipase was 1038. Otherwise, labs are unremarkable.  CT of the abdomen and pelvis showed acute, edematous pancreatitis.  UA is negative.  She is afebrile, all vitals are stable.  Hospitalist was consulted for further management.    12 point ROS reviewed and negative except as mentioned above    Objective      Vitals:   Temp:  [97.7 °F (36.5 °C)-97.8 °F (36.6 °C)] 97.7 °F (36.5 °C)  Heart Rate:  [66-81] 70  Resp:  [10-20] 10  BP: ()/(40-84) 103/46  Body mass index is 34.63 kg/m².    Physical Exam  Vitals and nursing note reviewed.   Constitutional:       Appearance: Normal appearance.   HENT:      Mouth/Throat:      Mouth: Mucous membranes are moist.   Cardiovascular:      Rate and Rhythm: Normal rate and regular rhythm.   Pulmonary:      Effort: Pulmonary effort is normal.      Breath sounds: Normal breath sounds.    Abdominal:      General: Bowel sounds are normal.      Palpations: Abdomen is soft.      Tenderness: There is abdominal tenderness in the epigastric area.   Musculoskeletal:         General: Normal range of motion.   Skin:     General: Skin is warm and dry.   Neurological:      General: No focal deficit present.      Mental Status: She is alert and oriented to person, place, and time. Mental status is at baseline.   Psychiatric:         Mood and Affect: Mood normal.         Behavior: Behavior normal.            Personal History     This is a 48 y.o. female with:    Past Medical History:   Diagnosis Date    Endometriosis     Fibromyalgia     Hyperlipidemia     Hypertension     Injury of back        Past Surgical History:   Procedure Laterality Date    ABDOMINAL SURGERY       SECTION      CHOLECYSTECTOMY WITH INTRAOPERATIVE CHOLANGIOGRAM N/A 2022    Procedure: CHOLECYSTECTOMY LAPAROSCOPIC INTRAOPERATIVE CHOLANGIOGRAM;  Surgeon: Robert Dale MD;  Location: [facility] MAIN OR;  Service: General;  Laterality: N/A;    COLONOSCOPY N/A 3/7/2023    Procedure: COLONOSCOPY with ileum and large intestine random biopsies R/O microscopic colitis;  Surgeon: NAVIN Schneider MD;  Location: [facility] ENDOSCOPY;  Service: Gastroenterology;  Laterality: N/A;  hemorrhoids    ENDOSCOPY N/A 2022    Procedure: ESOPHAGOGASTRODUODENOSCOPY WITH BIOPSY;  Surgeon: NAVIN Schneider MD;  Location: [facility] ENDOSCOPY;  Service: Gastroenterology;  Laterality: N/A;    HYSTERECTOMY      UPPER ENDOSCOPIC ULTRASOUND W/ FNA N/A 2023    Procedure: ESOPHAGOGASTRODUODENOSCOPY WITH ENDOSCOPIC ULTRASOUND;  Surgeon: Malik King MD;  Location: [facility] ENDOSCOPY;  Service: Gastroenterology;  Laterality: N/A;  POST: CHRONIC PANCREATITIS       Active and Resolved Problems  Active Hospital Problems    Diagnosis  POA    **Acute pancreatitis [K85.90]  Yes      Resolved Hospital Problems   No resolved problems to display.       Family History:  family history includes Cancer in her mother; Heart disease in her father. Otherwise pertinent FHx was reviewed and not pertinent to current issue.    Social History:  reports that she has been smoking cigarettes. She started smoking about 34 years ago. She has a 34.4 pack-year smoking history. She has never used smokeless tobacco. She reports that she does not drink alcohol and does not use drugs.    Home Medications:  Prior to Admission Medications       Prescriptions Last Dose Informant Patient Reported? Taking?    acetaminophen (TYLENOL) 325 MG tablet   Yes No    Take 2 tablets by mouth Every 6 (Six) Hours As Needed for Mild Pain.    colesevelam (WELCHOL) 625 MG tablet   Yes No    Take 1 tablet by mouth 2 (Two) Times a Day With Meals.    famotidine (PEPCID) 20 MG tablet   Yes No    Take 1 tablet by mouth Every Morning.    famotidine (PEPCID) 40 MG tablet   Yes No    Take 1 tablet by mouth Every Night.    HYDROcodone-acetaminophen (NORCO) 5-325 MG per tablet   No No    Take 1 tablet by mouth Every 6 (Six) Hours As Needed for Severe Pain.    hyoscyamine (LEVSIN) 0.125 MG SL tablet   Yes No    Place 1 tablet under the tongue Every 6 (Six) Hours As Needed for Cramping.    nortriptyline (PAMELOR) 10 MG capsule   Yes No    Take 1 capsule by mouth Every Night.    ondansetron (ZOFRAN) 4 MG tablet   No No    Take 1 tablet by mouth Every 8 (Eight) Hours As Needed for Nausea or Vomiting.    ondansetron ODT (ZOFRAN-ODT) 4 MG disintegrating tablet   Yes No    Place 1 tablet on the tongue Every 8 (Eight) Hours As Needed for Nausea or Vomiting.    Pancrelipase, Lip-Prot-Amyl, (Zenpep) 97403-69902 units capsule delayed-release particles   Yes No    Take 2 capsules by mouth 3 (Three) Times a Day With Meals.    prochlorperazine (COMPAZINE) 10 MG tablet   No No    Take 1 tablet by mouth Every 6 (Six) Hours As Needed for Nausea or Vomiting.              Allergies:  Allergies   Allergen Reactions    Roxicodone [Oxycodone Hcl]  Itching and Nausea Only    Toradol [Ketorolac Tromethamine] Itching and Irritability    Gabapentin Nausea And Vomiting    Naproxen Nausea And Vomiting    Pregabalin Hives    Morphine Itching           VTE Prophylaxis:  Mechanical VTE prophylaxis orders are present.        CODE STATUS:    Code Status (Patient has no pulse and is not breathing): CPR (Attempt to Resuscitate)  Medical Interventions (Patient has pulse or is breathing): Full Support        Admission Status:  I believe this patient meets inpatient status.    I discussed the patient's findings and my recommendations with patient.    Signature:     This document has been electronically signed by Shwetha Kaufman, TRIXIE, APRN, AGACNP-BC on November 13, 2024 23:35 [facility]

## 2024-11-14 NOTE — CASE MANAGEMENT/SOCIAL WORK
Discharge Planning Assessment   Soren     Patient Name: Dianna Claros  MRN: [id number]  Today's Date: 11/14/2024    Admit Date: 11/13/2024    Plan: Return home with spouse and adult children   Discharge Needs Assessment       Row Name 11/14/24 1606       Living Environment    People in Home spouse;child(mayuri), adult    Name(s) of People in Home Will, spouse and independent adult children    Current Living Arrangements home    Potentially Unsafe Housing Conditions none    In the past 12 months has the electric, gas, oil, or water company threatened to shut off services in your home? No    Primary Care Provided by self    Provides Primary Care For no one    Family Caregiver if Needed spouse    Quality of Family Relationships helpful;involved;supportive    Able to Return to Prior Arrangements yes       Resource/Environmental Concerns    Resource/Environmental Concerns none    Transportation Concerns none       Transportation Needs    In the past 12 months, has lack of transportation kept you from medical appointments or from getting medications? no    In the past 12 months, has lack of transportation kept you from meetings, work, or from getting things needed for daily living? No       Food Insecurity    Within the past 12 months, you worried that your food would run out before you got the money to buy more. Never true    Within the past 12 months, the food you bought just didn't last and you didn't have money to get more. Never true       Transition Planning    Patient/Family Anticipates Transition to home with family    Patient/Family Anticipated Services at Transition none    Transportation Anticipated car, drives self;family or friend will provide       Discharge Needs Assessment    Readmission Within the Last 30 Days no previous admission in last 30 days    Equipment Currently Used at Home none    Concerns to be Addressed no discharge needs identified    Anticipated Changes Related to Illness none     Equipment Needed After Discharge none                   Discharge Plan       Row Name 11/14/24 1608       Plan    Plan Return home with spouse and adult children    Patient/Family in Agreement with Plan yes    Plan Comments Barriers: Clear Liquid diet, IVFs. CM met with Mrs. Claros who is a/o and reported she lives with her  and adult children. She drives, works full time and is independent with no medical equipment. She denies any financial concerns. PCP and PHarmacy verified.  No anticipated discharge needs                  Demographic Summary       Row Name 11/14/24 1605       General Information    Admission Type inpatient    Arrived From emergency department    Referral Source admission list    Reason for Consult discharge planning    Preferred Language English       Contact Information    Permission Granted to Share Info With                    Functional Status       Row Name 11/14/24 1606       Functional Status    Usual Activity Tolerance good    Current Activity Tolerance moderate       Functional Status, IADL    Medications independent    Meal Preparation independent    Housekeeping independent    Laundry independent    Shopping independent       Mental Status    General Appearance WDL WDL       Mental Status Summary    Recent Changes in Mental Status/Cognitive Functioning no changes                          Velvet BILLY,RN Case Manager  [facility]  Phone: Desk- [phone] cell- [phone]

## 2024-11-14 NOTE — PLAN OF CARE
Goal Outcome Evaluation:   Pt rested throughout shift. VSS. Pt tolerating sips of water/ice chips. Pain medication given per MAR. All questions and concerns addressed.

## 2024-11-14 NOTE — PROGRESS NOTES
[facility] MEDICINE SERVICE  DAILY PROGRESS NOTE    NAME: Dianna Claros  : 1975  MRN: [id number]      LOS: 1 day     PROVIDER OF SERVICE: Remigio Galvan MD    Chief Complaint: Acute pancreatitis    Subjective:     Interval History:  History taken from: patient    Mild improvement in her symptoms.  Some vomiting this morning.        Review of Systems:   Review of Systems    Objective:     Vital Signs  Temp:  [97.3 °F (36.3 °C)-97.8 °F (36.6 °C)] 97.8 °F (36.6 °C)  Heart Rate:  [63-81] 74  Resp:  [10-20] 11  BP: ()/(40-84) 106/71   Body mass index is 34.63 kg/m².    Physical Exam  Physical Exam  Constitutional:       General: She is not in acute distress.     Appearance: She is ill-appearing.   Cardiovascular:      Rate and Rhythm: Normal rate and regular rhythm.   Pulmonary:      Effort: Pulmonary effort is normal.      Breath sounds: Normal breath sounds.   Abdominal:      Tenderness: There is abdominal tenderness.   Neurological:      Mental Status: She is alert.            Diagnostic Data    Results from last 7 days   Lab Units 24  0338 24  1844   WBC 10*3/mm3 6.71 8.98   HEMOGLOBIN g/dL 11.0* 13.1   HEMATOCRIT % 35.1 40.3   PLATELETS 10*3/mm3 181 221   GLUCOSE mg/dL 81 94   CREATININE mg/dL 0.82 0.83   BUN mg/dL 10 12   SODIUM mmol/L 142 144   POTASSIUM mmol/L 4.1 4.3   AST (SGOT) U/L  --  18   ALT (SGPT) U/L  --  17   ALK PHOS U/L  --  118*   BILIRUBIN mg/dL  --  0.3   ANION GAP mmol/L 8.4 10.8       CT Abdomen Pelvis With Contrast    Result Date: 2024  Impression: Acute interstitial edematous pancreatitis Electronically Signed: Bc Posadas  2024 8:57 PM EST  Workstation ID: [id number]       I reviewed the patient's new clinical results.    Assessment/Plan:     Active and Resolved Problems  Active Hospital Problems    Diagnosis  POA    **Acute pancreatitis [K85.90]  Yes      Resolved Hospital Problems   No resolved problems to display.         Acute on chronic  pancreatitis  -Lipase 1038  -CT of the abdomen and pelvis reviewed, acute edematous pancreatitis noted  -IV fluids ordered  -Analgesics and antiemetics as needed  -Advance diet to clear liquids today.  Will see how she does.  Hopefully increase further tomorrow.  .-Continue home pancrelipase, Pepcid, Welchol when eating    VTE Prophylaxis:  Mechanical VTE prophylaxis orders are present.             Disposition Planning:     Barriers to Discharge: Able to tolerate diet, symptoms  Anticipated Date of Discharge: 11/16/2024  Place of Discharge: Home likely      Time: 50 minutes     Code Status and Medical Interventions: CPR (Attempt to Resuscitate); Full Support   Ordered at: 11/13/24 0054     Code Status (Patient has no pulse and is not breathing):    CPR (Attempt to Resuscitate)     Medical Interventions (Patient has pulse or is breathing):    Full Support       Signature: Electronically signed by Remigio Galvan MD, 11/14/24, 13:47 EST.  [facility] Hospitalist Team

## 2024-11-15 LAB
ANION GAP SERPL CALCULATED.3IONS-SCNC: 7.9 MMOL/L (ref 5–15)
BASOPHILS # BLD AUTO: 0.01 10*3/MM3 (ref 0–0.2)
BASOPHILS NFR BLD AUTO: 0.2 % (ref 0–1.5)
BUN SERPL-MCNC: 7 MG/DL (ref 6–20)
BUN/CREAT SERPL: 8.4 (ref 7–25)
CALCIUM SPEC-SCNC: 8.7 MG/DL (ref 8.6–10.5)
CHLORIDE SERPL-SCNC: 104 MMOL/L (ref 98–107)
CO2 SERPL-SCNC: 28.1 MMOL/L (ref 22–29)
CREAT SERPL-MCNC: 0.83 MG/DL (ref 0.57–1)
DEPRECATED RDW RBC AUTO: 43.5 FL (ref 37–54)
EGFRCR SERPLBLD CKD-EPI 2021: 88.2 ML/MIN/1.73
EOSINOPHIL # BLD AUTO: 0.06 10*3/MM3 (ref 0–0.4)
EOSINOPHIL NFR BLD AUTO: 1 % (ref 0.3–6.2)
ERYTHROCYTE [DISTWIDTH] IN BLOOD BY AUTOMATED COUNT: 12.7 % (ref 12.3–15.4)
GLUCOSE SERPL-MCNC: 73 MG/DL (ref 65–99)
HCT VFR BLD AUTO: 32.8 % (ref 34–46.6)
HGB BLD-MCNC: 10.5 G/DL (ref 12–15.9)
IMM GRANULOCYTES # BLD AUTO: 0.02 10*3/MM3 (ref 0–0.05)
IMM GRANULOCYTES NFR BLD AUTO: 0.3 % (ref 0–0.5)
LYMPHOCYTES # BLD AUTO: 2.33 10*3/MM3 (ref 0.7–3.1)
LYMPHOCYTES NFR BLD AUTO: 39.2 % (ref 19.6–45.3)
MCH RBC QN AUTO: 29.7 PG (ref 26.6–33)
MCHC RBC AUTO-ENTMCNC: 32 G/DL (ref 31.5–35.7)
MCV RBC AUTO: 92.7 FL (ref 79–97)
MONOCYTES # BLD AUTO: 0.32 10*3/MM3 (ref 0.1–0.9)
MONOCYTES NFR BLD AUTO: 5.4 % (ref 5–12)
NEUTROPHILS NFR BLD AUTO: 3.21 10*3/MM3 (ref 1.7–7)
NEUTROPHILS NFR BLD AUTO: 53.9 % (ref 42.7–76)
NRBC BLD AUTO-RTO: 0 /100 WBC (ref 0–0.2)
PLATELET # BLD AUTO: 166 10*3/MM3 (ref 140–450)
PMV BLD AUTO: 10.8 FL (ref 6–12)
POTASSIUM SERPL-SCNC: 3.9 MMOL/L (ref 3.5–5.2)
RBC # BLD AUTO: 3.54 10*6/MM3 (ref 3.77–5.28)
SODIUM SERPL-SCNC: 140 MMOL/L (ref 136–145)
WBC NRBC COR # BLD AUTO: 5.95 10*3/MM3 (ref 3.4–10.8)

## 2024-11-15 PROCEDURE — 25810000003 LACTATED RINGERS PER 1000 ML

## 2024-11-15 PROCEDURE — 85025 COMPLETE CBC W/AUTO DIFF WBC: CPT

## 2024-11-15 PROCEDURE — 25010000002 HYDROMORPHONE 1 MG/ML SOLUTION

## 2024-11-15 PROCEDURE — 25010000002 ONDANSETRON PER 1 MG: Performed by: STUDENT IN AN ORGANIZED HEALTH CARE EDUCATION/TRAINING PROGRAM

## 2024-11-15 PROCEDURE — 80048 BASIC METABOLIC PNL TOTAL CA: CPT

## 2024-11-15 RX ADMIN — HYDROMORPHONE HYDROCHLORIDE 1 MG: 1 INJECTION, SOLUTION INTRAMUSCULAR; INTRAVENOUS; SUBCUTANEOUS at 11:06

## 2024-11-15 RX ADMIN — HYDROMORPHONE HYDROCHLORIDE 1 MG: 1 INJECTION, SOLUTION INTRAMUSCULAR; INTRAVENOUS; SUBCUTANEOUS at 14:24

## 2024-11-15 RX ADMIN — HYDROMORPHONE HYDROCHLORIDE 1 MG: 1 INJECTION, SOLUTION INTRAMUSCULAR; INTRAVENOUS; SUBCUTANEOUS at 04:48

## 2024-11-15 RX ADMIN — HYDROMORPHONE HYDROCHLORIDE 1 MG: 1 INJECTION, SOLUTION INTRAMUSCULAR; INTRAVENOUS; SUBCUTANEOUS at 20:16

## 2024-11-15 RX ADMIN — SODIUM CHLORIDE, SODIUM LACTATE, POTASSIUM CHLORIDE, CALCIUM CHLORIDE 125 ML/HR: 20; 30; 600; 310 INJECTION, SOLUTION INTRAVENOUS at 05:27

## 2024-11-15 RX ADMIN — SODIUM CHLORIDE, SODIUM LACTATE, POTASSIUM CHLORIDE, CALCIUM CHLORIDE 125 ML/HR: 20; 30; 600; 310 INJECTION, SOLUTION INTRAVENOUS at 23:45

## 2024-11-15 RX ADMIN — HYDROMORPHONE HYDROCHLORIDE 1 MG: 1 INJECTION, SOLUTION INTRAMUSCULAR; INTRAVENOUS; SUBCUTANEOUS at 17:43

## 2024-11-15 RX ADMIN — ACETAMINOPHEN 650 MG: 325 TABLET, FILM COATED ORAL at 04:47

## 2024-11-15 RX ADMIN — ONDANSETRON 4 MG: 2 INJECTION, SOLUTION INTRAMUSCULAR; INTRAVENOUS at 17:43

## 2024-11-15 RX ADMIN — Medication 10 ML: at 20:16

## 2024-11-15 RX ADMIN — HYDROMORPHONE HYDROCHLORIDE 1 MG: 1 INJECTION, SOLUTION INTRAMUSCULAR; INTRAVENOUS; SUBCUTANEOUS at 01:43

## 2024-11-15 RX ADMIN — HYDROMORPHONE HYDROCHLORIDE 1 MG: 1 INJECTION, SOLUTION INTRAMUSCULAR; INTRAVENOUS; SUBCUTANEOUS at 23:45

## 2024-11-15 RX ADMIN — Medication 10 ML: at 08:09

## 2024-11-15 RX ADMIN — HYDROMORPHONE HYDROCHLORIDE 1 MG: 1 INJECTION, SOLUTION INTRAMUSCULAR; INTRAVENOUS; SUBCUTANEOUS at 08:09

## 2024-11-15 NOTE — PAYOR COMM NOTE
"This is clincal for Natalia Pena.    AUTHORIZATION PENDING: [id number]   PLEASE FAX OR CALL DETERMINATION TO CONTACT BELOW:   THANK YOU.      Renetta Francisco RN, CCM  Utilization Nurse  [address] [address]   [phone]  Fx [phone]     Natalia Pena (48 y.o. Female)       Date of Birth   1975    Social Security Number       Address   [address] [place] [address]    Home Phone   [phone]    MRN   [id number]       Mu-ism   None    Marital Status                               Admission Date   11/13/24    Admission Type   Emergency    Admitting Provider   Remigio Galvan MD    Attending Provider   Dana Barrow MD    Department, Room/Bed   [facility] 2F, 2210/1       Discharge Date       Discharge Disposition       Discharge Destination                                 Attending Provider: Dana Barrow MD    Allergies: Roxicodone [Oxycodone Hcl], Toradol [Ketorolac Tromethamine], Gabapentin, Naproxen, Pregabalin, Morphine    Isolation: None   Infection: None   Code Status: CPR    Ht: 162.6 cm (64\")   Wt: 91.5 kg (201 lb 11.5 oz)    Admission Cmt: None   Principal Problem: Acute pancreatitis [K85.90]                   Active Insurance as of 11/13/2024       Primary Coverage       Payor Plan Insurance Group Employer/Plan Group    [employer] BLUE CROSS BLUE [employer]IELD PPO [id number]       Payor Plan Address Payor Plan Phone Number Payor Plan Fax Number Effective Dates    [address] [phone]  7/1/2024 - None Entered    [facility] [id number]         Subscriber Name Subscriber Birth Date Member ID       NATALIA PENA 1975 [id number]               Secondary Coverage       Payor Plan Insurance Group Employer/Plan Group    [employer]HE[place] MEDICAID [facility] -[facility]TH [id number]       Payor Plan Address Payor Plan Phone Number Payor Plan Fax Number Effective Dates    MAIL STOP:   " 2023 - None Entered    [address] [address]       [facility] [address]         Subscriber Name Subscriber Birth Date Member ID       NATALIA PENA 1975 [id number]                     Emergency Contacts        (Rel.) Home Phone Work Phone Mobile Phone    LANDEN LARA (Son) -- -- [phone]    TONI PENA (Spouse) [phone] -- --                 History & Physical        Shwetha Kaufman, JAMIL at 24 2159       Attestation signed by Leti Bell DO at 24 0045    Attending Attestation:    This visit was performed by an NP/PA. I reviewed all aspects of the MDM as documented otherwise stated by my note.                       [facility] Medicine Services  History & Physical    Patient Name: Natalia Pena  : 1975  MRN: [id number]  Primary Care Physician:  Anita Blanco MD  Date of admission: 2024  Date and Time of Service: 2024 at 2145      Assessment & Plan      Chief Complaint: abdominal pain, nausea, vomiting    Plan:    Acute Pancreatitis  -Lipase 1038  -CT of the abdomen and pelvis reviewed, acute edematous pancreatitis noted  -IV fluids ordered  -Analgesics and antiemetics as needed  -NPO, advance as tolerated  .-Continue home pancrelipase, Pepcid, Welchol when eating        Home medications for chronic conditions will be restarted as appropriate when verified by pharmacy      History of Present Illness     History of Present Illness: Natalia Pena is a 48 y.o. female with a previous medical history of chronic pancreatitis who presented to [facility] on 2024 with  complaints of abdominal pain, nausea and vomiting that started today.  She states that she was nauseous and vomiting until she stopped eating but she continues to have pain.    In the ED, Lipase was 1038. Otherwise, labs are unremarkable.  CT of the abdomen and pelvis showed acute, edematous pancreatitis.  UA is negative.  She is afebrile, all vitals  are stable.  Hospitalist was consulted for further management.    12 point ROS reviewed and negative except as mentioned above    Objective      Vitals:   Temp:  [97.7 °F (36.5 °C)-97.8 °F (36.6 °C)] 97.7 °F (36.5 °C)  Heart Rate:  [66-81] 70  Resp:  [10-20] 10  BP: ()/(40-84) 103/46  Body mass index is 34.63 kg/m².    Physical Exam  Vitals and nursing note reviewed.   Constitutional:       Appearance: Normal appearance.   HENT:      Mouth/Throat:      Mouth: Mucous membranes are moist.   Cardiovascular:      Rate and Rhythm: Normal rate and regular rhythm.   Pulmonary:      Effort: Pulmonary effort is normal.      Breath sounds: Normal breath sounds.   Abdominal:      General: Bowel sounds are normal.      Palpations: Abdomen is soft.      Tenderness: There is abdominal tenderness in the epigastric area.   Musculoskeletal:         General: Normal range of motion.   Skin:     General: Skin is warm and dry.   Neurological:      General: No focal deficit present.      Mental Status: She is alert and oriented to person, place, and time. Mental status is at baseline.   Psychiatric:         Mood and Affect: Mood normal.         Behavior: Behavior normal.            Personal History     This is a 48 y.o. female with:    Past Medical History:   Diagnosis Date    Endometriosis     Fibromyalgia     Hyperlipidemia     Hypertension     Injury of back        Past Surgical History:   Procedure Laterality Date    ABDOMINAL SURGERY       SECTION      CHOLECYSTECTOMY WITH INTRAOPERATIVE CHOLANGIOGRAM N/A 2022    Procedure: CHOLECYSTECTOMY LAPAROSCOPIC INTRAOPERATIVE CHOLANGIOGRAM;  Surgeon: Robert Dale MD;  Location: [facility] MAIN OR;  Service: General;  Laterality: N/A;    COLONOSCOPY N/A 3/7/2023    Procedure: COLONOSCOPY with ileum and large intestine random biopsies R/O microscopic colitis;  Surgeon: NAVIN Schneider MD;  Location: [facility] ENDOSCOPY;  Service: Gastroenterology;  Laterality: N/A;   hemorrhoids    ENDOSCOPY N/A 5/30/2022    Procedure: ESOPHAGOGASTRODUODENOSCOPY WITH BIOPSY;  Surgeon: NAVIN Schneider MD;  Location: [facility] ENDOSCOPY;  Service: Gastroenterology;  Laterality: N/A;    HYSTERECTOMY      UPPER ENDOSCOPIC ULTRASOUND W/ FNA N/A 12/4/2023    Procedure: ESOPHAGOGASTRODUODENOSCOPY WITH ENDOSCOPIC ULTRASOUND;  Surgeon: Malik King MD;  Location: [facility] ENDOSCOPY;  Service: Gastroenterology;  Laterality: N/A;  POST: CHRONIC PANCREATITIS       Active and Resolved Problems  Active Hospital Problems    Diagnosis  POA    **Acute pancreatitis [K85.90]  Yes      Resolved Hospital Problems   No resolved problems to display.       Family History: family history includes Cancer in her mother; Heart disease in her father. Otherwise pertinent FHx was reviewed and not pertinent to current issue.    Social History:  reports that she has been smoking cigarettes. She started smoking about 34 years ago. She has a 34.4 pack-year smoking history. She has never used smokeless tobacco. She reports that she does not drink alcohol and does not use drugs.    Home Medications:  Prior to Admission Medications       Prescriptions Last Dose Informant Patient Reported? Taking?    acetaminophen (TYLENOL) 325 MG tablet   Yes No    Take 2 tablets by mouth Every 6 (Six) Hours As Needed for Mild Pain.    colesevelam (WELCHOL) 625 MG tablet   Yes No    Take 1 tablet by mouth 2 (Two) Times a Day With Meals.    famotidine (PEPCID) 20 MG tablet   Yes No    Take 1 tablet by mouth Every Morning.    famotidine (PEPCID) 40 MG tablet   Yes No    Take 1 tablet by mouth Every Night.    HYDROcodone-acetaminophen (NORCO) 5-325 MG per tablet   No No    Take 1 tablet by mouth Every 6 (Six) Hours As Needed for Severe Pain.    hyoscyamine (LEVSIN) 0.125 MG SL tablet   Yes No    Place 1 tablet under the tongue Every 6 (Six) Hours As Needed for Cramping.    nortriptyline (PAMELOR) 10 MG capsule   Yes No    Take 1 capsule by mouth Every  Night.    ondansetron (ZOFRAN) 4 MG tablet   No No    Take 1 tablet by mouth Every 8 (Eight) Hours As Needed for Nausea or Vomiting.    ondansetron ODT (ZOFRAN-ODT) 4 MG disintegrating tablet   Yes No    Place 1 tablet on the tongue Every 8 (Eight) Hours As Needed for Nausea or Vomiting.    Pancrelipase, Lip-Prot-Amyl, (Zenpep) 20750-84541 units capsule delayed-release particles   Yes No    Take 2 capsules by mouth 3 (Three) Times a Day With Meals.    prochlorperazine (COMPAZINE) 10 MG tablet   No No    Take 1 tablet by mouth Every 6 (Six) Hours As Needed for Nausea or Vomiting.              Allergies:  Allergies   Allergen Reactions    Roxicodone [Oxycodone Hcl] Itching and Nausea Only    Toradol [Ketorolac Tromethamine] Itching and Irritability    Gabapentin Nausea And Vomiting    Naproxen Nausea And Vomiting    Pregabalin Hives    Morphine Itching           VTE Prophylaxis:  Mechanical VTE prophylaxis orders are present.        CODE STATUS:    Code Status (Patient has no pulse and is not breathing): CPR (Attempt to Resuscitate)  Medical Interventions (Patient has pulse or is breathing): Full Support        Admission Status:  I believe this patient meets inpatient status.    I discussed the patient's findings and my recommendations with patient.    Signature:     This document has been electronically signed by Swhetha Kaufman DNP, APRN, AGACNP-BC on November 13, 2024 23:35 [facility]    Electronically signed by Leti Bell DO at 11/14/24 0045          Emergency Department Notes        Estephania Clayton APRN at 11/13/24 0755          Subjective   Chief Complaint   Patient presents with    Abdominal Pain     Abd pain for last 4 hours pt stats she feels like her pancreas is swollen  Pt  has nausea and vomiting.         History of Present Illness  Patient is a 48-year-old female who has a history of chronic pancreatitis reports that she feels like her abdomen is swollen and is having  nausea and vomiting.  States it feels exactly like her last episode of pancreatitis.  Review of Systems  HPI  Past Medical History:   Diagnosis Date    Endometriosis     Fibromyalgia     Hyperlipidemia     Hypertension     Injury of back        Allergies   Allergen Reactions    Roxicodone [Oxycodone Hcl] Itching and Nausea Only    Toradol [Ketorolac Tromethamine] Itching and Irritability    Gabapentin Nausea And Vomiting    Naproxen Nausea And Vomiting    Pregabalin Hives    Morphine Itching       Past Surgical History:   Procedure Laterality Date    ABDOMINAL SURGERY       SECTION      CHOLECYSTECTOMY WITH INTRAOPERATIVE CHOLANGIOGRAM N/A 2022    Procedure: CHOLECYSTECTOMY LAPAROSCOPIC INTRAOPERATIVE CHOLANGIOGRAM;  Surgeon: Robert Dale MD;  Location: [facility] MAIN OR;  Service: General;  Laterality: N/A;    COLONOSCOPY N/A 3/7/2023    Procedure: COLONOSCOPY with ileum and large intestine random biopsies R/O microscopic colitis;  Surgeon: NAVIN Schneider MD;  Location: [facility] ENDOSCOPY;  Service: Gastroenterology;  Laterality: N/A;  hemorrhoids    ENDOSCOPY N/A 2022    Procedure: ESOPHAGOGASTRODUODENOSCOPY WITH BIOPSY;  Surgeon: NAVIN Schneider MD;  Location: [facility] ENDOSCOPY;  Service: Gastroenterology;  Laterality: N/A;    HYSTERECTOMY      UPPER ENDOSCOPIC ULTRASOUND W/ FNA N/A 2023    Procedure: ESOPHAGOGASTRODUODENOSCOPY WITH ENDOSCOPIC ULTRASOUND;  Surgeon: Malik King MD;  Location: [facility] ENDOSCOPY;  Service: Gastroenterology;  Laterality: N/A;  POST: CHRONIC PANCREATITIS       Family History   Problem Relation Age of Onset    Cancer Mother     Heart disease Father        Social History     Socioeconomic History    Marital status:    Tobacco Use    Smoking status: Every Day     Current packs/day: 1.00     Average packs/day: 1 pack/day for 34.4 years (34.4 ttl pk-yrs)     Types: Cigarettes     Start date: 1990    Smokeless tobacco: Never   Vaping Use    Vaping  "status: Never Used   Substance and Sexual Activity    Alcohol use: Never    Drug use: Never    Sexual activity: Defer           Objective   Physical Exam  Vitals and nursing note reviewed.   Constitutional:       General: She is not in acute distress.     Appearance: She is well-developed. She is not ill-appearing, toxic-appearing or diaphoretic.   HENT:      Head: Normocephalic and atraumatic.      Mouth/Throat:      Mouth: Mucous membranes are moist.      Pharynx: Oropharynx is clear.   Eyes:      Extraocular Movements: Extraocular movements intact.      Pupils: Pupils are equal, round, and reactive to light.   Cardiovascular:      Rate and Rhythm: Normal rate and regular rhythm.      Heart sounds: Normal heart sounds.   Pulmonary:      Effort: Pulmonary effort is normal.      Breath sounds: Normal breath sounds.   Abdominal:      General: Bowel sounds are normal.      Palpations: Abdomen is soft.      Tenderness: There is abdominal tenderness in the right upper quadrant, epigastric area, periumbilical area and left upper quadrant. There is no right CVA tenderness, left CVA tenderness, guarding or rebound. Negative signs include Obregon's sign and McBurney's sign.      Hernia: No hernia is present.   Skin:     General: Skin is warm and dry.      Capillary Refill: Capillary refill takes less than 2 seconds.   Neurological:      General: No focal deficit present.      Mental Status: She is alert.   Psychiatric:         Mood and Affect: Mood normal.         Behavior: Behavior normal.         Procedures          ED Course  ED Course as of 11/14/24 0251 Wed Nov 13, 2024 1951 Patient is marked ready for CT [DT]      ED Course User Index  [DT] Estephania Clayton, APRN                    No data recorded                   /58 (BP Location: Right arm, Patient Position: Lying)   Pulse 71   Temp 97.8 °F (36.6 °C) (Oral)   Resp 11   Ht 162.6 cm (64\")   Wt 91.5 kg (201 lb 11.5 oz)   SpO2 92%   BMI 34.63 kg/m² "   Labs Reviewed   COMPREHENSIVE METABOLIC PANEL - Abnormal; Notable for the following components:       Result Value    Alkaline Phosphatase 118 (*)     All other components within normal limits    Narrative:     GFR Normal >60  Chronic Kidney Disease <60  Kidney Failure <15     LIPASE - Abnormal; Notable for the following components:    Lipase 1,038 (*)     All other components within normal limits   CBC WITH AUTO DIFFERENTIAL - Abnormal; Notable for the following components:    Lymphocytes, Absolute 3.63 (*)     All other components within normal limits   URINALYSIS W/ MICROSCOPIC IF INDICATED (NO CULTURE) - Normal    Narrative:     Urine microscopic not indicated.   RAINBOW DRAW    Narrative:     The following orders were created for panel order [facility] Draw.  Procedure                               Abnormality         Status                     ---------                               -----------         ------                     Green Top (Gel)[[id number]]                                  Final result               Lavender Top[[id number]]                                     Final result               Gold Top - SST[[id number]]                                   Final result               Light Blue Top[[id number]]                                   Final result                 Please view results for these tests on the individual orders.   BASIC METABOLIC PANEL   CBC WITH AUTO DIFFERENTIAL   CBC AND DIFFERENTIAL    Narrative:     The following orders were created for panel order CBC & Differential.  Procedure                               Abnormality         Status                     ---------                               -----------         ------                     CBC Auto Differential[[id number]]        Abnormal            Final result                 Please view results for these tests on the individual orders.   GREEN TOP   LAVENDER TOP   GOLD TOP - SST   LIGHT BLUE TOP   CBC AND DIFFERENTIAL    Narrative:      The following orders were created for panel order CBC & Differential.  Procedure                               Abnormality         Status                     ---------                               -----------         ------                     CBC Auto Differential[[id number]]                                                         Please view results for these tests on the individual orders.     .dmeds  CT Abdomen Pelvis With Contrast    Result Date: 11/13/2024  Impression: Acute interstitial edematous pancreatitis Electronically Signed: Bc Posadas  11/13/2024 8:57 PM EST  Workstation ID: [id number]             Medical Decision Making  Patient presents to the ED for the above complaint, underwent the above exam and workup.    Differential diagnosis considered: Pancreatitis, gastritis, gastroenteritis, constipation, bowel perforation, small bowel obstruction    Overall presentation is consistent with pancreatitis.      Patient was treated with the following while in the ED:  Medications   HYDROmorphone (DILAUDID) injection 1 mg (1 mg Intravenous Given 11/14/24 0109)   lactated ringers infusion (125 mL/hr Intravenous New Bag 11/13/24 2149)   sodium chloride 0.9 % flush 10 mL (10 mL Intravenous Not Given 11/13/24 2215)   sodium chloride 0.9 % flush 10 mL (has no administration in time range)   sodium chloride 0.9 % infusion 40 mL (has no administration in time range)   acetaminophen (TYLENOL) tablet 650 mg (has no administration in time range)     Or   acetaminophen (TYLENOL) 160 MG/5ML oral solution 650 mg (has no administration in time range)     Or   acetaminophen (TYLENOL) suppository 650 mg (has no administration in time range)   sennosides-docusate (PERICOLACE) 8.6-50 MG per tablet 2 tablet (has no administration in time range)     And   polyethylene glycol (MIRALAX) packet 17 g (has no administration in time range)     And   bisacodyl (DULCOLAX) EC tablet 5 mg (has no administration in time range)     And    bisacodyl (DULCOLAX) suppository 10 mg (has no administration in time range)   ondansetron (ZOFRAN) injection 4 mg (4 mg Intravenous Given 11/14/24 0109)   melatonin tablet 5 mg (has no administration in time range)   HYDROmorphone (DILAUDID) injection 0.5 mg (0.5 mg Intravenous Given 11/13/24 1915)   ondansetron (ZOFRAN) injection 4 mg (4 mg Intravenous Given 11/13/24 1915)   sodium chloride 0.9 % bolus 1,000 mL (0 mL Intravenous Stopped 11/13/24 2149)   iopamidol (ISOVUE-370) 76 % injection 100 mL (100 mL Intravenous Given 11/13/24 2036)   HYDROmorphone (DILAUDID) injection 0.5 mg (0.5 mg Intravenous Given 11/13/24 2050)     Exam as above discussed these results with patient and desire to admit patient for pain management and necessary treatment.  Patient is agreeable to this plan.    Discussed case with JAMIL Tadeo, who agreed to admit patient.      Final diagnoses:   Epigastric pain   Nausea and vomiting, unspecified vomiting type   Chronic pancreatitis, unspecified pancreatitis type       ED Disposition  ED Disposition       ED Disposition   Decision to Admit    Condition   --    Comment   Level of Care: Med/Surg [1]   Diagnosis: Acute pancreatitis [577.0.ICD-9-CM]   Certification: I Certify That Inpatient Hospital Services Are Medically Necessary For Greater Than 2 Midnights                 No follow-up provider specified.       Medication List      No changes were made to your prescriptions during this visit.            Estephania Clayton APRN  11/14/24 0251      Electronically signed by Estephania Clayton APRN at 11/14/24 0251       Tiff Uribe LPN at 11/13/24 1846          Pt c/o epigastric pain that started today.  Pt sts she has chronic pancreatitis.      Electronically signed by Tiff Uribe LPN at 11/13/24 1847       Operative/Procedure Notes (last 48 hours)  Notes from 11/13/24 1106 through 11/15/24 1106   No notes of this type exist for this encounter.          Physician Progress Notes  (last 48 hours)        Remigio Galvan MD at 24 1347              [facility] MEDICINE SERVICE  DAILY PROGRESS NOTE    NAME: Dianna Claros  : 1975  MRN: [id number]      LOS: 1 day     PROVIDER OF SERVICE: Remigio Galvan MD    Chief Complaint: Acute pancreatitis    Subjective:     Interval History:  History taken from: patient    Mild improvement in her symptoms.  Some vomiting this morning.        Review of Systems:   Review of Systems    Objective:     Vital Signs  Temp:  [97.3 °F (36.3 °C)-97.8 °F (36.6 °C)] 97.8 °F (36.6 °C)  Heart Rate:  [63-81] 74  Resp:  [10-20] 11  BP: ()/(40-84) 106/71   Body mass index is 34.63 kg/m².    Physical Exam  Physical Exam  Constitutional:       General: She is not in acute distress.     Appearance: She is ill-appearing.   Cardiovascular:      Rate and Rhythm: Normal rate and regular rhythm.   Pulmonary:      Effort: Pulmonary effort is normal.      Breath sounds: Normal breath sounds.   Abdominal:      Tenderness: There is abdominal tenderness.   Neurological:      Mental Status: She is alert.            Diagnostic Data    Results from last 7 days   Lab Units 24  0338 24  1844   WBC 10*3/mm3 6.71 8.98   HEMOGLOBIN g/dL 11.0* 13.1   HEMATOCRIT % 35.1 40.3   PLATELETS 10*3/mm3 181 221   GLUCOSE mg/dL 81 94   CREATININE mg/dL 0.82 0.83   BUN mg/dL 10 12   SODIUM mmol/L 142 144   POTASSIUM mmol/L 4.1 4.3   AST (SGOT) U/L  --  18   ALT (SGPT) U/L  --  17   ALK PHOS U/L  --  118*   BILIRUBIN mg/dL  --  0.3   ANION GAP mmol/L 8.4 10.8       CT Abdomen Pelvis With Contrast    Result Date: 2024  Impression: Acute interstitial edematous pancreatitis Electronically Signed: Bc Posadas  2024 8:57 PM EST  Workstation ID: [id number]       I reviewed the patient's new clinical results.    Assessment/Plan:     Active and Resolved Problems  Active Hospital Problems    Diagnosis  POA    **Acute pancreatitis [K85.90]  Yes      Resolved Hospital  Problems   No resolved problems to display.         Acute on chronic pancreatitis  -Lipase 1038  -CT of the abdomen and pelvis reviewed, acute edematous pancreatitis noted  -IV fluids ordered  -Analgesics and antiemetics as needed  -Advance diet to clear liquids today.  Will see how she does.  Hopefully increase further tomorrow.  .-Continue home pancrelipase, Pepcid, Welchol when eating    VTE Prophylaxis:  Mechanical VTE prophylaxis orders are present.             Disposition Planning:     Barriers to Discharge: Able to tolerate diet, symptoms  Anticipated Date of Discharge: 11/16/2024  Place of Discharge: Home likely      Time: 50 minutes     Code Status and Medical Interventions: CPR (Attempt to Resuscitate); Full Support   Ordered at: 11/13/24 2214     Code Status (Patient has no pulse and is not breathing):    CPR (Attempt to Resuscitate)     Medical Interventions (Patient has pulse or is breathing):    Full Support       Signature: Electronically signed by Remigio Galvan MD, 11/14/24, 13:47 EST.  [facility]ist Team      Electronically signed by Remigio Galvan MD at 11/14/24 1349       Consult Notes (last 48 hours)  Notes from 11/13/24 1106 through 11/15/24 1106   No notes of this type exist for this encounter.

## 2024-11-15 NOTE — PLAN OF CARE
Problem: Adult Inpatient Plan of Care  Goal: Plan of Care Review  Outcome: Progressing  Flowsheets (Taken 11/15/2024 0108)  Progress: improving  Plan of Care Reviewed With: patient  Goal: Absence of Hospital-Acquired Illness or Injury  Intervention: Identify and Manage Fall Risk  Recent Flowsheet Documentation  Taken 11/15/2024 0003 by Elen Terry RN  Safety Promotion/Fall Prevention:   safety round/check completed   room organization consistent   nonskid shoes/slippers when out of bed  Taken 11/14/2024 2214 by Elen Terry RN  Safety Promotion/Fall Prevention:   nonskid shoes/slippers when out of bed   room organization consistent   safety round/check completed  Taken 11/14/2024 2020 by Elen Terry RN  Safety Promotion/Fall Prevention:   clutter free environment maintained   nonskid shoes/slippers when out of bed   lighting adjusted   room organization consistent   safety round/check completed  Intervention: Prevent Skin Injury  Recent Flowsheet Documentation  Taken 11/14/2024 2020 by Elen Terry RN  Body Position: position changed independently  Intervention: Prevent and Manage VTE (Venous Thromboembolism) Risk  Recent Flowsheet Documentation  Taken 11/14/2024 2020 by Elen Terry RN  VTE Prevention/Management: patient refused intervention  Intervention: Prevent Infection  Recent Flowsheet Documentation  Taken 11/15/2024 0003 by Elen Terry RN  Infection Prevention:   hand hygiene promoted   equipment surfaces disinfected   rest/sleep promoted   single patient room provided  Taken 11/14/2024 2214 by Elen Terry RN  Infection Prevention:   rest/sleep promoted   hand hygiene promoted   equipment surfaces disinfected  Taken 11/14/2024 2020 by Elen Terry RN  Infection Prevention:   equipment surfaces disinfected   hand hygiene promoted   rest/sleep promoted   single patient room provided  Goal: Optimal Comfort and Wellbeing  Intervention: Monitor Pain and Promote  Comfort  Recent Flowsheet Documentation  Taken 11/14/2024 2020 by Elen Terry, RN  Pain Management Interventions: (patient asked to shower)   pillow support provided   other (see comments)  Intervention: Provide Person-Centered Care  Recent Flowsheet Documentation  Taken 11/14/2024 2020 by Elen Terry, RN  Trust Relationship/Rapport: care explained   Goal Outcome Evaluation:  Plan of Care Reviewed With: patient        Progress: improving

## 2024-11-15 NOTE — PLAN OF CARE
Problem: Adult Inpatient Plan of Care  Goal: Plan of Care Review  11/15/2024 0110 by Elen Terry RN  Outcome: Progressing  Flowsheets (Taken 11/15/2024 0110)  Progress: improving  11/15/2024 0109 by lEen Terry RN  Outcome: Progressing  Flowsheets (Taken 11/15/2024 0109)  Progress: improving  Plan of Care Reviewed With: patient  11/15/2024 0108 by Elen Terry RN  Outcome: Progressing  Flowsheets (Taken 11/15/2024 0108)  Progress: improving  Plan of Care Reviewed With: patient  Goal: Patient-Specific Goal (Individualized)  Outcome: Progressing  Goal: Absence of Hospital-Acquired Illness or Injury  Outcome: Progressing  Intervention: Identify and Manage Fall Risk  Recent Flowsheet Documentation  Taken 11/15/2024 0003 by Elen Terry RN  Safety Promotion/Fall Prevention:   safety round/check completed   room organization consistent   nonskid shoes/slippers when out of bed  Taken 11/14/2024 2214 by Elen Terry RN  Safety Promotion/Fall Prevention:   nonskid shoes/slippers when out of bed   room organization consistent   safety round/check completed  Taken 11/14/2024 2020 by Elen Terry RN  Safety Promotion/Fall Prevention:   clutter free environment maintained   nonskid shoes/slippers when out of bed   lighting adjusted   room organization consistent   safety round/check completed  Intervention: Prevent Skin Injury  Recent Flowsheet Documentation  Taken 11/14/2024 2020 by Elen Terry RN  Body Position: position changed independently  Intervention: Prevent and Manage VTE (Venous Thromboembolism) Risk  Recent Flowsheet Documentation  Taken 11/14/2024 2020 by Elen Terry RN  VTE Prevention/Management: patient refused intervention  Intervention: Prevent Infection  Recent Flowsheet Documentation  Taken 11/15/2024 0003 by Elen Terry RN  Infection Prevention:   hand hygiene promoted   equipment surfaces disinfected   rest/sleep promoted   single patient room  provided  Taken 11/14/2024 2214 by Elen Terry, RN  Infection Prevention:   rest/sleep promoted   hand hygiene promoted   equipment surfaces disinfected  Taken 11/14/2024 2020 by Elen Terry RN  Infection Prevention:   equipment surfaces disinfected   hand hygiene promoted   rest/sleep promoted   single patient room provided  Goal: Optimal Comfort and Wellbeing  Outcome: Progressing  Intervention: Monitor Pain and Promote Comfort  Recent Flowsheet Documentation  Taken 11/14/2024 2020 by Elen Terry RN  Pain Management Interventions: (patient asked to shower)   pillow support provided   other (see comments)  Intervention: Provide Person-Centered Care  Recent Flowsheet Documentation  Taken 11/14/2024 2020 by Elen Terry, RN  Trust Relationship/Rapport: care explained  Goal: Readiness for Transition of Care  Outcome: Progressing   Goal Outcome Evaluation:  Plan of Care Reviewed With: patient      Patient denies complaints at this time  Progress: improving

## 2024-11-15 NOTE — CASE MANAGEMENT/SOCIAL WORK
Continued Stay Note   [facility]     Patient Name: Dianna Claros  MRN: [id number]  Today's Date: 11/15/2024    Admit Date: 11/13/2024    Plan: Return home with spouse and adult children   Discharge Plan       Row Name 11/15/24 5137       Plan    Plan Comments DC Barriers: Clear liquid diet, IVFs, continued abdominal pain               Keila Hitchcock RN      [facility]  Office: [phone]  Cell: [phone]  Fax # [phone]

## 2024-11-15 NOTE — PLAN OF CARE
Problem: Adult Inpatient Plan of Care  Goal: Plan of Care Review  11/15/2024 0110 by Elen Terry RN  Outcome: Progressing  Flowsheets (Taken 11/15/2024 0110)  Progress: improving  11/15/2024 0109 by Elen Terry RN  Outcome: Progressing  Flowsheets (Taken 11/15/2024 0109)  Progress: improving  Plan of Care Reviewed With: patient  11/15/2024 0108 by Elen Terry RN  Outcome: Progressing  Flowsheets (Taken 11/15/2024 0108)  Progress: improving  Plan of Care Reviewed With: patient  Goal: Patient-Specific Goal (Individualized)  Outcome: Progressing  Goal: Absence of Hospital-Acquired Illness or Injury  Outcome: Progressing  Intervention: Identify and Manage Fall Risk  Recent Flowsheet Documentation  Taken 11/15/2024 0003 by Elen Terry RN  Safety Promotion/Fall Prevention:   safety round/check completed   room organization consistent   nonskid shoes/slippers when out of bed  Taken 11/14/2024 2214 by Elen Terry RN  Safety Promotion/Fall Prevention:   nonskid shoes/slippers when out of bed   room organization consistent   safety round/check completed  Taken 11/14/2024 2020 by Elen Terry RN  Safety Promotion/Fall Prevention:   clutter free environment maintained   nonskid shoes/slippers when out of bed   lighting adjusted   room organization consistent   safety round/check completed  Intervention: Prevent Skin Injury  Recent Flowsheet Documentation  Taken 11/14/2024 2020 by Elen Terry RN  Body Position: position changed independently  Intervention: Prevent and Manage VTE (Venous Thromboembolism) Risk  Recent Flowsheet Documentation  Taken 11/14/2024 2020 by Elen Terry RN  VTE Prevention/Management: patient refused intervention  Intervention: Prevent Infection  Recent Flowsheet Documentation  Taken 11/15/2024 0003 by Elen Terry RN  Infection Prevention:   hand hygiene promoted   equipment surfaces disinfected   rest/sleep promoted   single patient room  provided  Taken 11/14/2024 2214 by Elen Terry, RN  Infection Prevention:   rest/sleep promoted   hand hygiene promoted   equipment surfaces disinfected  Taken 11/14/2024 2020 by Elen Terry RN  Infection Prevention:   equipment surfaces disinfected   hand hygiene promoted   rest/sleep promoted   single patient room provided  Goal: Optimal Comfort and Wellbeing  Outcome: Progressing  Intervention: Monitor Pain and Promote Comfort  Recent Flowsheet Documentation  Taken 11/14/2024 2020 by Elen Terry RN  Pain Management Interventions: (patient asked to shower)   pillow support provided   other (see comments)  Intervention: Provide Person-Centered Care  Recent Flowsheet Documentation  Taken 11/14/2024 2020 by Elen Terry, RN  Trust Relationship/Rapport: care explained  Goal: Readiness for Transition of Care  Outcome: Progressing   Goal Outcome Evaluation:  Plan of Care Reviewed With: patient        Progress: improving

## 2024-11-15 NOTE — PLAN OF CARE
Problem: Adult Inpatient Plan of Care  Goal: Plan of Care Review  11/15/2024 0111 by Elen Terry RN  Outcome: Adequate for Care Transition  11/15/2024 0110 by Elen Terry RN  Outcome: Progressing  Flowsheets (Taken 11/15/2024 0110)  Progress: improving  11/15/2024 0109 by Elen Terry RN  Outcome: Progressing  Flowsheets (Taken 11/15/2024 0109)  Progress: improving  Plan of Care Reviewed With: patient  11/15/2024 0108 by Elen Terry RN  Outcome: Progressing  Flowsheets (Taken 11/15/2024 0108)  Progress: improving  Plan of Care Reviewed With: patient  Goal: Patient-Specific Goal (Individualized)  11/15/2024 0111 by Elen Terry RN  Outcome: Adequate for Care Transition  11/15/2024 0110 by Elen Terry RN  Outcome: Progressing  Goal: Absence of Hospital-Acquired Illness or Injury  11/15/2024 0111 by Elen Terry RN  Outcome: Adequate for Care Transition  11/15/2024 0110 by Elen Terry RN  Outcome: Progressing  Intervention: Identify and Manage Fall Risk  Recent Flowsheet Documentation  Taken 11/15/2024 0003 by Elen Terry RN  Safety Promotion/Fall Prevention:   safety round/check completed   room organization consistent   nonskid shoes/slippers when out of bed  Taken 11/14/2024 2214 by Elen Terry RN  Safety Promotion/Fall Prevention:   nonskid shoes/slippers when out of bed   room organization consistent   safety round/check completed  Taken 11/14/2024 2020 by Elen Terry RN  Safety Promotion/Fall Prevention:   clutter free environment maintained   nonskid shoes/slippers when out of bed   lighting adjusted   room organization consistent   safety round/check completed  Intervention: Prevent Skin Injury  Recent Flowsheet Documentation  Taken 11/14/2024 2020 by Elen Terry RN  Body Position: position changed independently  Intervention: Prevent and Manage VTE (Venous Thromboembolism) Risk  Recent Flowsheet Documentation  Taken 11/14/2024 2020 by  Mara, Elen, RN  VTE Prevention/Management: patient refused intervention  Intervention: Prevent Infection  Recent Flowsheet Documentation  Taken 11/15/2024 0003 by Elen Terry RN  Infection Prevention:   hand hygiene promoted   equipment surfaces disinfected   rest/sleep promoted   single patient room provided  Taken 11/14/2024 2214 by Elen Terry RN  Infection Prevention:   rest/sleep promoted   hand hygiene promoted   equipment surfaces disinfected  Taken 11/14/2024 2020 by Elen Terry RN  Infection Prevention:   equipment surfaces disinfected   hand hygiene promoted   rest/sleep promoted   single patient room provided  Goal: Optimal Comfort and Wellbeing  11/15/2024 0111 by Elen Terry RN  Outcome: Adequate for Care Transition  11/15/2024 0110 by lEen Terry RN  Outcome: Progressing  Intervention: Monitor Pain and Promote Comfort  Recent Flowsheet Documentation  Taken 11/14/2024 2020 by Elen Terry RN  Pain Management Interventions: (patient asked to shower)   pillow support provided   other (see comments)  Intervention: Provide Person-Centered Care  Recent Flowsheet Documentation  Taken 11/14/2024 2020 by Elen Terry RN  Trust Relationship/Rapport: care explained  Goal: Readiness for Transition of Care  11/15/2024 0111 by Elen Terry RN  Outcome: Adequate for Care Transition  11/15/2024 0110 by Elen Terry RN  Outcome: Progressing   Goal Outcome Evaluation:  Plan of Care Reviewed With: patient        Progress: improving

## 2024-11-15 NOTE — PROGRESS NOTES
[facility] MEDICINE SERVICE  DAILY PROGRESS NOTE    NAME: Dianna Claros  : 1975  MRN: [id number]      LOS: 2 days     PROVIDER OF SERVICE: Dana Barrow MD    Chief Complaint: Acute pancreatitis    Subjective:     Interval History:  History taken from: patient    Patient continues to have abdominal pain although improving. Tolerated clear liquids. No fever/chills. Has had work up previously for chronic pancreatitis, cause unclear. No recent alcohol use, no changes in medications noted.        Review of Systems:   Review of Systems   Constitutional:  Negative for chills and fever.   HENT:  Negative for congestion, ear discharge, ear pain, sinus pain and sore throat.    Respiratory:  Negative for apnea, cough, chest tightness, shortness of breath and wheezing.    Cardiovascular:  Negative for chest pain and palpitations.   Gastrointestinal:  Positive for abdominal pain and nausea. Negative for blood in stool, constipation, diarrhea and vomiting.   Endocrine: Negative for cold intolerance and heat intolerance.   Genitourinary:  Negative for dysuria, flank pain, hematuria and urgency.   Musculoskeletal:  Negative for arthralgias, back pain, joint swelling, myalgias and neck pain.   Skin: Negative.    Neurological: Negative.    Hematological:  Does not bruise/bleed easily.   Psychiatric/Behavioral:  Negative for agitation, confusion, hallucinations, sleep disturbance and suicidal ideas. The patient is not nervous/anxious.        Objective:     Vital Signs  Temp:  [97.6 °F (36.4 °C)-98.3 °F (36.8 °C)] 98 °F (36.7 °C)  Heart Rate:  [61-85] 62  Resp:  [11-16] 11  BP: (108-126)/(52-83) 108/60   Body mass index is 34.63 kg/m².    Physical Exam  Physical Exam  Constitutional:       General: She is not in acute distress.     Appearance: Normal appearance.   HENT:      Head: Normocephalic and atraumatic.      Nose: Nose normal.      Mouth/Throat:      Mouth: Mucous membranes are moist.   Eyes:       Extraocular Movements: Extraocular movements intact.      Conjunctiva/sclera: Conjunctivae normal.   Cardiovascular:      Rate and Rhythm: Normal rate and regular rhythm.   Pulmonary:      Effort: Pulmonary effort is normal.      Breath sounds: Normal breath sounds.   Abdominal:      General: Abdomen is flat. Bowel sounds are normal.      Palpations: Abdomen is soft.      Tenderness: There is abdominal tenderness. There is no guarding or rebound.   Musculoskeletal:         General: Normal range of motion.      Cervical back: Normal range of motion and neck supple.   Skin:     General: Skin is warm and dry.   Neurological:      General: No focal deficit present.      Mental Status: She is alert.   Psychiatric:         Mood and Affect: Mood normal.            Diagnostic Data    Results from last 7 days   Lab Units 11/15/24  0431 11/14/24  0338 11/13/24  1844   WBC 10*3/mm3 5.95   < > 8.98   HEMOGLOBIN g/dL 10.5*   < > 13.1   HEMATOCRIT % 32.8*   < > 40.3   PLATELETS 10*3/mm3 166   < > 221   GLUCOSE mg/dL 73   < > 94   CREATININE mg/dL 0.83   < > 0.83   BUN mg/dL 7   < > 12   SODIUM mmol/L 140   < > 144   POTASSIUM mmol/L 3.9   < > 4.3   AST (SGOT) U/L  --   --  18   ALT (SGPT) U/L  --   --  17   ALK PHOS U/L  --   --  118*   BILIRUBIN mg/dL  --   --  0.3   ANION GAP mmol/L 7.9   < > 10.8    < > = values in this interval not displayed.       CT Abdomen Pelvis With Contrast    Result Date: 11/13/2024  Impression: Acute interstitial edematous pancreatitis Electronically Signed: Bc Posadas  11/13/2024 8:57 PM EST  Workstation ID: [id number]       I reviewed the patient's new clinical results.    Assessment/Plan:     Active and Resolved Problems  Active Hospital Problems    Diagnosis  POA    **Acute pancreatitis [K85.90]  Yes      Resolved Hospital Problems   No resolved problems to display.     Acute pancreatitis on chronic  No clear cause  Abdominal pain slowly improving  Continue IV fluids, analgesics and clear  liquid diet  Likely d/c in AM if continues to improve  Will upgrade diet if abdominal pain continues to improve.   Can follow up out patient with her GI      VTE Prophylaxis:  Mechanical VTE prophylaxis orders are present.             Disposition Planning:     Barriers to Discharge:po intake tolerance  Anticipated Date of Discharge: 11/16  Place of Discharge: home      Time: 25 minutes     Code Status and Medical Interventions: CPR (Attempt to Resuscitate); Full Support   Ordered at: 11/13/24 5134     Code Status (Patient has no pulse and is not breathing):    CPR (Attempt to Resuscitate)     Medical Interventions (Patient has pulse or is breathing):    Full Support       Signature: Electronically signed by Dana Barrow MD, 11/15/24, 15:35 EST.  [facility] Hospitalist Team

## 2024-11-15 NOTE — PLAN OF CARE
Problem: Adult Inpatient Plan of Care  Goal: Plan of Care Review  11/15/2024 0109 by Elen Terry RN  Outcome: Progressing  Flowsheets (Taken 11/15/2024 0109)  Progress: improving  Plan of Care Reviewed With: patient  11/15/2024 0108 by Elen Terry RN  Outcome: Progressing  Flowsheets (Taken 11/15/2024 0108)  Progress: improving  Plan of Care Reviewed With: patient  Goal: Absence of Hospital-Acquired Illness or Injury  Intervention: Identify and Manage Fall Risk  Recent Flowsheet Documentation  Taken 11/15/2024 0003 by Elen Terry RN  Safety Promotion/Fall Prevention:   safety round/check completed   room organization consistent   nonskid shoes/slippers when out of bed  Taken 11/14/2024 2214 by Elen Terry RN  Safety Promotion/Fall Prevention:   nonskid shoes/slippers when out of bed   room organization consistent   safety round/check completed  Taken 11/14/2024 2020 by Elen Terry RN  Safety Promotion/Fall Prevention:   clutter free environment maintained   nonskid shoes/slippers when out of bed   lighting adjusted   room organization consistent   safety round/check completed  Intervention: Prevent Skin Injury  Recent Flowsheet Documentation  Taken 11/14/2024 2020 by Elen Terry RN  Body Position: position changed independently  Intervention: Prevent and Manage VTE (Venous Thromboembolism) Risk  Recent Flowsheet Documentation  Taken 11/14/2024 2020 by Elen Terry RN  VTE Prevention/Management: patient refused intervention  Intervention: Prevent Infection  Recent Flowsheet Documentation  Taken 11/15/2024 0003 by Elen Terry RN  Infection Prevention:   hand hygiene promoted   equipment surfaces disinfected   rest/sleep promoted   single patient room provided  Taken 11/14/2024 2214 by Elen Terry RN  Infection Prevention:   rest/sleep promoted   hand hygiene promoted   equipment surfaces disinfected  Taken 11/14/2024 2020 by Elen Terry RN  Infection  Prevention:   equipment surfaces disinfected   hand hygiene promoted   rest/sleep promoted   single patient room provided  Goal: Optimal Comfort and Wellbeing  Intervention: Monitor Pain and Promote Comfort  Recent Flowsheet Documentation  Taken 11/14/2024 2020 by Elen Terry, RN  Pain Management Interventions: (patient asked to shower)   pillow support provided   other (see comments)  Intervention: Provide Person-Centered Care  Recent Flowsheet Documentation  Taken 11/14/2024 2020 by Elen Terry RN  Trust Relationship/Rapport: care explained   Goal Outcome Evaluation:  Plan of Care Reviewed With: patient        Progress: improving

## 2024-11-16 ENCOUNTER — READMISSION MANAGEMENT (OUTPATIENT)
Dept: CALL CENTER | Facility: HOSPITAL | Age: 49
End: 2024-11-16
Payer: COMMERCIAL

## 2024-11-16 VITALS
HEIGHT: 64 IN | HEART RATE: 67 BPM | DIASTOLIC BLOOD PRESSURE: 64 MMHG | BODY MASS INDEX: 34.44 KG/M2 | TEMPERATURE: 97.4 F | WEIGHT: 201.72 LBS | OXYGEN SATURATION: 97 % | RESPIRATION RATE: 11 BRPM | SYSTOLIC BLOOD PRESSURE: 136 MMHG

## 2024-11-16 LAB
ANION GAP SERPL CALCULATED.3IONS-SCNC: 6 MMOL/L (ref 5–15)
BASOPHILS # BLD AUTO: 0.01 10*3/MM3 (ref 0–0.2)
BASOPHILS NFR BLD AUTO: 0.2 % (ref 0–1.5)
BUN SERPL-MCNC: 5 MG/DL (ref 6–20)
BUN/CREAT SERPL: 6.8 (ref 7–25)
CALCIUM SPEC-SCNC: 8.8 MG/DL (ref 8.6–10.5)
CHLORIDE SERPL-SCNC: 106 MMOL/L (ref 98–107)
CO2 SERPL-SCNC: 30 MMOL/L (ref 22–29)
CREAT SERPL-MCNC: 0.73 MG/DL (ref 0.57–1)
DEPRECATED RDW RBC AUTO: 42.8 FL (ref 37–54)
EGFRCR SERPLBLD CKD-EPI 2021: 102.9 ML/MIN/1.73
EOSINOPHIL # BLD AUTO: 0.09 10*3/MM3 (ref 0–0.4)
EOSINOPHIL NFR BLD AUTO: 1.6 % (ref 0.3–6.2)
ERYTHROCYTE [DISTWIDTH] IN BLOOD BY AUTOMATED COUNT: 12.7 % (ref 12.3–15.4)
GLUCOSE SERPL-MCNC: 76 MG/DL (ref 65–99)
HCT VFR BLD AUTO: 32.3 % (ref 34–46.6)
HGB BLD-MCNC: 10.2 G/DL (ref 12–15.9)
IMM GRANULOCYTES # BLD AUTO: 0.02 10*3/MM3 (ref 0–0.05)
IMM GRANULOCYTES NFR BLD AUTO: 0.4 % (ref 0–0.5)
LYMPHOCYTES # BLD AUTO: 2.26 10*3/MM3 (ref 0.7–3.1)
LYMPHOCYTES NFR BLD AUTO: 41 % (ref 19.6–45.3)
MCH RBC QN AUTO: 29.2 PG (ref 26.6–33)
MCHC RBC AUTO-ENTMCNC: 31.6 G/DL (ref 31.5–35.7)
MCV RBC AUTO: 92.6 FL (ref 79–97)
MONOCYTES # BLD AUTO: 0.35 10*3/MM3 (ref 0.1–0.9)
MONOCYTES NFR BLD AUTO: 6.4 % (ref 5–12)
NEUTROPHILS NFR BLD AUTO: 2.78 10*3/MM3 (ref 1.7–7)
NEUTROPHILS NFR BLD AUTO: 50.4 % (ref 42.7–76)
NRBC BLD AUTO-RTO: 0 /100 WBC (ref 0–0.2)
PLATELET # BLD AUTO: 154 10*3/MM3 (ref 140–450)
PMV BLD AUTO: 11.4 FL (ref 6–12)
POTASSIUM SERPL-SCNC: 4.4 MMOL/L (ref 3.5–5.2)
RBC # BLD AUTO: 3.49 10*6/MM3 (ref 3.77–5.28)
SODIUM SERPL-SCNC: 142 MMOL/L (ref 136–145)
WBC NRBC COR # BLD AUTO: 5.51 10*3/MM3 (ref 3.4–10.8)

## 2024-11-16 PROCEDURE — 25010000002 HYDROMORPHONE 1 MG/ML SOLUTION

## 2024-11-16 PROCEDURE — 80048 BASIC METABOLIC PNL TOTAL CA: CPT

## 2024-11-16 PROCEDURE — 85025 COMPLETE CBC W/AUTO DIFF WBC: CPT

## 2024-11-16 RX ADMIN — HYDROMORPHONE HYDROCHLORIDE 1 MG: 1 INJECTION, SOLUTION INTRAMUSCULAR; INTRAVENOUS; SUBCUTANEOUS at 05:44

## 2024-11-16 RX ADMIN — HYDROMORPHONE HYDROCHLORIDE 1 MG: 1 INJECTION, SOLUTION INTRAMUSCULAR; INTRAVENOUS; SUBCUTANEOUS at 11:39

## 2024-11-16 RX ADMIN — Medication 10 ML: at 09:42

## 2024-11-16 NOTE — NURSING NOTE
Patient requested her fluids be turned off as her IV site was sore. IV still patent with good blood return. Patient did not want the IV removed so she could still receive IV pain meds.   [FreeTextEntry1] : Annual Well Check  [de-identified] : Dm is a 74 year male here today for CARMELA. Patient's mood is good and overall health seems to be well. Pt stated he has not had a sinus infection for almost 2 years. However, pt c/o a mild cough x couple months that persists all day long. Pt reported he expectorates yellow phlegm. Pt does not have to force phlegm to come out. Pt denies any sinus pressure or congestion. Pt stated he has not had a Chest CT in the past but he did have an Xray last year. Patient reported he is no longer taking Asmanex 220 MCG or any other medication for his breathing. Pt stated his allergist had prescribed Ipratropium Bromide 0.6% nasal spray that provides relief for his postnasal drip. Pt reported he is finding it hard to f/u with allergist due to scheduling and was hoping to receive a refill for Ipratropium Bromide 0.6%  today. \par Pt reported he had went for physical therapy regarding his back pain. Pain was deemed to result from a pulled muscle by physical therapy \par \par Pt stated he is due for another colonoscopy, the last was in 2016 showing diverticulosis and hemorrhoids. Pt reported he saw uro in 2009 for urinary retention. Pt had been hospitalized and his prostate removed due to cancer. Pt regularly sees cardio Dr. Chen and had recently underwent bw. \par \par Pt reported he had received his 4th COVID-19 vaccination/2nd booster this past week. Pt denies any side effects from booster. Pt inquired about the pneumonia vaccination and Shingles vaccination. Pt denies ever having had pneumonia vaccination.

## 2024-11-16 NOTE — NURSING NOTE
Patient DC'd home with . Tolerated eating a soft-to-chew diet. IV removed, traveled via wheelchair to lobby/vehicle.

## 2024-11-16 NOTE — OUTREACH NOTE
Prep Survey      Flowsheet Row Responses   [facility] [place] patient discharged from? [place]   Is LACE score < 7 ? No   Eligibility [facility]   Date of Admission 11/13/24   Date of Discharge 11/16/24   Discharge Disposition Home or Self Care   Discharge diagnosis Acute pancreatitis   Does the patient have one of the following disease processes/diagnoses(primary or secondary)? Other   Prep survey completed? Yes            Christiane CHÁVEZ - Registered Nurse

## 2024-11-16 NOTE — DISCHARGE SUMMARY
[facility]  Discharge Summary    Date of Service: 2024  Patient Name: Dianna Claros  : 1975  MRN: [id number]    Date of Admission: 2024  Discharge Diagnosis:   Acute on chronic pancreatitis  Date of Discharge:  2024  Primary Care Physician: Anita Blanco MD      Presenting Problem:   Acute pancreatitis [K85.90]  Epigastric pain [R10.13]  Chronic pancreatitis, unspecified pancreatitis type [K86.1]  Nausea and vomiting, unspecified vomiting type [R11.2]    Active and Resolved Hospital Problems:  Active Hospital Problems    Diagnosis POA    **Acute pancreatitis [K85.90] Yes      Resolved Hospital Problems   No resolved problems to display.         Hospital Course     Brief HPI and Hospital Course:     48 y.o. female with a previous medical history of chronic pancreatitis who presented to [facility] on 2024 with  complaints of abdominal pain, nausea and vomiting that started today.  She states that she was nauseous and vomiting until she stopped eating but she continues to have pain.     In the ED, Lipase was 1038. Otherwise, labs are unremarkable.  CT of the abdomen and pelvis showed acute, edematous pancreatitis    -Patient to the floor kept initially n.p.o. started on IV fluid antiemetics and IV pain management, with improvement in her symptoms her diet slowly advanced and patient by this morning tolerated soft diet very well without any worsening her symptoms patient was advised to continue on GI soft diet yesterday and advance to regular diet by tomorrow            Reasons For Change In Medications and Indications for New Medications:      Day of Discharge     Vital Signs:  Temp:  [97.3 °F (36.3 °C)-98 °F (36.7 °C)] 97.3 °F (36.3 °C)  Heart Rate:  [59-75] 65  Resp:  [11-21] 11  BP: ()/(54-80) 114/54  Flow (L/min) (Oxygen Therapy):  [2] 2    Physical Exam:    General Appearance:    Alert, cooperative, in no acute distress    Head:    Normocephalic, without obvious abnormality, atraumatic   Eyes:            conjunctivae and sclerae normal, no   icterus, no pallor, corneas  clear, PERRLA   Neck:   No adenopathy, supple, trachea midline, no thyromegaly, no   carotid bruit, no JVD   Lungs:     Clear to auscultation,respirations regular, even and                  unlabored    Heart:    Regular rhythm and normal rate, normal S1 and S2, no            murmur, no gallop, no rub, no click   Abdomen:     Normal bowel sounds, no masses, no organomegaly, soft        non-tender, non-distended, no guarding, no rebound                No tenderness   Extremities:   Moves all extremities well, no edema, no cyanosis, no             redness   Lymph nodes:   No palpable adenopathy   Neurologic:   Cranial nerves 2 - 12 grossly intact, sensation intact, DTR       present and equal bilaterally          Pertinent  and/or Most Recent Results     LAB RESULTS:      Lab 11/16/24  0413 11/15/24  0431 11/14/24  0338 11/13/24  1844   WBC 5.51 5.95 6.71 8.98   HEMOGLOBIN 10.2* 10.5* 11.0* 13.1   HEMATOCRIT 32.3* 32.8* 35.1 40.3   PLATELETS 154 166 181 221   NEUTROS ABS 2.78 3.21 3.84 4.73   IMMATURE GRANS (ABS) 0.02 0.02 0.02 0.02   LYMPHS ABS 2.26 2.33 2.34 3.63*   MONOS ABS 0.35 0.32 0.42 0.45   EOS ABS 0.09 0.06 0.08 0.12   MCV 92.6 92.7 93.1 92.0         Lab 11/16/24  0413 11/15/24  0431 11/14/24  0338 11/13/24  1844   SODIUM 142 140 142 144   POTASSIUM 4.4 3.9 4.1 4.3   CHLORIDE 106 104 109* 106   CO2 30.0* 28.1 24.6 27.2   ANION GAP 6.0 7.9 8.4 10.8   BUN 5* 7 10 12   CREATININE 0.73 0.83 0.82 0.83   EGFR 102.9 88.2 89.5 88.2   GLUCOSE 76 73 81 94   CALCIUM 8.8 8.7 8.3* 9.6         Lab 11/13/24  1844   TOTAL PROTEIN 7.3   ALBUMIN 4.3   GLOBULIN 3.0   ALT (SGPT) 17   AST (SGOT) 18   BILIRUBIN 0.3   ALK PHOS 118*   LIPASE 1,038*                     Brief Urine Lab Results  (Last result in the past 365 days)        Color   Clarity   Blood   Leuk Est   Nitrite    Protein   CREAT   Urine HCG        11/13/24 1844 Yellow   Clear   Negative   Negative   Negative   Negative                 Microbiology Results (last 10 days)       ** No results found for the last 240 hours. **            CT Abdomen Pelvis With Contrast    Result Date: 11/13/2024  Impression: Impression: Acute interstitial edematous pancreatitis Electronically Signed: Bc Posadas  11/13/2024 8:57 PM EST  Workstation ID: [id number]                 Labs Pending at Discharge:      Procedures Performed           Consults:   Consults       Date and Time Order Name Status Description    11/13/2024  9:06 PM Hospitalist (on-call MD unless specified)                Discharge Details        Discharge Medications        Changes to Medications        Instructions Start Date   famotidine 40 MG tablet  Commonly known as: PEPCID  What changed: Another medication with the same name was removed. Continue taking this medication, and follow the directions you see here.   40 mg, Nightly             Continue These Medications        Instructions Start Date   acetaminophen 325 MG tablet  Commonly known as: TYLENOL   650 mg, Every 6 Hours PRN      colesevelam 625 MG tablet  Commonly known as: WELCHOL   625 mg, 2 Times Daily With Meals      HYDROcodone-acetaminophen 5-325 MG per tablet  Commonly known as: NORCO   1 tablet, Oral, Every 6 Hours PRN      hyoscyamine 0.125 MG SL tablet  Commonly known as: LEVSIN   0.125 mg, Every 6 Hours PRN      nortriptyline 10 MG capsule  Commonly known as: PAMELOR   10 mg, Nightly      ondansetron 4 MG tablet  Commonly known as: ZOFRAN   4 mg, Oral, Every 8 Hours PRN      ondansetron ODT 4 MG disintegrating tablet  Commonly known as: ZOFRAN-ODT   4 mg, Every 8 Hours PRN      Zenpep 36972-19741 units capsule delayed-release particles  Generic drug: Pancrelipase (Lip-Prot-Amyl)   2 capsules, 3 Times Daily With Meals             Stop These Medications      prochlorperazine 10 MG tablet  Commonly known as:  COMPAZINE              Allergies   Allergen Reactions    Roxicodone [Oxycodone Hcl] Itching and Nausea Only    Toradol [Ketorolac Tromethamine] Itching and Irritability    Gabapentin Nausea And Vomiting    Naproxen Nausea And Vomiting    Pregabalin Hives    Morphine Itching         Discharge Disposition: Home or Self Care    Diet:  Hospital:  Diet Order   Procedures    Diet: Regular/House; Texture: Soft to Chew (NDD 3); Soft to Chew: Chopped Meat; Fluid Consistency: Thin (IDDSI 0)         Discharge Activity:         CODE STATUS:  Code Status and Medical Interventions: CPR (Attempt to Resuscitate); Full Support   Ordered at: 11/13/24 2214     Code Status (Patient has no pulse and is not breathing):    CPR (Attempt to Resuscitate)     Medical Interventions (Patient has pulse or is breathing):    Full Support         Future Appointments   Date Time Provider Department Center   12/3/2024  3:45 PM Anita Blanco MD [facility]           Time spent on Discharge including face to face service:  >30 minutes    Signature: Electronically signed by Myron Mcnamara MD, 11/16/24, 10:45 EST.  [facility]ist Team

## 2024-11-16 NOTE — PLAN OF CARE
Problem: Adult Inpatient Plan of Care  Goal: Absence of Hospital-Acquired Illness or Injury  Intervention: Identify and Manage Fall Risk  Recent Flowsheet Documentation  Taken 11/16/2024 0600 by Abhilash Hernandez LPN  Safety Promotion/Fall Prevention:   assistive device/personal items within reach   clutter free environment maintained   nonskid shoes/slippers when out of bed   room organization consistent   safety round/check completed  Taken 11/16/2024 0400 by Abhilash Hernandez LPN  Safety Promotion/Fall Prevention:   assistive device/personal items within reach   clutter free environment maintained   nonskid shoes/slippers when out of bed   safety round/check completed   room organization consistent  Taken 11/16/2024 0200 by Abhilash Hernandez LPN  Safety Promotion/Fall Prevention:   assistive device/personal items within reach   clutter free environment maintained   nonskid shoes/slippers when out of bed   room organization consistent   safety round/check completed  Taken 11/16/2024 0000 by Abhilash Hernandez LPN  Safety Promotion/Fall Prevention:   clutter free environment maintained   assistive device/personal items within reach   nonskid shoes/slippers when out of bed   safety round/check completed   room organization consistent  Taken 11/15/2024 2200 by Abhilash Hernandez LPN  Safety Promotion/Fall Prevention:   assistive device/personal items within reach   clutter free environment maintained   nonskid shoes/slippers when out of bed   room organization consistent   safety round/check completed  Taken 11/15/2024 2016 by Abhilash Hernandez LPN  Safety Promotion/Fall Prevention:   assistive device/personal items within reach   clutter free environment maintained   nonskid shoes/slippers when out of bed   room organization consistent   safety round/check completed  Intervention: Prevent Skin Injury  Recent Flowsheet Documentation  Taken 11/16/2024 0000 by Abhilash Hernandez LPN  Body Position: position changed  independently  Skin Protection: transparent dressing maintained  Taken 11/15/2024 2016 by Abhilash Hernandez LPN  Body Position: position changed independently  Skin Protection: transparent dressing maintained  Intervention: Prevent and Manage VTE (Venous Thromboembolism) Risk  Recent Flowsheet Documentation  Taken 11/16/2024 0000 by Abhilash Hernandez LPN  VTE Prevention/Management:   bilateral   SCDs (sequential compression devices) on  Taken 11/15/2024 2016 by Abhilash Hernandez LPN  VTE Prevention/Management:   bilateral   SCDs (sequential compression devices) on  Intervention: Prevent Infection  Recent Flowsheet Documentation  Taken 11/16/2024 0600 by Abhilash Hernandez LPN  Infection Prevention:   environmental surveillance performed   equipment surfaces disinfected   hand hygiene promoted   personal protective equipment utilized   single patient room provided   rest/sleep promoted  Taken 11/16/2024 0400 by Abhilash Hernandez LPN  Infection Prevention:   environmental surveillance performed   equipment surfaces disinfected   hand hygiene promoted   personal protective equipment utilized   rest/sleep promoted   single patient room provided  Taken 11/16/2024 0200 by Abhilash Hernandez LPN  Infection Prevention:   environmental surveillance performed   equipment surfaces disinfected   hand hygiene promoted   rest/sleep promoted   single patient room provided   personal protective equipment utilized  Taken 11/16/2024 0000 by Abhilash Hernandez LPN  Infection Prevention:   environmental surveillance performed   equipment surfaces disinfected   hand hygiene promoted   personal protective equipment utilized   rest/sleep promoted   single patient room provided  Taken 11/15/2024 2200 by Abhilash Hernandez LPN  Infection Prevention:   environmental surveillance performed   equipment surfaces disinfected   hand hygiene promoted   personal protective equipment utilized   rest/sleep promoted   single patient room provided  Taken 11/15/2024 2016  by Abhilash Hernandez LPN  Infection Prevention:   environmental surveillance performed   equipment surfaces disinfected   hand hygiene promoted   personal protective equipment utilized   rest/sleep promoted   single patient room provided  Goal: Optimal Comfort and Wellbeing  Intervention: Monitor Pain and Promote Comfort  Recent Flowsheet Documentation  Taken 11/15/2024 2345 by Abhilash Hernandez LPN  Pain Management Interventions: pain medication given  Taken 11/15/2024 2016 by Abhilash Hernandez LPN  Pain Management Interventions: pain medication given  Intervention: Provide Person-Centered Care  Recent Flowsheet Documentation  Taken 11/16/2024 0400 by Abhilash Hernandez LPN  Trust Relationship/Rapport:   care explained   choices provided   emotional support provided   empathic listening provided   questions answered   questions encouraged   reassurance provided   thoughts/feelings acknowledged  Taken 11/16/2024 0000 by Abhilash Hernandez LPN  Trust Relationship/Rapport:   care explained   choices provided   emotional support provided   empathic listening provided   questions answered   questions encouraged   reassurance provided   thoughts/feelings acknowledged  Taken 11/15/2024 2016 by Abhilash Hernandez LPN  Trust Relationship/Rapport:   care explained   choices provided   emotional support provided   empathic listening provided   questions answered   questions encouraged   reassurance provided   thoughts/feelings acknowledged     Problem: Fall Injury Risk  Goal: Absence of Fall and Fall-Related Injury  Intervention: Identify and Manage Contributors  Recent Flowsheet Documentation  Taken 11/16/2024 0400 by Abhilash Hernandez LPN  Self-Care Promotion: independence encouraged  Taken 11/16/2024 0000 by Abhilash Hernandez LPN  Self-Care Promotion: independence encouraged  Taken 11/15/2024 2016 by Abhilash Hernandez LPN  Medication Review/Management: medications reviewed  Self-Care Promotion: independence encouraged  Intervention: Promote  Injury-Free Environment  Recent Flowsheet Documentation  Taken 11/16/2024 0600 by Abhilash Hernandez LPN  Safety Promotion/Fall Prevention:   assistive device/personal items within reach   clutter free environment maintained   nonskid shoes/slippers when out of bed   room organization consistent   safety round/check completed  Taken 11/16/2024 0400 by Abhialsh Hernandez LPN  Safety Promotion/Fall Prevention:   assistive device/personal items within reach   clutter free environment maintained   nonskid shoes/slippers when out of bed   safety round/check completed   room organization consistent  Taken 11/16/2024 0200 by Abhilash Hernandez LPN  Safety Promotion/Fall Prevention:   assistive device/personal items within reach   clutter free environment maintained   nonskid shoes/slippers when out of bed   room organization consistent   safety round/check completed  Taken 11/16/2024 0000 by Abhilash Hernandez LPN  Safety Promotion/Fall Prevention:   clutter free environment maintained   assistive device/personal items within reach   nonskid shoes/slippers when out of bed   safety round/check completed   room organization consistent  Taken 11/15/2024 2200 by Abhilash Hernandez LPN  Safety Promotion/Fall Prevention:   assistive device/personal items within reach   clutter free environment maintained   nonskid shoes/slippers when out of bed   room organization consistent   safety round/check completed  Taken 11/15/2024 2016 by Abhilash Hernandez LPN  Safety Promotion/Fall Prevention:   assistive device/personal items within reach   clutter free environment maintained   nonskid shoes/slippers when out of bed   room organization consistent   safety round/check completed   Goal Outcome Evaluation:

## 2024-11-18 ENCOUNTER — TRANSITIONAL CARE MANAGEMENT TELEPHONE ENCOUNTER (OUTPATIENT)
Dept: CALL CENTER | Facility: HOSPITAL | Age: 49
End: 2024-11-18
Payer: COMMERCIAL

## 2024-11-18 NOTE — PAYOR COMM NOTE
"This is clinical/discharge notification for Natalia Pena.  Reference/Auth#: [id number].    Dc'd on 11/16/24 routine home.     EXTENDED AUTHORIZATION PENDING:     Please call or fax determination to contact below.   Thank you.    Renetta Francisco RN, [facility]  Utilization Review Nurse  [address] [address]   [phone]  Fx [phone]       Natalia Pena (48 y.o. Female)       Date of Birth   1975    Social Security Number       Address   [address] [facility] [address]    Home Phone   [phone]    MRN   [id number]       Alevism   None    Marital Status                               Admission Date   11/13/24    Admission Type   Emergency    Admitting Provider   Remigio Galvan MD    Attending Provider       Department, Room/Bed   [address], 2210/1       Discharge Date   11/16/2024    Discharge Disposition   Home or Self Care    Discharge Destination                                 Attending Provider: (none)   Allergies: Roxicodone [Oxycodone Hcl], Toradol [Ketorolac Tromethamine], Gabapentin, Naproxen, Pregabalin, Morphine    Isolation: None   Infection: None   Code Status: Prior    Ht: 162.6 cm (64\")   Wt: 91.5 kg (201 lb 11.5 oz)    Admission Cmt: None   Principal Problem: Acute pancreatitis [K85.90]                   Active Insurance as of 11/13/2024       Primary Coverage       Payor Plan Insurance Group Employer/Plan Group    LUANNE BLUE CROSS [facility] BLUE [facility] PPO [id number]       Payor Plan Address Payor Plan Phone Number Payor Plan Fax Number Effective Dates    [address] [phone]  7/1/2024 - None Entered    [facility] [address]         Subscriber Name Subscriber Birth Date Member ID       NATALIA PENA 1975 [id number]               Secondary Coverage       Payor Plan Insurance Group Employer/Plan Group    [employer] [id number]       Payor Plan " Address Payor Plan Phone Number Payor Plan Fax Number Effective Dates    MAIL STOP:   2023 - None Entered    [address] [address]       [facility] [address]         Subscriber Name Subscriber Birth Date Member ID       NATALIA PENA 1975 [id number]                     Emergency Contacts        (Rel.) Home Phone Work Phone Mobile Phone    LANDEN LARA (Son) -- -- [phone]    TONI PENA (Spouse) [phone] -- --              Operative/Procedure Notes (last 4 days)  Notes from 24 0915 through 24 0915   No notes of this type exist for this encounter.          Physician Progress Notes (last 4 days)        Dana Barrow MD at 11/15/24 1534              [facility] MEDICINE SERVICE  DAILY PROGRESS NOTE    NAME: Natalia Pena  : 1975  MRN: [id number]      LOS: 2 days     PROVIDER OF SERVICE: Dana Barrow MD    Chief Complaint: Acute pancreatitis    Subjective:     Interval History:  History taken from: patient    Patient continues to have abdominal pain although improving. Tolerated clear liquids. No fever/chills. Has had work up previously for chronic pancreatitis, cause unclear. No recent alcohol use, no changes in medications noted.        Review of Systems:   Review of Systems   Constitutional:  Negative for chills and fever.   HENT:  Negative for congestion, ear discharge, ear pain, sinus pain and sore throat.    Respiratory:  Negative for apnea, cough, chest tightness, shortness of breath and wheezing.    Cardiovascular:  Negative for chest pain and palpitations.   Gastrointestinal:  Positive for abdominal pain and nausea. Negative for blood in stool, constipation, diarrhea and vomiting.   Endocrine: Negative for cold intolerance and heat intolerance.   Genitourinary:  Negative for dysuria, flank pain, hematuria and urgency.   Musculoskeletal:  Negative for arthralgias, back pain, joint swelling, myalgias and neck pain.   Skin:  Negative.    Neurological: Negative.    Hematological:  Does not bruise/bleed easily.   Psychiatric/Behavioral:  Negative for agitation, confusion, hallucinations, sleep disturbance and suicidal ideas. The patient is not nervous/anxious.        Objective:     Vital Signs  Temp:  [97.6 °F (36.4 °C)-98.3 °F (36.8 °C)] 98 °F (36.7 °C)  Heart Rate:  [61-85] 62  Resp:  [11-16] 11  BP: (108-126)/(52-83) 108/60   Body mass index is 34.63 kg/m².    Physical Exam  Physical Exam  Constitutional:       General: She is not in acute distress.     Appearance: Normal appearance.   HENT:      Head: Normocephalic and atraumatic.      Nose: Nose normal.      Mouth/Throat:      Mouth: Mucous membranes are moist.   Eyes:      Extraocular Movements: Extraocular movements intact.      Conjunctiva/sclera: Conjunctivae normal.   Cardiovascular:      Rate and Rhythm: Normal rate and regular rhythm.   Pulmonary:      Effort: Pulmonary effort is normal.      Breath sounds: Normal breath sounds.   Abdominal:      General: Abdomen is flat. Bowel sounds are normal.      Palpations: Abdomen is soft.      Tenderness: There is abdominal tenderness. There is no guarding or rebound.   Musculoskeletal:         General: Normal range of motion.      Cervical back: Normal range of motion and neck supple.   Skin:     General: Skin is warm and dry.   Neurological:      General: No focal deficit present.      Mental Status: She is alert.   Psychiatric:         Mood and Affect: Mood normal.            Diagnostic Data    Results from last 7 days   Lab Units 11/15/24  0431 11/14/24  0338 11/13/24  1844   WBC 10*3/mm3 5.95   < > 8.98   HEMOGLOBIN g/dL 10.5*   < > 13.1   HEMATOCRIT % 32.8*   < > 40.3   PLATELETS 10*3/mm3 166   < > 221   GLUCOSE mg/dL 73   < > 94   CREATININE mg/dL 0.83   < > 0.83   BUN mg/dL 7   < > 12   SODIUM mmol/L 140   < > 144   POTASSIUM mmol/L 3.9   < > 4.3   AST (SGOT) U/L  --   --  18   ALT (SGPT) U/L  --   --  17   ALK PHOS U/L  --    --  118*   BILIRUBIN mg/dL  --   --  0.3   ANION GAP mmol/L 7.9   < > 10.8    < > = values in this interval not displayed.       CT Abdomen Pelvis With Contrast    Result Date: 2024  Impression: Acute interstitial edematous pancreatitis Electronically Signed: Bc KYLER Posadas  2024 8:57 PM EST  Workstation ID: [id number]       I reviewed the patient's new clinical results.    Assessment/Plan:     Active and Resolved Problems  Active Hospital Problems    Diagnosis  POA    **Acute pancreatitis [K85.90]  Yes      Resolved Hospital Problems   No resolved problems to display.     Acute pancreatitis on chronic  No clear cause  Abdominal pain slowly improving  Continue IV fluids, analgesics and clear liquid diet  Likely d/c in AM if continues to improve  Will upgrade diet if abdominal pain continues to improve.   Can follow up out patient with her GI      VTE Prophylaxis:  Mechanical VTE prophylaxis orders are present.             Disposition Planning:     Barriers to Discharge:po intake tolerance  Anticipated Date of Discharge:   Place of Discharge: home      Time: 25 minutes     Code Status and Medical Interventions: CPR (Attempt to Resuscitate); Full Support   Ordered at: 24 2214     Code Status (Patient has no pulse and is not breathing):    CPR (Attempt to Resuscitate)     Medical Interventions (Patient has pulse or is breathing):    Full Support       Signature: Electronically signed by Dana Barrow MD, 11/15/24, 15:35 EST.  [facility] Hospitalist Team     Electronically signed by Dana Barrow MD at 11/15/24 1540       Remigio Galvan MD at 24 1347              [facility] MEDICINE SERVICE  DAILY PROGRESS NOTE    NAME: Dianna Claros  : 1975  MRN: [id number]      LOS: 1 day     PROVIDER OF SERVICE: Remigio Galvan MD    Chief Complaint: Acute pancreatitis    Subjective:     Interval History:  History taken from: patient    Mild improvement in her symptoms.   Some vomiting this morning.        Review of Systems:   Review of Systems    Objective:     Vital Signs  Temp:  [97.3 °F (36.3 °C)-97.8 °F (36.6 °C)] 97.8 °F (36.6 °C)  Heart Rate:  [63-81] 74  Resp:  [10-20] 11  BP: ()/(40-84) 106/71   Body mass index is 34.63 kg/m².    Physical Exam  Physical Exam  Constitutional:       General: She is not in acute distress.     Appearance: She is ill-appearing.   Cardiovascular:      Rate and Rhythm: Normal rate and regular rhythm.   Pulmonary:      Effort: Pulmonary effort is normal.      Breath sounds: Normal breath sounds.   Abdominal:      Tenderness: There is abdominal tenderness.   Neurological:      Mental Status: She is alert.            Diagnostic Data    Results from last 7 days   Lab Units 11/14/24  0338 11/13/24  1844   WBC 10*3/mm3 6.71 8.98   HEMOGLOBIN g/dL 11.0* 13.1   HEMATOCRIT % 35.1 40.3   PLATELETS 10*3/mm3 181 221   GLUCOSE mg/dL 81 94   CREATININE mg/dL 0.82 0.83   BUN mg/dL 10 12   SODIUM mmol/L 142 144   POTASSIUM mmol/L 4.1 4.3   AST (SGOT) U/L  --  18   ALT (SGPT) U/L  --  17   ALK PHOS U/L  --  118*   BILIRUBIN mg/dL  --  0.3   ANION GAP mmol/L 8.4 10.8       CT Abdomen Pelvis With Contrast    Result Date: 11/13/2024  Impression: Acute interstitial edematous pancreatitis Electronically Signed: Bc Posadas  11/13/2024 8:57 PM EST  Workstation ID: [id number]       I reviewed the patient's new clinical results.    Assessment/Plan:     Active and Resolved Problems  Active Hospital Problems    Diagnosis  POA    **Acute pancreatitis [K85.90]  Yes      Resolved Hospital Problems   No resolved problems to display.         Acute on chronic pancreatitis  -Lipase 1038  -CT of the abdomen and pelvis reviewed, acute edematous pancreatitis noted  -IV fluids ordered  -Analgesics and antiemetics as needed  -Advance diet to clear liquids today.  Will see how she does.  Hopefully increase further tomorrow.  .-Continue home pancrelipase, Pepcid, Welchol when  eating    VTE Prophylaxis:  Mechanical VTE prophylaxis orders are present.             Disposition Planning:     Barriers to Discharge: Able to tolerate diet, symptoms  Anticipated Date of Discharge: 2024  Place of Discharge: Home likely      Time: 50 minutes     Code Status and Medical Interventions: CPR (Attempt to Resuscitate); Full Support   Ordered at: 24 2214     Code Status (Patient has no pulse and is not breathing):    CPR (Attempt to Resuscitate)     Medical Interventions (Patient has pulse or is breathing):    Full Support       Signature: Electronically signed by Remigio Galvan MD, 24, 13:47 EST.  [facility] Hospitalist Team      Electronically signed by Remigio Galvan MD at 24 1349       Consult Notes (last 4 days)  Notes from 24 0915 through 24 0915   No notes of this type exist for this encounter.          Discharge Summary        Myron Mcnamara MD at 24 1045                       [facility]  Discharge Summary    Date of Service: 2024  Patient Name: Dianna Claros  : 1975  MRN: [id number]    Date of Admission: 2024  Discharge Diagnosis:   Acute on chronic pancreatitis  Date of Discharge:  2024  Primary Care Physician: Anita Blanco MD      Presenting Problem:   Acute pancreatitis [K85.90]  Epigastric pain [R10.13]  Chronic pancreatitis, unspecified pancreatitis type [K86.1]  Nausea and vomiting, unspecified vomiting type [R11.2]    Active and Resolved Hospital Problems:  Active Hospital Problems    Diagnosis POA    **Acute pancreatitis [K85.90] Yes      Resolved Hospital Problems   No resolved problems to display.         Hospital Course     Brief HPI and Hospital Course:     48 y.o. female with a previous medical history of chronic pancreatitis who presented to [facility] on 2024 with  complaints of abdominal pain, nausea and vomiting that started today.  She states that she was  nauseous and vomiting until she stopped eating but she continues to have pain.     In the ED, Lipase was 1038. Otherwise, labs are unremarkable.  CT of the abdomen and pelvis showed acute, edematous pancreatitis    -Patient to the floor kept initially n.p.o. started on IV fluid antiemetics and IV pain management, with improvement in her symptoms her diet slowly advanced and patient by this morning tolerated soft diet very well without any worsening her symptoms patient was advised to continue on GI soft diet yesterday and advance to regular diet by tomorrow            Reasons For Change In Medications and Indications for New Medications:      Day of Discharge     Vital Signs:  Temp:  [97.3 °F (36.3 °C)-98 °F (36.7 °C)] 97.3 °F (36.3 °C)  Heart Rate:  [59-75] 65  Resp:  [11-21] 11  BP: ()/(54-80) 114/54  Flow (L/min) (Oxygen Therapy):  [2] 2    Physical Exam:    General Appearance:    Alert, cooperative, in no acute distress   Head:    Normocephalic, without obvious abnormality, atraumatic   Eyes:            conjunctivae and sclerae normal, no   icterus, no pallor, corneas  clear, PERRLA   Neck:   No adenopathy, supple, trachea midline, no thyromegaly, no   carotid bruit, no JVD   Lungs:     Clear to auscultation,respirations regular, even and                  unlabored    Heart:    Regular rhythm and normal rate, normal S1 and S2, no            murmur, no gallop, no rub, no click   Abdomen:     Normal bowel sounds, no masses, no organomegaly, soft        non-tender, non-distended, no guarding, no rebound                No tenderness   Extremities:   Moves all extremities well, no edema, no cyanosis, no             redness   Lymph nodes:   No palpable adenopathy   Neurologic:   Cranial nerves 2 - 12 grossly intact, sensation intact, DTR       present and equal bilaterally          Pertinent  and/or Most Recent Results     LAB RESULTS:      Lab 11/16/24  0413 11/15/24  0431 11/14/24  0338 11/13/24  1844   WBC  5.51 5.95 6.71 8.98   HEMOGLOBIN 10.2* 10.5* 11.0* 13.1   HEMATOCRIT 32.3* 32.8* 35.1 40.3   PLATELETS 154 166 181 221   NEUTROS ABS 2.78 3.21 3.84 4.73   IMMATURE GRANS (ABS) 0.02 0.02 0.02 0.02   LYMPHS ABS 2.26 2.33 2.34 3.63*   MONOS ABS 0.35 0.32 0.42 0.45   EOS ABS 0.09 0.06 0.08 0.12   MCV 92.6 92.7 93.1 92.0         Lab 11/16/24  0413 11/15/24  0431 11/14/24  0338 11/13/24  1844   SODIUM 142 140 142 144   POTASSIUM 4.4 3.9 4.1 4.3   CHLORIDE 106 104 109* 106   CO2 30.0* 28.1 24.6 27.2   ANION GAP 6.0 7.9 8.4 10.8   BUN 5* 7 10 12   CREATININE 0.73 0.83 0.82 0.83   EGFR 102.9 88.2 89.5 88.2   GLUCOSE 76 73 81 94   CALCIUM 8.8 8.7 8.3* 9.6         Lab 11/13/24  1844   TOTAL PROTEIN 7.3   ALBUMIN 4.3   GLOBULIN 3.0   ALT (SGPT) 17   AST (SGOT) 18   BILIRUBIN 0.3   ALK PHOS 118*   LIPASE 1,038*                     Brief Urine Lab Results  (Last result in the past 365 days)        Color   Clarity   Blood   Leuk Est   Nitrite   Protein   CREAT   Urine HCG        11/13/24 1844 Yellow   Clear   Negative   Negative   Negative   Negative                 Microbiology Results (last 10 days)       ** No results found for the last 240 hours. **            CT Abdomen Pelvis With Contrast    Result Date: 11/13/2024  Impression: Impression: Acute interstitial edematous pancreatitis Electronically Signed: Bc Posadas  11/13/2024 8:57 PM EST  Workstation ID: [id number]                 Labs Pending at Discharge:      Procedures Performed           Consults:   Consults       Date and Time Order Name Status Description    11/13/2024  9:06 PM Hospitalist (on-call MD unless specified)                Discharge Details        Discharge Medications        Changes to Medications        Instructions Start Date   famotidine 40 MG tablet  Commonly known as: PEPCID  What changed: Another medication with the same name was removed. Continue taking this medication, and follow the directions you see here.   40 mg, Nightly              Continue These Medications        Instructions Start Date   acetaminophen 325 MG tablet  Commonly known as: TYLENOL   650 mg, Every 6 Hours PRN      colesevelam 625 MG tablet  Commonly known as: WELCHOL   625 mg, 2 Times Daily With Meals      HYDROcodone-acetaminophen 5-325 MG per tablet  Commonly known as: NORCO   1 tablet, Oral, Every 6 Hours PRN      hyoscyamine 0.125 MG SL tablet  Commonly known as: LEVSIN   0.125 mg, Every 6 Hours PRN      nortriptyline 10 MG capsule  Commonly known as: PAMELOR   10 mg, Nightly      ondansetron 4 MG tablet  Commonly known as: ZOFRAN   4 mg, Oral, Every 8 Hours PRN      ondansetron ODT 4 MG disintegrating tablet  Commonly known as: ZOFRAN-ODT   4 mg, Every 8 Hours PRN      Zenpep 67678-45035 units capsule delayed-release particles  Generic drug: Pancrelipase (Lip-Prot-Amyl)   2 capsules, 3 Times Daily With Meals             Stop These Medications      prochlorperazine 10 MG tablet  Commonly known as: COMPAZINE              Allergies   Allergen Reactions    Roxicodone [Oxycodone Hcl] Itching and Nausea Only    Toradol [Ketorolac Tromethamine] Itching and Irritability    Gabapentin Nausea And Vomiting    Naproxen Nausea And Vomiting    Pregabalin Hives    Morphine Itching         Discharge Disposition: Home or Self Care    Diet:  Hospital:  Diet Order   Procedures    Diet: Regular/House; Texture: Soft to Chew (NDD 3); Soft to Chew: Chopped Meat; Fluid Consistency: Thin (IDDSI 0)         Discharge Activity:         CODE STATUS:  Code Status and Medical Interventions: CPR (Attempt to Resuscitate); Full Support   Ordered at: 11/13/24 8726     Code Status (Patient has no pulse and is not breathing):    CPR (Attempt to Resuscitate)     Medical Interventions (Patient has pulse or is breathing):    Full Support         Future Appointments   Date Time Provider Department Center   12/3/2024  3:45 PM Anita Blanco MD [facility]           Time spent on Discharge including  face to face service:  >30 minutes    Signature: Electronically signed by Myron Mcnamara MD, 11/16/24, 10:45 EST.  [facility] Hospitalist Team      Electronically signed by Myron Mcnamara MD at 11/16/24 1048

## 2024-11-18 NOTE — PAYOR COMM NOTE
"This is discharge notification for Natalia Pena.  Dc'd on 11/16/24 routine home.    AUTHORIZATION : [id number]   PLEASE FAX OR CALL DETERMINATION TO CONTACT BELOW:   THANK YOU.      Renetta Francisco RN, CCM  Utilization Nurse  [facility]   [address] [place] [address]   [phone]  Fx [phone]     Natalia Pena (48 y.o. Female)       Date of Birth   1975    Social Security Number       Address   [address] [facility] [address] [facility] [address]    Home Phone   [phone]    MRN   [id number]       Muslim   None    Marital Status                               Admission Date   11/13/24    Admission Type   Emergency    Admitting Provider   Remigio Galvan MD    Attending Provider       Department, Room/Bed   [facility] 2F, 2210/1       Discharge Date   11/16/2024    Discharge Disposition   Home or Self Care    Discharge Destination                                 Attending Provider: (none)   Allergies: Roxicodone [Oxycodone Hcl], Toradol [Ketorolac Tromethamine], Gabapentin, Naproxen, Pregabalin, Morphine    Isolation: None   Infection: None   Code Status: Prior    Ht: 162.6 cm (64\")   Wt: 91.5 kg (201 lb 11.5 oz)    Admission Cmt: None   Principal Problem: Acute pancreatitis [K85.90]                   Active Insurance as of 11/13/2024       Primary Coverage       Payor Plan Insurance Group Employer/Plan Group    [employer] BLUE [employer]OSS [employer] [facility] BLUE [facility] PPO [id number]       Payor Plan Address Payor Plan Phone Number Payor Plan Fax Number Effective Dates    [address] [phone]  7/1/2024 - None Entered    [facility] [id number]         Subscriber Name Subscriber Birth Date Member ID       NATALIA PENA 1975 [id number]               Secondary Coverage       Payor Plan Insurance Group Employer/Plan Group    [employer] MEDICAID [facility] -ANTHEM [id number]       Payor Plan Address Payor Plan Phone Number Payor Plan Fax Number Effective " Dates    MAIL STOP:   2023 - None Entered    [address] [address]       [facility] [id number]         Subscriber Name Subscriber Birth Date Member ID       NATALIA PENA 1975 [id number]                     Emergency Contacts        (Rel.) Home Phone Work Phone Mobile Phone    LANDEN LARA (Son) -- -- [phone]    TONI PENA (Spouse) [phone] -- --                 Discharge Summary        Myron Mcnamara MD at [facility]                       [facility] Medicine Services  Discharge Summary    Date of Service: 2024  Patient Name: Natalia Pena  : 1975  MRN: [id number]    Date of Admission: 2024  Discharge Diagnosis:   Acute on chronic pancreatitis  Date of Discharge:  2024  Primary Care Physician: Anita Blanco MD      Presenting Problem:   Acute pancreatitis [K85.90]  Epigastric pain [R10.13]  Chronic pancreatitis, unspecified pancreatitis type [K86.1]  Nausea and vomiting, unspecified vomiting type [R11.2]    Active and Resolved Hospital Problems:  Active Hospital Problems    Diagnosis POA    **Acute pancreatitis [K85.90] Yes      Resolved Hospital Problems   No resolved problems to display.         Hospital Course     Brief HPI and Hospital Course:     48 y.o. female with a previous medical history of chronic pancreatitis who presented to [facility] on 2024 with  complaints of abdominal pain, nausea and vomiting that started today.  She states that she was nauseous and vomiting until she stopped eating but she continues to have pain.     In the ED, Lipase was 1038. Otherwise, labs are unremarkable.  CT of the abdomen and pelvis showed acute, edematous pancreatitis    -Patient to the floor kept initially n.p.o. started on IV fluid antiemetics and IV pain management, with improvement in her symptoms her diet slowly advanced and patient by this morning tolerated soft diet very well without any worsening her symptoms  patient was advised to continue on GI soft diet yesterday and advance to regular diet by tomorrow            Reasons For Change In Medications and Indications for New Medications:      Day of Discharge     Vital Signs:  Temp:  [97.3 °F (36.3 °C)-98 °F (36.7 °C)] 97.3 °F (36.3 °C)  Heart Rate:  [59-75] 65  Resp:  [11-21] 11  BP: ()/(54-80) 114/54  Flow (L/min) (Oxygen Therapy):  [2] 2    Physical Exam:    General Appearance:    Alert, cooperative, in no acute distress   Head:    Normocephalic, without obvious abnormality, atraumatic   Eyes:            conjunctivae and sclerae normal, no   icterus, no pallor, corneas  clear, PERRLA   Neck:   No adenopathy, supple, trachea midline, no thyromegaly, no   carotid bruit, no JVD   Lungs:     Clear to auscultation,respirations regular, even and                  unlabored    Heart:    Regular rhythm and normal rate, normal S1 and S2, no            murmur, no gallop, no rub, no click   Abdomen:     Normal bowel sounds, no masses, no organomegaly, soft        non-tender, non-distended, no guarding, no rebound                No tenderness   Extremities:   Moves all extremities well, no edema, no cyanosis, no             redness   Lymph nodes:   No palpable adenopathy   Neurologic:   Cranial nerves 2 - 12 grossly intact, sensation intact, DTR       present and equal bilaterally          Pertinent  and/or Most Recent Results     LAB RESULTS:      Lab 11/16/24  0413 11/15/24  0431 11/14/24  0338 11/13/24  1844   WBC 5.51 5.95 6.71 8.98   HEMOGLOBIN 10.2* 10.5* 11.0* 13.1   HEMATOCRIT 32.3* 32.8* 35.1 40.3   PLATELETS 154 166 181 221   NEUTROS ABS 2.78 3.21 3.84 4.73   IMMATURE GRANS (ABS) 0.02 0.02 0.02 0.02   LYMPHS ABS 2.26 2.33 2.34 3.63*   MONOS ABS 0.35 0.32 0.42 0.45   EOS ABS 0.09 0.06 0.08 0.12   MCV 92.6 92.7 93.1 92.0         Lab 11/16/24  0413 11/15/24  0431 11/14/24  0338 11/13/24  1844   SODIUM 142 140 142 144   POTASSIUM 4.4 3.9 4.1 4.3   CHLORIDE 106 104 109*  106   CO2 30.0* 28.1 24.6 27.2   ANION GAP 6.0 7.9 8.4 10.8   BUN 5* 7 10 12   CREATININE 0.73 0.83 0.82 0.83   EGFR 102.9 88.2 89.5 88.2   GLUCOSE 76 73 81 94   CALCIUM 8.8 8.7 8.3* 9.6         Lab 11/13/24  1844   TOTAL PROTEIN 7.3   ALBUMIN 4.3   GLOBULIN 3.0   ALT (SGPT) 17   AST (SGOT) 18   BILIRUBIN 0.3   ALK PHOS 118*   LIPASE 1,038*                     Brief Urine Lab Results  (Last result in the past 365 days)        Color   Clarity   Blood   Leuk Est   Nitrite   Protein   CREAT   Urine HCG        11/13/24 1844 Yellow   Clear   Negative   Negative   Negative   Negative                 Microbiology Results (last 10 days)       ** No results found for the last 240 hours. **            CT Abdomen Pelvis With Contrast    Result Date: 11/13/2024  Impression: Impression: Acute interstitial edematous pancreatitis Electronically Signed: Bc Posadas  11/13/2024 8:57 PM EST  Workstation ID: [id number]                 Labs Pending at Discharge:      Procedures Performed           Consults:   Consults       Date and Time Order Name Status Description    11/13/2024  9:06 PM Hospitalist (on-call MD unless specified)                Discharge Details        Discharge Medications        Changes to Medications        Instructions Start Date   famotidine 40 MG tablet  Commonly known as: PEPCID  What changed: Another medication with the same name was removed. Continue taking this medication, and follow the directions you see here.   40 mg, Nightly             Continue These Medications        Instructions Start Date   acetaminophen 325 MG tablet  Commonly known as: TYLENOL   650 mg, Every 6 Hours PRN      colesevelam 625 MG tablet  Commonly known as: WELCHOL   625 mg, 2 Times Daily With Meals      HYDROcodone-acetaminophen 5-325 MG per tablet  Commonly known as: NORCO   1 tablet, Oral, Every 6 Hours PRN      hyoscyamine 0.125 MG SL tablet  Commonly known as: LEVSIN   0.125 mg, Every 6 Hours PRN      nortriptyline 10 MG  capsule  Commonly known as: PAMELOR   10 mg, Nightly      ondansetron 4 MG tablet  Commonly known as: ZOFRAN   4 mg, Oral, Every 8 Hours PRN      ondansetron ODT 4 MG disintegrating tablet  Commonly known as: ZOFRAN-ODT   4 mg, Every 8 Hours PRN      Zenpep 09018-77919 units capsule delayed-release particles  Generic drug: Pancrelipase (Lip-Prot-Amyl)   2 capsules, 3 Times Daily With Meals             Stop These Medications      prochlorperazine 10 MG tablet  Commonly known as: COMPAZINE              Allergies   Allergen Reactions    Roxicodone [Oxycodone Hcl] Itching and Nausea Only    Toradol [Ketorolac Tromethamine] Itching and Irritability    Gabapentin Nausea And Vomiting    Naproxen Nausea And Vomiting    Pregabalin Hives    Morphine Itching         Discharge Disposition: Home or Self Care    Diet:  Hospital:  Diet Order   Procedures    Diet: Regular/House; Texture: Soft to Chew (NDD 3); Soft to Chew: Chopped Meat; Fluid Consistency: Thin (IDDSI 0)         Discharge Activity:         CODE STATUS:  Code Status and Medical Interventions: CPR (Attempt to Resuscitate); Full Support   Ordered at: 11/13/24 2214     Code Status (Patient has no pulse and is not breathing):    CPR (Attempt to Resuscitate)     Medical Interventions (Patient has pulse or is breathing):    Full Support         Future Appointments   Date Time Provider Department Center   12/3/2024  3:45 PM Anita Blanco MD [facility]           Time spent on Discharge including face to face service:  >30 minutes    Signature: Electronically signed by Myron Mcnamara MD, 11/16/24, 10:45 EST.  [facility] Hospitalist Team      Electronically signed by Myron Mcnamara MD at 11/16/24 2667

## 2024-11-18 NOTE — OUTREACH NOTE
Call Center TCM Note      Flowsheet Row Responses   [facility] patient discharged from? [facility]   Does the patient have one of the following disease processes/diagnoses(primary or secondary)? Other   TCM attempt successful? Yes   Call start time 1144   Call end time 1145   Discharge diagnosis Acute pancreatitis   Person spoke with today (if not patient) and relationship Son- Best Langley reviewed with patient/caregiver? Yes   Does the patient have all medications ordered at discharge? Yes   Prescription comments No concerns or questions noted   Is the patient taking all medications as directed (includes completed medication regime)? Yes   Comments Son will advise his mother to call and schedule fu appt.   Does the patient have an appointment with their PCP within 7-14 days of discharge? No   Nursing Interventions Routed TCM call to PCP office   Has home health visited the patient within 72 hours of discharge? N/A   Psychosocial issues? No   Did the patient receive a copy of their discharge instructions? Yes   Nursing interventions Reviewed instructions with patient   What is the patient's perception of their health status since discharge? Improving   Is the patient/caregiver able to teach back signs and symptoms related to disease process for when to call PCP? Yes   Is the patient/caregiver able to teach back signs and symptoms related to disease process for when to call 911? Yes   Is the patient/caregiver able to teach back the hierarchy of who to call/visit for symptoms/problems? PCP, Specialist, Home health nurse, Urgent Care, ED, 911 Yes   TCM call completed? Yes   Wrap up additional comments Son states patient is doing well. No concerns or questions noted.   Call end time 1145   Would this patient benefit from a Referral to Amb Social Work? No   Is the patient interested in additional calls from an ambulatory ? No            Christiane Mcgowan RN    11/18/2024, 11:45 EST

## 2024-11-27 ENCOUNTER — READMISSION MANAGEMENT (OUTPATIENT)
Dept: CALL CENTER | Facility: HOSPITAL | Age: 49
End: 2024-11-27
Payer: COMMERCIAL

## 2024-11-27 NOTE — OUTREACH NOTE
"Medical Week 2 Survey      Flowsheet Row Responses   [facility] patient discharged from? [facility]   Does the patient have one of the following disease processes/diagnoses(primary or secondary)? Other   Week 2 attempt successful? Yes   Call start time 1658   Discharge diagnosis Acute pancreatitis   Call end time 1700   Is the patient taking all medications as directed (includes completed medication regime)? Yes   Does the patient have a primary care provider?  Yes   Does the patient have an appointment with their PCP within 7 days of discharge? Greater than 7 days   Psychosocial issues? No   What is the patient's perception of their health status since discharge? Improving   Is the patient/caregiver able to teach back signs and symptoms related to disease process for when to call PCP? Yes   Is the patient/caregiver able to teach back signs and symptoms related to disease process for when to call 911? Yes   Is the patient/caregiver able to teach back the hierarchy of who to call/visit for symptoms/problems? PCP, Specialist, Home health nurse, Urgent Care, ED, 911 Yes   Additional teach back comments States she is doing \"ok\".  Denies any abdominal pain, nausea or vomitting.   Week 2 Call Completed? Yes   Graduated Yes   Graduated/Revoked comments Pt has f/u with PCP next week.   Call end time 1700            [facility] - Licensed Nurse  "

## 2024-11-30 PROBLEM — H31.002 CHORIORETINAL SCAR OF LEFT EYE: Status: ACTIVE | Noted: 2024-10-14

## 2024-11-30 PROBLEM — H52.03 HYPERMETROPIA, BILATERAL: Status: ACTIVE | Noted: 2024-10-14

## 2024-11-30 PROBLEM — H52.4 PRESBYOPIA: Status: ACTIVE | Noted: 2024-10-14

## 2024-11-30 NOTE — PATIENT INSTRUCTIONS
Health Maintenance Due   Topic Date Due    MAMMOGRAM  Never done    INFLUENZA VACCINE  07/01/2024    COVID-19 Vaccine (4 - 2024-25 season) 09/01/2024    12 hour fast for labs

## 2024-12-03 ENCOUNTER — OFFICE VISIT (OUTPATIENT)
Dept: FAMILY MEDICINE CLINIC | Facility: CLINIC | Age: 49
End: 2024-12-03
Payer: COMMERCIAL

## 2024-12-03 VITALS
HEART RATE: 84 BPM | DIASTOLIC BLOOD PRESSURE: 77 MMHG | TEMPERATURE: 97.4 F | WEIGHT: 199.6 LBS | OXYGEN SATURATION: 98 % | SYSTOLIC BLOOD PRESSURE: 110 MMHG | BODY MASS INDEX: 34.08 KG/M2 | HEIGHT: 64 IN

## 2024-12-03 DIAGNOSIS — F33.1 MAJOR DEPRESSIVE DISORDER, RECURRENT, MODERATE: ICD-10-CM

## 2024-12-03 DIAGNOSIS — G43.E09 CHRONIC MIGRAINE WITH AURA WITHOUT STATUS MIGRAINOSUS, NOT INTRACTABLE: ICD-10-CM

## 2024-12-03 DIAGNOSIS — E66.09 CLASS 1 OBESITY DUE TO EXCESS CALORIES WITH SERIOUS COMORBIDITY AND BODY MASS INDEX (BMI) OF 34.0 TO 34.9 IN ADULT: ICD-10-CM

## 2024-12-03 DIAGNOSIS — E03.9 HYPOTHYROIDISM, UNSPECIFIED TYPE: ICD-10-CM

## 2024-12-03 DIAGNOSIS — E78.5 HYPERLIPIDEMIA, UNSPECIFIED HYPERLIPIDEMIA TYPE: ICD-10-CM

## 2024-12-03 DIAGNOSIS — R56.9 CONVULSIONS, UNSPECIFIED CONVULSION TYPE: ICD-10-CM

## 2024-12-03 DIAGNOSIS — E66.811 CLASS 1 OBESITY DUE TO EXCESS CALORIES WITH SERIOUS COMORBIDITY AND BODY MASS INDEX (BMI) OF 34.0 TO 34.9 IN ADULT: ICD-10-CM

## 2024-12-03 DIAGNOSIS — K85.00 IDIOPATHIC ACUTE PANCREATITIS, UNSPECIFIED COMPLICATION STATUS: ICD-10-CM

## 2024-12-03 DIAGNOSIS — I10 PRIMARY HYPERTENSION: ICD-10-CM

## 2024-12-03 DIAGNOSIS — Z12.31 ENCOUNTER FOR SCREENING MAMMOGRAM FOR MALIGNANT NEOPLASM OF BREAST: Primary | ICD-10-CM

## 2024-12-03 DIAGNOSIS — F17.200 TOBACCO DEPENDENCE SYNDROME: ICD-10-CM

## 2024-12-03 DIAGNOSIS — G93.89 MASS OF BRAIN: ICD-10-CM

## 2024-12-03 PROCEDURE — 99214 OFFICE O/P EST MOD 30 MIN: CPT | Performed by: PREVENTIVE MEDICINE

## 2024-12-03 RX ORDER — DICYCLOMINE HCL 20 MG
20 TABLET ORAL EVERY 6 HOURS PRN
COMMUNITY
Start: 2024-11-06

## 2024-12-03 NOTE — PROGRESS NOTES
Subjective   Dianna Claros is a 48 y.o. female presents for   Chief Complaint   Patient presents with    Hypertension     6 month check up  [facility] declines vaccines       Health Maintenance Due   Topic Date Due    MAMMOGRAM  Never done       Hypertension       History of Present Illness  The patient is a 48-year-old female presenting today for a screening mammogram, tobacco dependence syndrome, mass of the brain, major depression, hypothyroidism, primary hypertension, hyperlipidemia, convulsions, chronic migraine, class 1 obesity due to excess calories with serious comorbidities, and idiopathic acute pancreatitis with unspecified complication status.    She has been managing her pancreatitis, which led to a 4-day hospital stay. Her lipase levels were over 1000. She reports loose bowel movements since her gallbladder removal but no dark or red stools, blood in urine, burning sensation during urination, unusual vaginal discharge, fever, chills, night sweats, or weight loss. She is under the care of a gastroenterologist as needed and is mindful of her saturated fat intake.    She has been experiencing hearing loss in one ear for the past 6 months and has not yet consulted an ENT specialist. She has not visited a dentist recently but has seen an eye doctor and uses contact lenses. She reports no difficulty swallowing, no blood in her sputum, and no wheezing.    She performs monthly breast self-examinations and is due for a mammogram. She is also due for a check-up for her brain mass.    Her mood and depression are stable. She has not noticed any increase in dry skin or hair loss. She has not had any more seizures. Her migraines are intermittent but manageable.    ALLERGIES  She is allergic to MORPHINE, TORADOL, GABAPENTIN, CELEXA, OXYCODONE, ROXICODONE.    Vitals:    12/03/24 1545 12/03/24 1546   BP: 110/76 110/77   BP Location: Left arm Right arm   Patient Position: Sitting Sitting   Cuff Size: Adult Adult   Pulse:  "84    Temp: 97.4 °F (36.3 °C)    TempSrc: Temporal    SpO2: 98%    Weight: 90.5 kg (199 lb 9.6 oz)    Height: 162.6 cm (64\")      Body mass index is 34.26 kg/m².    Current Outpatient Medications on File Prior to Visit   Medication Sig Dispense Refill    acetaminophen (TYLENOL) 325 MG tablet Take 2 tablets by mouth Every 6 (Six) Hours As Needed for Mild Pain.      colesevelam (WELCHOL) 625 MG tablet Take 1 tablet by mouth 2 (Two) Times a Day With Meals.      dicyclomine (BENTYL) 20 MG tablet Take 1 tablet by mouth Every 6 (Six) Hours As Needed. for pain      famotidine (PEPCID) 40 MG tablet Take 1 tablet by mouth Every Night.      hyoscyamine (LEVSIN) 0.125 MG SL tablet Place 1 tablet under the tongue Every 6 (Six) Hours As Needed for Cramping.      nortriptyline (PAMELOR) 10 MG capsule Take 1 capsule by mouth Every Night.      ondansetron (ZOFRAN) 4 MG tablet Take 1 tablet by mouth Every 8 (Eight) Hours As Needed for Nausea or Vomiting. 20 tablet 0    ondansetron ODT (ZOFRAN-ODT) 4 MG disintegrating tablet Place 1 tablet on the tongue Every 8 (Eight) Hours As Needed for Nausea or Vomiting.      Pancrelipase, Lip-Prot-Amyl, (Zenpep) 92606-91775 units capsule delayed-release particles Take 2 capsules by mouth 3 (Three) Times a Day With Meals.      [DISCONTINUED] HYDROcodone-acetaminophen (NORCO) 5-325 MG per tablet Take 1 tablet by mouth Every 6 (Six) Hours As Needed for Severe Pain. (Patient not taking: Reported on 12/3/2024) 12 tablet 0     No current facility-administered medications on file prior to visit.       The following portions of the patient's history were reviewed and updated as appropriate: allergies, current medications, past family history, past medical history, past social history, past surgical history, and problem list.    Review of Systems   Gastrointestinal:  Positive for abdominal pain.   Neurological:  Positive for headache. Negative for seizures.   Psychiatric/Behavioral:  Negative for " depressed mood.        Objective   Physical Exam  Vitals reviewed.   Constitutional:       General: She is not in acute distress.     Appearance: She is well-developed. She is obese. She is not ill-appearing or toxic-appearing.   HENT:      Head: Normocephalic and atraumatic.      Right Ear: Tympanic membrane, ear canal and external ear normal.      Left Ear: Tympanic membrane, ear canal and external ear normal.      Nose: Nose normal.      Mouth/Throat:      Mouth: Mucous membranes are moist.      Pharynx: No posterior oropharyngeal erythema.   Eyes:      Extraocular Movements: Extraocular movements intact.      Conjunctiva/sclera: Conjunctivae normal.      Pupils: Pupils are equal, round, and reactive to light.   Cardiovascular:      Rate and Rhythm: Normal rate and regular rhythm.      Heart sounds: Normal heart sounds.   Pulmonary:      Effort: Pulmonary effort is normal.      Breath sounds: Normal breath sounds.   Abdominal:      General: Bowel sounds are normal. There is no distension.      Palpations: Abdomen is soft. There is no mass.      Tenderness: There is abdominal tenderness. There is no right CVA tenderness or left CVA tenderness.   Musculoskeletal:         General: Normal range of motion.      Cervical back: Neck supple.      Right lower leg: No edema.      Left lower leg: No edema.   Skin:     General: Skin is warm.   Neurological:      General: No focal deficit present.      Mental Status: She is alert and oriented to person, place, and time.   Psychiatric:         Mood and Affect: Mood normal.         Behavior: Behavior normal.       Physical Exam  Lungs sound good.  Heart rate and rhythm are normal.    PHQ-9 Total Score:    Results  Laboratory Studies  Lipase levels were over 1000.         Assessment & Plan   Diagnoses and all orders for this visit:    1. Encounter for screening mammogram for malignant neoplasm of breast (Primary)  -     Mammo Screening Digital Tomosynthesis Bilateral With CAD;  Future    2. Tobacco dependence syndrome    3. Mass of brain  -     MRI Brain With & Without Contrast; Future  -     Ambulatory Referral to Neurosurgery    4. Major depressive disorder, recurrent, moderate  -     Cancel: Magnesium; Future  -     Cancel: Vitamin B12; Future  -     Vitamin B12; Future  -     Magnesium; Future  -     Magnesium  -     Vitamin B12    5. Hypothyroidism, unspecified type  -     Cancel: TSH Rfx On Abnormal To Free T4; Future  -     TSH Rfx On Abnormal To Free T4; Future  -     TSH Rfx On Abnormal To Free T4    6. Primary hypertension  -     Cancel: CBC Auto Differential; Future  -     Cancel: Comprehensive Metabolic Panel; Future  -     Comprehensive Metabolic Panel; Future  -     CBC Auto Differential; Future  -     CBC Auto Differential  -     Comprehensive Metabolic Panel    7. Hyperlipidemia, unspecified hyperlipidemia type  -     Cancel: Lipid Panel; Future  -     Lipid Panel; Future  -     Lipid Panel    8. Convulsions, unspecified convulsion type    9. Chronic migraine with aura without status migrainosus, not intractable    10. Class 1 obesity due to excess calories with serious comorbidity and body mass index (BMI) of 34.0 to 34.9 in adult    11. Idiopathic acute pancreatitis, unspecified complication status  -     Cancel: Lipase; Future  -     Lipase; Future  -     Lipase      Assessment & Plan  1. Idiopathic acute pancreatitis.  She spent 4 days in the ER due to pancreatitis. Lipase levels will be rechecked over the next couple of days. A 12-hour fasting lab test will be scheduled at [facility].    2. Hearing loss.  She reports hearing loss in one ear for about 6 months. A referral to an otolaryngologist will be made.    3. Mass of the brain.  An MRI will be ordered, and a referral to neurosurgeon, Dr. Almonte, will be arranged. The MRI should have been done in June 2024.    4. Major depression.  She reports her mood and depression have been okay. No changes in treatment were  discussed.    5. Primary hypertension.  Her blood pressure is under good control. She is advised to continue her current management.    6. Hyperlipidemia.  She is advised to watch her saturated fat intake.    7. Convulsions.  She reports no recent seizures. No changes in treatment were discussed.    8. Chronic migraine.  She reports that her migraines come and go but are manageable. No changes in treatment were discussed.    9. Class I obesity due to excess calories with serious comorbidities.  She is advised to monitor her portion sizes and increase her physical activity, particularly walking.    10. Health Maintenance.  She is due for a screening mammogram, which will be scheduled at [facility]. She is advised to continue monthly breast self-exams. She recently visited the eye doctor and has contacts. She is advised to visit the dentist to preserve her remaining teeth.    Follow-up  Return in 6 months for follow up.    Patient Instructions     Health Maintenance Due   Topic Date Due    MAMMOGRAM  Never done    INFLUENZA VACCINE  07/01/2024    COVID-19 Vaccine (4 - 2024-25 season) 09/01/2024    12 hour fast for labs       Patient or patient representative verbalized consent for the use of Ambient Listening during the visit with  Anita Blanco MD for chart documentation. 12/3/2024  16:19 EST

## 2024-12-07 ENCOUNTER — APPOINTMENT (OUTPATIENT)
Dept: CT IMAGING | Facility: HOSPITAL | Age: 49
End: 2024-12-07
Payer: COMMERCIAL

## 2024-12-07 ENCOUNTER — HOSPITAL ENCOUNTER (EMERGENCY)
Facility: HOSPITAL | Age: 49
Discharge: HOME | End: 2024-12-07
Attending: EMERGENCY MEDICINE
Payer: COMMERCIAL

## 2024-12-07 VITALS
OXYGEN SATURATION: 98 % | WEIGHT: 199.8 LBS | SYSTOLIC BLOOD PRESSURE: 109 MMHG | TEMPERATURE: 97.6 F | RESPIRATION RATE: 18 BRPM | HEART RATE: 69 BPM | HEIGHT: 64 IN | DIASTOLIC BLOOD PRESSURE: 63 MMHG | BODY MASS INDEX: 34.11 KG/M2

## 2024-12-07 DIAGNOSIS — R10.9 ABDOMINAL PAIN, UNSPECIFIED ABDOMINAL LOCATION: Primary | ICD-10-CM

## 2024-12-07 LAB
ALBUMIN SERPL-MCNC: 4 G/DL (ref 3.5–5.2)
ALBUMIN/GLOB SERPL: 1.4 G/DL
ALP SERPL-CCNC: 106 U/L (ref 39–117)
ALT SERPL W P-5'-P-CCNC: 14 U/L (ref 1–33)
ANION GAP SERPL CALCULATED.3IONS-SCNC: 8.5 MMOL/L (ref 5–15)
AST SERPL-CCNC: 18 U/L (ref 1–32)
BASOPHILS # BLD AUTO: 0.02 10*3/MM3 (ref 0–0.2)
BASOPHILS NFR BLD AUTO: 0.2 % (ref 0–1.5)
BILIRUB SERPL-MCNC: 0.4 MG/DL (ref 0–1.2)
BILIRUB UR QL STRIP: NEGATIVE
BUN SERPL-MCNC: 8 MG/DL (ref 6–20)
BUN/CREAT SERPL: 9.4 (ref 7–25)
CALCIUM SPEC-SCNC: 9.6 MG/DL (ref 8.6–10.5)
CHLORIDE SERPL-SCNC: 106 MMOL/L (ref 98–107)
CLARITY UR: CLEAR
CO2 SERPL-SCNC: 26.5 MMOL/L (ref 22–29)
COLOR UR: YELLOW
CREAT SERPL-MCNC: 0.85 MG/DL (ref 0.57–1)
DEPRECATED RDW RBC AUTO: 42.9 FL (ref 37–54)
EGFRCR SERPLBLD CKD-EPI 2021: 85.7 ML/MIN/1.73
EOSINOPHIL # BLD AUTO: 0.12 10*3/MM3 (ref 0–0.4)
EOSINOPHIL NFR BLD AUTO: 1.4 % (ref 0.3–6.2)
ERYTHROCYTE [DISTWIDTH] IN BLOOD BY AUTOMATED COUNT: 12.8 % (ref 12.3–15.4)
GLOBULIN UR ELPH-MCNC: 2.9 GM/DL
GLUCOSE SERPL-MCNC: 94 MG/DL (ref 65–99)
GLUCOSE UR STRIP-MCNC: NEGATIVE MG/DL
HCT VFR BLD AUTO: 39.3 % (ref 34–46.6)
HGB BLD-MCNC: 12.6 G/DL (ref 12–15.9)
HGB UR QL STRIP.AUTO: NEGATIVE
IMM GRANULOCYTES # BLD AUTO: 0.03 10*3/MM3 (ref 0–0.05)
IMM GRANULOCYTES NFR BLD AUTO: 0.4 % (ref 0–0.5)
KETONES UR QL STRIP: NEGATIVE
LEUKOCYTE ESTERASE UR QL STRIP.AUTO: NEGATIVE
LIPASE SERPL-CCNC: 47 U/L (ref 13–60)
LYMPHOCYTES # BLD AUTO: 3.68 10*3/MM3 (ref 0.7–3.1)
LYMPHOCYTES NFR BLD AUTO: 43 % (ref 19.6–45.3)
MCH RBC QN AUTO: 29.4 PG (ref 26.6–33)
MCHC RBC AUTO-ENTMCNC: 32.1 G/DL (ref 31.5–35.7)
MCV RBC AUTO: 91.6 FL (ref 79–97)
MONOCYTES # BLD AUTO: 0.55 10*3/MM3 (ref 0.1–0.9)
MONOCYTES NFR BLD AUTO: 6.4 % (ref 5–12)
NEUTROPHILS NFR BLD AUTO: 4.15 10*3/MM3 (ref 1.7–7)
NEUTROPHILS NFR BLD AUTO: 48.6 % (ref 42.7–76)
NITRITE UR QL STRIP: NEGATIVE
NRBC BLD AUTO-RTO: 0 /100 WBC (ref 0–0.2)
PH UR STRIP.AUTO: 7.5 [PH] (ref 5–8)
PLATELET # BLD AUTO: 221 10*3/MM3 (ref 140–450)
PMV BLD AUTO: 10.4 FL (ref 6–12)
POTASSIUM SERPL-SCNC: 4 MMOL/L (ref 3.5–5.2)
PROT SERPL-MCNC: 6.9 G/DL (ref 6–8.5)
PROT UR QL STRIP: NEGATIVE
RBC # BLD AUTO: 4.29 10*6/MM3 (ref 3.77–5.28)
SODIUM SERPL-SCNC: 141 MMOL/L (ref 136–145)
SP GR UR STRIP: 1.01 (ref 1–1.03)
UROBILINOGEN UR QL STRIP: NORMAL
WBC NRBC COR # BLD AUTO: 8.55 10*3/MM3 (ref 3.4–10.8)

## 2024-12-07 PROCEDURE — 25010000002 HYDROMORPHONE 1 MG/ML SOLUTION: Performed by: EMERGENCY MEDICINE

## 2024-12-07 PROCEDURE — 80053 COMPREHEN METABOLIC PANEL: CPT | Performed by: EMERGENCY MEDICINE

## 2024-12-07 PROCEDURE — 83690 ASSAY OF LIPASE: CPT | Performed by: EMERGENCY MEDICINE

## 2024-12-07 PROCEDURE — 96374 THER/PROPH/DIAG INJ IV PUSH: CPT

## 2024-12-07 PROCEDURE — 99284 EMERGENCY DEPT VISIT MOD MDM: CPT

## 2024-12-07 PROCEDURE — 96375 TX/PRO/DX INJ NEW DRUG ADDON: CPT

## 2024-12-07 PROCEDURE — 85025 COMPLETE CBC W/AUTO DIFF WBC: CPT | Performed by: EMERGENCY MEDICINE

## 2024-12-07 PROCEDURE — 74176 CT ABD & PELVIS W/O CONTRAST: CPT

## 2024-12-07 PROCEDURE — 25010000002 ONDANSETRON PER 1 MG: Performed by: EMERGENCY MEDICINE

## 2024-12-07 PROCEDURE — 81003 URINALYSIS AUTO W/O SCOPE: CPT | Performed by: EMERGENCY MEDICINE

## 2024-12-07 RX ORDER — ONDANSETRON 2 MG/ML
4 INJECTION INTRAMUSCULAR; INTRAVENOUS ONCE
Status: COMPLETED | OUTPATIENT
Start: 2024-12-07 | End: 2024-12-07

## 2024-12-07 RX ORDER — SODIUM CHLORIDE 0.9 % (FLUSH) 0.9 %
10 SYRINGE (ML) INJECTION AS NEEDED
Status: DISCONTINUED | OUTPATIENT
Start: 2024-12-07 | End: 2024-12-08 | Stop reason: HOSPADM

## 2024-12-07 RX ADMIN — ONDANSETRON 4 MG: 2 INJECTION INTRAMUSCULAR; INTRAVENOUS at 20:55

## 2024-12-07 RX ADMIN — HYDROMORPHONE HYDROCHLORIDE 0.25 MG: 1 INJECTION, SOLUTION INTRAMUSCULAR; INTRAVENOUS; SUBCUTANEOUS at 20:55

## 2024-12-08 ENCOUNTER — TELEPHONE (OUTPATIENT)
Dept: FAMILY MEDICINE CLINIC | Facility: CLINIC | Age: 49
End: 2024-12-08
Payer: COMMERCIAL

## 2024-12-08 NOTE — TELEPHONE ENCOUNTER
Please call patient and make sure she is doing better from the abdominal pain that caused her to go to [facility] emergency room.

## 2024-12-08 NOTE — ED PROVIDER NOTES
Subjective   History of Present Illness  48-year-old female presents for left upper quadrant pain.  Going on for 2 days.  States she has been doing a clear liquid diet.  Had nausea and vomiting.  Pain worsening.  States she has a history of pancreatitis of unknown etiology and it feels similar.  Having normal bowel movements.  Review of Systems  See HPI.  Past Medical History:   Diagnosis Date    Endometriosis     Fibromyalgia     Hyperlipidemia     Hypertension        Allergies   Allergen Reactions    Roxicodone [Oxycodone Hcl] Itching and Nausea Only    Toradol [Ketorolac Tromethamine] Itching and Irritability    Gabapentin Nausea And Vomiting    Naproxen Nausea And Vomiting    Pregabalin Hives    Morphine Itching       Past Surgical History:   Procedure Laterality Date    ABDOMINAL SURGERY       SECTION      CHOLECYSTECTOMY WITH INTRAOPERATIVE CHOLANGIOGRAM N/A 2022    Procedure: CHOLECYSTECTOMY LAPAROSCOPIC INTRAOPERATIVE CHOLANGIOGRAM;  Surgeon: Robert Dale MD;  Location: [facility] MAIN OR;  Service: General;  Laterality: N/A;    COLONOSCOPY N/A 3/7/2023    Procedure: COLONOSCOPY with ileum and large intestine random biopsies R/O microscopic colitis;  Surgeon: NAVIN Schneider MD;  Location: [facility] ENDOSCOPY;  Service: Gastroenterology;  Laterality: N/A;  hemorrhoids    ENDOSCOPY N/A 2022    Procedure: ESOPHAGOGASTRODUODENOSCOPY WITH BIOPSY;  Surgeon: NAVIN Schneider MD;  Location: [facility] ENDOSCOPY;  Service: Gastroenterology;  Laterality: N/A;    HYSTERECTOMY      UPPER ENDOSCOPIC ULTRASOUND W/ FNA N/A 2023    Procedure: ESOPHAGOGASTRODUODENOSCOPY WITH ENDOSCOPIC ULTRASOUND;  Surgeon: Malik King MD;  Location: [facility] ENDOSCOPY;  Service: Gastroenterology;  Laterality: N/A;  POST: CHRONIC PANCREATITIS       Family History   Problem Relation Age of Onset    Cancer Mother     Heart disease Father     Pancreatic cancer Sister        Social History     Socioeconomic History    Marital  "status:    Tobacco Use    Smoking status: Every Day     Current packs/day: 1.00     Average packs/day: 1 pack/day for 34.5 years (34.5 ttl pk-yrs)     Types: Cigarettes     Start date: 6/5/1990    Smokeless tobacco: Never   Vaping Use    Vaping status: Never Used   Substance and Sexual Activity    Alcohol use: Never    Drug use: Never    Sexual activity: Defer           Objective   Physical Exam  No acute distress, mild left upper quadrant palpation without rebound or guarding, intact distal pulses, elevated BMI, no scleral icterus, no tachypnea or increased work of breathing, regular rate and rhythm, moist oral mucosa  Procedures           ED Course      /63   Pulse 69   Temp 97.6 °F (36.4 °C)   Resp 18   Ht 162.6 cm (64\")   Wt 90.6 kg (199 lb 12.8 oz)   SpO2 98%   BMI 34.30 kg/m²   Labs Reviewed   CBC WITH AUTO DIFFERENTIAL - Abnormal; Notable for the following components:       Result Value    Lymphocytes, Absolute 3.68 (*)     All other components within normal limits   LIPASE - Normal   URINALYSIS W/ MICROSCOPIC IF INDICATED (NO CULTURE) - Normal    Narrative:     Urine microscopic not indicated.   COMPREHENSIVE METABOLIC PANEL    Narrative:     GFR Normal >60  Chronic Kidney Disease <60  Kidney Failure <15     CBC AND DIFFERENTIAL    Narrative:     The following orders were created for panel order CBC & Differential.  Procedure                               Abnormality         Status                     ---------                               -----------         ------                     CBC Auto Differential[[id number]]        Abnormal            Final result                 Please view results for these tests on the individual orders.     CT Abdomen Pelvis Without Contrast   Final Result   Impression:   1.No acute intra-abdominal or intrapelvic process.   2.Ancillary findings as described above.            Electronically Signed: Farhana Tan MD     12/7/2024 10:18 PM EST     Workstation " ID: [id number]                                                         Medical Decision Making  Interpretation of CT negative for bowel obstruction.  See system for radiology interpretation.    Patient with reassuring lipase.  Reassuring CT.  No peritoneal signs on exam and reassuring vital signs.  Will DC.  Return ER precautions discussed.    Final diagnoses:   Abdominal pain, unspecified abdominal location       ED Disposition  ED Disposition       ED Disposition   Discharge    Condition   Stable    Comment   --               Anita Blanco MD  [address] [place] [address] [place] [facility]  [phone]    In 3 days           Medication List      No changes were made to your prescriptions during this visit.            Jimbo Eubanks MD  12/08/24 0109

## 2024-12-24 ENCOUNTER — APPOINTMENT (OUTPATIENT)
Dept: CT IMAGING | Facility: HOSPITAL | Age: 49
End: 2024-12-24
Payer: COMMERCIAL

## 2024-12-24 ENCOUNTER — HOSPITAL ENCOUNTER (INPATIENT)
Facility: HOSPITAL | Age: 49
LOS: 3 days | Discharge: HOME | End: 2024-12-27
Attending: STUDENT IN AN ORGANIZED HEALTH CARE EDUCATION/TRAINING PROGRAM | Admitting: FAMILY MEDICINE
Payer: COMMERCIAL

## 2024-12-24 DIAGNOSIS — K85.90 ACUTE PANCREATITIS, UNSPECIFIED COMPLICATION STATUS, UNSPECIFIED PANCREATITIS TYPE: Primary | ICD-10-CM

## 2024-12-24 DIAGNOSIS — K86.1 ACUTE ON CHRONIC PANCREATITIS: ICD-10-CM

## 2024-12-24 DIAGNOSIS — R11.0 NAUSEA WITHOUT VOMITING: ICD-10-CM

## 2024-12-24 DIAGNOSIS — R10.11 RUQ ABDOMINAL PAIN: ICD-10-CM

## 2024-12-24 DIAGNOSIS — K85.90 ACUTE ON CHRONIC PANCREATITIS: ICD-10-CM

## 2024-12-24 LAB
ALBUMIN SERPL-MCNC: 4.2 G/DL (ref 3.5–5.2)
ALBUMIN/GLOB SERPL: 1.6 G/DL
ALP SERPL-CCNC: 108 U/L (ref 39–117)
ALT SERPL W P-5'-P-CCNC: 14 U/L (ref 1–33)
ANION GAP SERPL CALCULATED.3IONS-SCNC: 11.9 MMOL/L (ref 5–15)
AST SERPL-CCNC: 18 U/L (ref 1–32)
BASOPHILS # BLD AUTO: 0.03 10*3/MM3 (ref 0–0.2)
BASOPHILS NFR BLD AUTO: 0.3 % (ref 0–1.5)
BILIRUB SERPL-MCNC: 0.3 MG/DL (ref 0–1.2)
BILIRUB UR QL STRIP: NEGATIVE
BUN SERPL-MCNC: 11 MG/DL (ref 6–20)
BUN/CREAT SERPL: 12.5 (ref 7–25)
CALCIUM SPEC-SCNC: 9.4 MG/DL (ref 8.6–10.5)
CHLORIDE SERPL-SCNC: 105 MMOL/L (ref 98–107)
CLARITY UR: CLEAR
CO2 SERPL-SCNC: 25.1 MMOL/L (ref 22–29)
COLOR UR: YELLOW
CREAT SERPL-MCNC: 0.88 MG/DL (ref 0.57–1)
DEPRECATED RDW RBC AUTO: 42.2 FL (ref 37–54)
EGFRCR SERPLBLD CKD-EPI 2021: 81.7 ML/MIN/1.73
EOSINOPHIL # BLD AUTO: 0.11 10*3/MM3 (ref 0–0.4)
EOSINOPHIL NFR BLD AUTO: 1.2 % (ref 0.3–6.2)
ERYTHROCYTE [DISTWIDTH] IN BLOOD BY AUTOMATED COUNT: 12.6 % (ref 12.3–15.4)
GLOBULIN UR ELPH-MCNC: 2.6 GM/DL
GLUCOSE SERPL-MCNC: 129 MG/DL (ref 65–99)
GLUCOSE UR STRIP-MCNC: NEGATIVE MG/DL
HCT VFR BLD AUTO: 40 % (ref 34–46.6)
HGB BLD-MCNC: 13.2 G/DL (ref 12–15.9)
HGB UR QL STRIP.AUTO: NEGATIVE
IMM GRANULOCYTES # BLD AUTO: 0.03 10*3/MM3 (ref 0–0.05)
IMM GRANULOCYTES NFR BLD AUTO: 0.3 % (ref 0–0.5)
KETONES UR QL STRIP: NEGATIVE
LEUKOCYTE ESTERASE UR QL STRIP.AUTO: NEGATIVE
LIPASE SERPL-CCNC: 1367 U/L (ref 13–60)
LYMPHOCYTES # BLD AUTO: 2.17 10*3/MM3 (ref 0.7–3.1)
LYMPHOCYTES NFR BLD AUTO: 24.5 % (ref 19.6–45.3)
MAGNESIUM SERPL-MCNC: 2 MG/DL (ref 1.6–2.6)
MCH RBC QN AUTO: 30.1 PG (ref 26.6–33)
MCHC RBC AUTO-ENTMCNC: 33 G/DL (ref 31.5–35.7)
MCV RBC AUTO: 91.3 FL (ref 79–97)
MONOCYTES # BLD AUTO: 0.54 10*3/MM3 (ref 0.1–0.9)
MONOCYTES NFR BLD AUTO: 6.1 % (ref 5–12)
NEUTROPHILS NFR BLD AUTO: 5.99 10*3/MM3 (ref 1.7–7)
NEUTROPHILS NFR BLD AUTO: 67.6 % (ref 42.7–76)
NITRITE UR QL STRIP: NEGATIVE
NRBC BLD AUTO-RTO: 0 /100 WBC (ref 0–0.2)
PH UR STRIP.AUTO: 5.5 [PH] (ref 5–8)
PLATELET # BLD AUTO: 209 10*3/MM3 (ref 140–450)
PMV BLD AUTO: 10.8 FL (ref 6–12)
POTASSIUM SERPL-SCNC: 3.9 MMOL/L (ref 3.5–5.2)
PROT SERPL-MCNC: 6.8 G/DL (ref 6–8.5)
PROT UR QL STRIP: NEGATIVE
RBC # BLD AUTO: 4.38 10*6/MM3 (ref 3.77–5.28)
SODIUM SERPL-SCNC: 142 MMOL/L (ref 136–145)
SP GR UR STRIP: 1.09 (ref 1–1.03)
UROBILINOGEN UR QL STRIP: ABNORMAL
WBC NRBC COR # BLD AUTO: 8.87 10*3/MM3 (ref 3.4–10.8)

## 2024-12-24 PROCEDURE — 74177 CT ABD & PELVIS W/CONTRAST: CPT

## 2024-12-24 PROCEDURE — 80053 COMPREHEN METABOLIC PANEL: CPT

## 2024-12-24 PROCEDURE — 83735 ASSAY OF MAGNESIUM: CPT

## 2024-12-24 PROCEDURE — 25010000002 ONDANSETRON PER 1 MG

## 2024-12-24 PROCEDURE — 25010000002 ONDANSETRON PER 1 MG: Performed by: NURSE PRACTITIONER

## 2024-12-24 PROCEDURE — 25810000003 SODIUM CHLORIDE 0.9 % SOLUTION

## 2024-12-24 PROCEDURE — 83690 ASSAY OF LIPASE: CPT

## 2024-12-24 PROCEDURE — 85025 COMPLETE CBC W/AUTO DIFF WBC: CPT

## 2024-12-24 PROCEDURE — 99285 EMERGENCY DEPT VISIT HI MDM: CPT

## 2024-12-24 PROCEDURE — 81003 URINALYSIS AUTO W/O SCOPE: CPT

## 2024-12-24 PROCEDURE — 25510000001 IOPAMIDOL PER 1 ML

## 2024-12-24 PROCEDURE — 25010000002 HYDROMORPHONE 1 MG/ML SOLUTION

## 2024-12-24 PROCEDURE — 36415 COLL VENOUS BLD VENIPUNCTURE: CPT

## 2024-12-24 PROCEDURE — 25810000003 SODIUM CHLORIDE 0.9 % SOLUTION: Performed by: NURSE PRACTITIONER

## 2024-12-24 PROCEDURE — 25010000002 HYDROMORPHONE PER 4 MG: Performed by: NURSE PRACTITIONER

## 2024-12-24 RX ORDER — ACETAMINOPHEN 325 MG/1
650 TABLET ORAL EVERY 6 HOURS PRN
Status: DISCONTINUED | OUTPATIENT
Start: 2024-12-24 | End: 2024-12-27 | Stop reason: HOSPADM

## 2024-12-24 RX ORDER — CHOLESTYRAMINE LIGHT 4 G/5.7G
1 POWDER, FOR SUSPENSION ORAL DAILY
Status: DISCONTINUED | OUTPATIENT
Start: 2024-12-25 | End: 2024-12-27 | Stop reason: HOSPADM

## 2024-12-24 RX ORDER — FAMOTIDINE 20 MG/1
40 TABLET, FILM COATED ORAL NIGHTLY
Status: DISCONTINUED | OUTPATIENT
Start: 2024-12-25 | End: 2024-12-27 | Stop reason: HOSPADM

## 2024-12-24 RX ORDER — HYDROMORPHONE HYDROCHLORIDE 1 MG/ML
0.5 INJECTION, SOLUTION INTRAMUSCULAR; INTRAVENOUS; SUBCUTANEOUS EVERY 4 HOURS PRN
Status: DISCONTINUED | OUTPATIENT
Start: 2024-12-24 | End: 2024-12-25

## 2024-12-24 RX ORDER — ONDANSETRON 2 MG/ML
4 INJECTION INTRAMUSCULAR; INTRAVENOUS EVERY 6 HOURS PRN
Status: DISCONTINUED | OUTPATIENT
Start: 2024-12-24 | End: 2024-12-27 | Stop reason: HOSPADM

## 2024-12-24 RX ORDER — ONDANSETRON 2 MG/ML
4 INJECTION INTRAMUSCULAR; INTRAVENOUS ONCE
Status: COMPLETED | OUTPATIENT
Start: 2024-12-24 | End: 2024-12-24

## 2024-12-24 RX ORDER — DICYCLOMINE HYDROCHLORIDE 10 MG/1
20 CAPSULE ORAL 4 TIMES DAILY
Status: DISCONTINUED | OUTPATIENT
Start: 2024-12-25 | End: 2024-12-27 | Stop reason: HOSPADM

## 2024-12-24 RX ORDER — SODIUM CHLORIDE 0.9 % (FLUSH) 0.9 %
10 SYRINGE (ML) INJECTION AS NEEDED
Status: DISCONTINUED | OUTPATIENT
Start: 2024-12-24 | End: 2024-12-27 | Stop reason: HOSPADM

## 2024-12-24 RX ORDER — SODIUM CHLORIDE 9 MG/ML
100 INJECTION, SOLUTION INTRAVENOUS CONTINUOUS
Status: DISPENSED | OUTPATIENT
Start: 2024-12-24 | End: 2024-12-25

## 2024-12-24 RX ORDER — IOPAMIDOL 755 MG/ML
100 INJECTION, SOLUTION INTRAVASCULAR
Status: COMPLETED | OUTPATIENT
Start: 2024-12-24 | End: 2024-12-24

## 2024-12-24 RX ORDER — NORTRIPTYLINE HYDROCHLORIDE 10 MG/1
10 CAPSULE ORAL NIGHTLY
Status: DISCONTINUED | OUTPATIENT
Start: 2024-12-25 | End: 2024-12-27 | Stop reason: HOSPADM

## 2024-12-24 RX ADMIN — SODIUM CHLORIDE 500 ML: 0.9 INJECTION, SOLUTION INTRAVENOUS at 17:17

## 2024-12-24 RX ADMIN — SODIUM CHLORIDE 100 ML/HR: 9 INJECTION, SOLUTION INTRAVENOUS at 20:57

## 2024-12-24 RX ADMIN — HYDROMORPHONE HYDROCHLORIDE 0.5 MG: 1 INJECTION, SOLUTION INTRAMUSCULAR; INTRAVENOUS; SUBCUTANEOUS at 16:37

## 2024-12-24 RX ADMIN — ONDANSETRON 4 MG: 2 INJECTION INTRAMUSCULAR; INTRAVENOUS at 16:36

## 2024-12-24 RX ADMIN — HYDROMORPHONE HYDROCHLORIDE 0.5 MG: 1 INJECTION, SOLUTION INTRAMUSCULAR; INTRAVENOUS; SUBCUTANEOUS at 18:42

## 2024-12-24 RX ADMIN — ONDANSETRON 4 MG: 2 INJECTION INTRAMUSCULAR; INTRAVENOUS at 20:56

## 2024-12-24 RX ADMIN — IOPAMIDOL 100 ML: 755 INJECTION, SOLUTION INTRAVENOUS at 17:05

## 2024-12-24 RX ADMIN — HYDROMORPHONE HYDROCHLORIDE 0.5 MG: 1 INJECTION, SOLUTION INTRAMUSCULAR; INTRAVENOUS; SUBCUTANEOUS at 20:56

## 2024-12-24 RX ADMIN — SODIUM CHLORIDE 500 ML: 9 INJECTION, SOLUTION INTRAVENOUS at 16:36

## 2024-12-24 NOTE — LETTER
December 27, 2024     Patient: Dianna Claros   YOB: 1975   Date of Visit: 12/24/2024       To Whom It May Concern:     Dianna Claros was admitted to the hospital from 12/24-12/27/2024.           Sincerely,        Dr. Benavides    CC: No Recipients

## 2024-12-24 NOTE — ED PROVIDER NOTES
Subjective   History of Present Illness  39-year-old female with history of endometriosis, hyperlipidemia, hypertension, chronic pancreatitis presents the ED with complaints of right upper quadrant abdominal pain that radiates to the right flank area started approximately an hour ago along with nausea without vomiting.  Patient reports she has a history of chronic pancreatitis and this feels similar to a pancreatic flare.  Does have a history of hysterectomy and cholecystectomy.  Denies UTI symptoms, fever, chest pain or shortness of breath, diarrhea or vomiting, cough or congestion    PCP: Bella VERONICA        Review of Systems   Constitutional:  Negative for fever.   Respiratory:  Negative for shortness of breath.    Cardiovascular:  Negative for chest pain.   Gastrointestinal:  Positive for abdominal pain and nausea. Negative for diarrhea and vomiting.   Genitourinary:  Negative for dysuria and frequency.   Musculoskeletal:  Positive for back pain.       Past Medical History:   Diagnosis Date    Endometriosis     Fibromyalgia     Hyperlipidemia     Hypertension        Allergies   Allergen Reactions    Roxicodone [Oxycodone Hcl] Itching and Nausea Only    Toradol [Ketorolac Tromethamine] Itching and Irritability    Gabapentin Nausea And Vomiting    Naproxen Nausea And Vomiting    Pregabalin Hives    Morphine Itching       Past Surgical History:   Procedure Laterality Date    ABDOMINAL SURGERY       SECTION      CHOLECYSTECTOMY WITH INTRAOPERATIVE CHOLANGIOGRAM N/A 2022    Procedure: CHOLECYSTECTOMY LAPAROSCOPIC INTRAOPERATIVE CHOLANGIOGRAM;  Surgeon: Robert Dale MD;  Location: [facility] MAIN OR;  Service: General;  Laterality: N/A;    COLONOSCOPY N/A 3/7/2023    Procedure: COLONOSCOPY with ileum and large intestine random biopsies R/O microscopic colitis;  Surgeon: NAVIN Schneider MD;  Location: [facility] ENDOSCOPY;  Service: Gastroenterology;  Laterality: N/A;  hemorrhoids    ENDOSCOPY N/A 2022     Procedure: ESOPHAGOGASTRODUODENOSCOPY WITH BIOPSY;  Surgeon: NAVIN Schneider MD;  Location: [facility] ENDOSCOPY;  Service: Gastroenterology;  Laterality: N/A;    HYSTERECTOMY      UPPER ENDOSCOPIC ULTRASOUND W/ FNA N/A 12/4/2023    Procedure: ESOPHAGOGASTRODUODENOSCOPY WITH ENDOSCOPIC ULTRASOUND;  Surgeon: Malik King MD;  Location: [facility] ENDOSCOPY;  Service: Gastroenterology;  Laterality: N/A;  POST: CHRONIC PANCREATITIS       Family History   Problem Relation Age of Onset    Cancer Mother     Heart disease Father     Pancreatic cancer Sister        Social History     Socioeconomic History    Marital status:    Tobacco Use    Smoking status: Every Day     Current packs/day: 1.00     Average packs/day: 1 pack/day for 34.6 years (34.6 ttl pk-yrs)     Types: Cigarettes     Start date: 6/5/1990    Smokeless tobacco: Never   Vaping Use    Vaping status: Never Used   Substance and Sexual Activity    Alcohol use: Never    Drug use: Never    Sexual activity: Defer           Objective   Physical Exam  Vitals reviewed.   HENT:      Head: Normocephalic.   Eyes:      Extraocular Movements: Extraocular movements intact.      Conjunctiva/sclera: Conjunctivae normal.   Cardiovascular:      Rate and Rhythm: Normal rate and regular rhythm.      Pulses: Normal pulses.      Heart sounds: Normal heart sounds.   Pulmonary:      Effort: Pulmonary effort is normal.      Breath sounds: Normal breath sounds. No wheezing.   Abdominal:      General: Bowel sounds are normal.      Palpations: Abdomen is soft.      Tenderness: There is abdominal tenderness. There is no right CVA tenderness or left CVA tenderness.      Comments: Mild tenderness to left upper quadrant, no significant tenderness to the right upper quadrant with palpation.  No peritonitis or rigidity   Musculoskeletal:         General: Normal range of motion.   Skin:     General: Skin is warm and dry.   Neurological:      Mental Status: She is alert and oriented to  "person, place, and time.   Psychiatric:         Mood and Affect: Mood normal.         Behavior: Behavior normal.         Procedures           ED Course  ED Course as of 12/24/24 1935   Tue Dec 24, 2024   1742 Multiple attempts to obtain UA but patient states she urinated PTA and is unable to go at this time [KB]   1917 Spoke  [KB]   1921 Spoke to Marizol IRAHETA with the hospitalist group who agreed to admit patient [KB]      ED Course User Index  [KB] Fabian Davonangelita, JAMIL      /70   Pulse 76   Temp 97.1 °F (36.2 °C) (Oral)   Resp 18   Ht 165.1 cm (65\")   Wt 90.1 kg (198 lb 10.2 oz)   SpO2 99%   BMI 33.05 kg/m²   Labs Reviewed   COMPREHENSIVE METABOLIC PANEL - Abnormal; Notable for the following components:       Result Value    Glucose 129 (*)     All other components within normal limits    Narrative:     GFR Categories in Chronic Kidney Disease (CKD)      GFR Category          GFR (mL/min/1.73)    Interpretation  G1                     90 or greater         Normal or high (1)  G2                      60-89                Mild decrease (1)  G3a                   45-59                Mild to moderate decrease  G3b                   30-44                Moderate to severe decrease  G4                    15-29                Severe decrease  G5                    14 or less           Kidney failure          (1)In the absence of evidence of kidney disease, neither GFR category G1 or G2 fulfill the criteria for CKD.    eGFR calculation 2021 CKD-EPI creatinine equation, which does not include race as a factor   LIPASE - Abnormal; Notable for the following components:    Lipase 1,367 (*)     All other components within normal limits   URINALYSIS W/ CULTURE IF INDICATED - Abnormal; Notable for the following components:    Specific Gravity, UA 1.090 (*)     All other components within normal limits    Narrative:     In absence of clinical symptoms, the presence of pyuria, bacteria, and/or nitrites on the urinalysis " result does not correlate with infection.  Urine microscopic not indicated.   MAGNESIUM - Normal   CBC WITH AUTO DIFFERENTIAL - Normal   CBC AND DIFFERENTIAL    Narrative:     The following orders were created for panel order CBC & Differential.  Procedure                               Abnormality         Status                     ---------                               -----------         ------                     CBC Auto Differential[[id number]]        Normal              Final result                 Please view results for these tests on the individual orders.     Medications   sodium chloride 0.9 % flush 10 mL (has no administration in time range)   HYDROmorphone (DILAUDID) injection 0.5 mg (0.5 mg Intravenous Given 12/24/24 1637)   ondansetron (ZOFRAN) injection 4 mg (4 mg Intravenous Given 12/24/24 1636)   sodium chloride 0.9 % bolus 500 mL (0 mL Intravenous Stopped 12/24/24 1717)   iopamidol (ISOVUE-370) 76 % injection 100 mL (100 mL Intravenous Given 12/24/24 1705)   sodium chloride 0.9 % bolus 500 mL (0 mL Intravenous Stopped 12/24/24 1811)   HYDROmorphone (DILAUDID) injection 0.5 mg (0.5 mg Intravenous Given 12/24/24 1842)     CT Abdomen Pelvis With Contrast    Result Date: 12/24/2024  Impression: Findings consistent with acute pancreatitis involving the pancreatic head. No evidence of necrosis, pancreatic ductal dilation or formation of fluid collection. Electronically Signed: Ivan Link DO  12/24/2024 5:13 PM EST  Workstation ID: [id number]                                                    Medical Decision Making  Patient was seen for the above complaints.  IV was established and blood work was obtained to assess lipase, infection, electrolyte abnormalities.  White blood cell count 8.87, hemoglobin 13.2, mag 2, electrolytes fairly within normal limits, lipase elevated at 1367.  UA was obtained, this was negative for UTI.  Abdominal CT was obtained and independently interpreted by the radiologist  as:  Findings consistent with acute pancreatitis involving the pancreatic head. No evidence of necrosis, pancreatic ductal dilation or formation of fluid collection.    Patient was given 1 L normal saline, Zofran and Dilaudid.  On reassessment reports that her nausea has subsided and still is having some intermittent pain.  Due to acute pancreatitis, patient to be admitted to the hospitalist service for further evaluation and management.  Discussed case with Marizol NEGRON with the hospitalist group who agrees to admit patient.  Discussed plan of care with patient who verbalized understanding was agreeable with plan of care at this time.    Based on the clinical findings at this time I anticipate the patient will require a 2 midnight stay    Problems Addressed:  Acute pancreatitis, unspecified complication status, unspecified pancreatitis type: complicated acute illness or injury  Nausea without vomiting: complicated acute illness or injury  RUQ abdominal pain: complicated acute illness or injury    Amount and/or Complexity of Data Reviewed  Labs: ordered. Decision-making details documented in ED Course.  Radiology: ordered and independent interpretation performed. Decision-making details documented in ED Course.    Risk  Prescription drug management.  Decision regarding hospitalization.        Final diagnoses:   Acute pancreatitis, unspecified complication status, unspecified pancreatitis type   RUQ abdominal pain   Nausea without vomiting       ED Disposition  ED Disposition       ED Disposition   Decision to Admit    Condition   --    Comment   Level of Care: Med/Surg [1]   Admitting Physician: LLANES ALVAREZ, CARLOS [[id number]]   Attending Physician: LLANES ALVAREZ, CARLOS [[id number]]                 No follow-up provider specified.       Medication List      No changes were made to your prescriptions during this visit.            aKe Peralta, JAMIL  12/24/24 1936

## 2024-12-25 ENCOUNTER — INPATIENT HOSPITAL (OUTPATIENT)
Dept: URBAN - METROPOLITAN AREA HOSPITAL 84 | Facility: HOSPITAL | Age: 49
End: 2024-12-25
Payer: COMMERCIAL

## 2024-12-25 DIAGNOSIS — R10.9 UNSPECIFIED ABDOMINAL PAIN: ICD-10-CM

## 2024-12-25 LAB
ANION GAP SERPL CALCULATED.3IONS-SCNC: 8.3 MMOL/L (ref 5–15)
BASOPHILS # BLD AUTO: 0.02 10*3/MM3 (ref 0–0.2)
BASOPHILS NFR BLD AUTO: 0.3 % (ref 0–1.5)
BUN SERPL-MCNC: 8 MG/DL (ref 6–20)
BUN/CREAT SERPL: 10.4 (ref 7–25)
CALCIUM SPEC-SCNC: 8.4 MG/DL (ref 8.6–10.5)
CHLORIDE SERPL-SCNC: 108 MMOL/L (ref 98–107)
CO2 SERPL-SCNC: 24.7 MMOL/L (ref 22–29)
CREAT SERPL-MCNC: 0.77 MG/DL (ref 0.57–1)
CRP SERPL-MCNC: 0.72 MG/DL (ref 0–0.5)
DEPRECATED RDW RBC AUTO: 43.6 FL (ref 37–54)
EGFRCR SERPLBLD CKD-EPI 2021: 95.9 ML/MIN/1.73
EOSINOPHIL # BLD AUTO: 0.09 10*3/MM3 (ref 0–0.4)
EOSINOPHIL NFR BLD AUTO: 1.4 % (ref 0.3–6.2)
ERYTHROCYTE [DISTWIDTH] IN BLOOD BY AUTOMATED COUNT: 12.8 % (ref 12.3–15.4)
GLUCOSE SERPL-MCNC: 76 MG/DL (ref 65–99)
HCT VFR BLD AUTO: 40 % (ref 34–46.6)
HGB BLD-MCNC: 12.5 G/DL (ref 12–15.9)
IMM GRANULOCYTES # BLD AUTO: 0.02 10*3/MM3 (ref 0–0.05)
IMM GRANULOCYTES NFR BLD AUTO: 0.3 % (ref 0–0.5)
LIPASE SERPL-CCNC: 856 U/L (ref 13–60)
LYMPHOCYTES # BLD AUTO: 2.17 10*3/MM3 (ref 0.7–3.1)
LYMPHOCYTES NFR BLD AUTO: 32.7 % (ref 19.6–45.3)
MCH RBC QN AUTO: 28.9 PG (ref 26.6–33)
MCHC RBC AUTO-ENTMCNC: 31.3 G/DL (ref 31.5–35.7)
MCV RBC AUTO: 92.6 FL (ref 79–97)
MONOCYTES # BLD AUTO: 0.45 10*3/MM3 (ref 0.1–0.9)
MONOCYTES NFR BLD AUTO: 6.8 % (ref 5–12)
NEUTROPHILS NFR BLD AUTO: 3.89 10*3/MM3 (ref 1.7–7)
NEUTROPHILS NFR BLD AUTO: 58.5 % (ref 42.7–76)
NRBC BLD AUTO-RTO: 0 /100 WBC (ref 0–0.2)
PLATELET # BLD AUTO: 208 10*3/MM3 (ref 140–450)
PMV BLD AUTO: 10.9 FL (ref 6–12)
POTASSIUM SERPL-SCNC: 3.9 MMOL/L (ref 3.5–5.2)
RBC # BLD AUTO: 4.32 10*6/MM3 (ref 3.77–5.28)
SODIUM SERPL-SCNC: 141 MMOL/L (ref 136–145)
WBC NRBC COR # BLD AUTO: 6.64 10*3/MM3 (ref 3.4–10.8)

## 2024-12-25 PROCEDURE — 80048 BASIC METABOLIC PNL TOTAL CA: CPT | Performed by: NURSE PRACTITIONER

## 2024-12-25 PROCEDURE — 25010000002 HYDROMORPHONE PER 4 MG: Performed by: INTERNAL MEDICINE

## 2024-12-25 PROCEDURE — 25010000002 HYDROMORPHONE PER 4 MG: Performed by: NURSE PRACTITIONER

## 2024-12-25 PROCEDURE — 85025 COMPLETE CBC W/AUTO DIFF WBC: CPT | Performed by: NURSE PRACTITIONER

## 2024-12-25 PROCEDURE — 99221 1ST HOSP IP/OBS SF/LOW 40: CPT

## 2024-12-25 PROCEDURE — 83690 ASSAY OF LIPASE: CPT | Performed by: INTERNAL MEDICINE

## 2024-12-25 PROCEDURE — 25810000003 SODIUM CHLORIDE 0.9 % SOLUTION: Performed by: NURSE PRACTITIONER

## 2024-12-25 PROCEDURE — 86140 C-REACTIVE PROTEIN: CPT

## 2024-12-25 PROCEDURE — 25010000002 ONDANSETRON PER 1 MG: Performed by: NURSE PRACTITIONER

## 2024-12-25 RX ORDER — TRAMADOL HYDROCHLORIDE 50 MG/1
50 TABLET ORAL EVERY 6 HOURS PRN
Status: DISCONTINUED | OUTPATIENT
Start: 2024-12-25 | End: 2024-12-25

## 2024-12-25 RX ORDER — HYDROMORPHONE HYDROCHLORIDE 1 MG/ML
0.5 INJECTION, SOLUTION INTRAMUSCULAR; INTRAVENOUS; SUBCUTANEOUS
Status: DISCONTINUED | OUTPATIENT
Start: 2024-12-25 | End: 2024-12-27 | Stop reason: HOSPADM

## 2024-12-25 RX ADMIN — ONDANSETRON 4 MG: 2 INJECTION INTRAMUSCULAR; INTRAVENOUS at 22:02

## 2024-12-25 RX ADMIN — HYDROMORPHONE HYDROCHLORIDE 0.5 MG: 1 INJECTION, SOLUTION INTRAMUSCULAR; INTRAVENOUS; SUBCUTANEOUS at 15:57

## 2024-12-25 RX ADMIN — HYDROMORPHONE HYDROCHLORIDE 0.5 MG: 1 INJECTION, SOLUTION INTRAMUSCULAR; INTRAVENOUS; SUBCUTANEOUS at 11:39

## 2024-12-25 RX ADMIN — SODIUM CHLORIDE 100 ML/HR: 9 INJECTION, SOLUTION INTRAVENOUS at 13:00

## 2024-12-25 RX ADMIN — TRAMADOL HYDROCHLORIDE 50 MG: 50 TABLET, COATED ORAL at 11:09

## 2024-12-25 RX ADMIN — FAMOTIDINE 40 MG: 20 TABLET, FILM COATED ORAL at 21:15

## 2024-12-25 RX ADMIN — DICYCLOMINE HYDROCHLORIDE 20 MG: 10 CAPSULE ORAL at 11:39

## 2024-12-25 RX ADMIN — SODIUM CHLORIDE 100 ML/HR: 9 INJECTION, SOLUTION INTRAVENOUS at 05:03

## 2024-12-25 RX ADMIN — HYDROMORPHONE HYDROCHLORIDE 0.5 MG: 1 INJECTION, SOLUTION INTRAMUSCULAR; INTRAVENOUS; SUBCUTANEOUS at 22:02

## 2024-12-25 RX ADMIN — DICYCLOMINE HYDROCHLORIDE 20 MG: 10 CAPSULE ORAL at 21:15

## 2024-12-25 RX ADMIN — SODIUM CHLORIDE 100 ML/HR: 9 INJECTION, SOLUTION INTRAVENOUS at 08:45

## 2024-12-25 RX ADMIN — HYDROMORPHONE HYDROCHLORIDE 0.5 MG: 1 INJECTION, SOLUTION INTRAMUSCULAR; INTRAVENOUS; SUBCUTANEOUS at 08:40

## 2024-12-25 RX ADMIN — ONDANSETRON 4 MG: 2 INJECTION INTRAMUSCULAR; INTRAVENOUS at 05:03

## 2024-12-25 RX ADMIN — ONDANSETRON 4 MG: 2 INJECTION INTRAMUSCULAR; INTRAVENOUS at 11:39

## 2024-12-25 RX ADMIN — HYDROMORPHONE HYDROCHLORIDE 0.5 MG: 1 INJECTION, SOLUTION INTRAMUSCULAR; INTRAVENOUS; SUBCUTANEOUS at 19:05

## 2024-12-25 RX ADMIN — NORTRIPTYLINE HYDROCHLORIDE 10 MG: 10 CAPSULE ORAL at 21:15

## 2024-12-25 RX ADMIN — HYDROMORPHONE HYDROCHLORIDE 0.5 MG: 1 INJECTION, SOLUTION INTRAMUSCULAR; INTRAVENOUS; SUBCUTANEOUS at 05:02

## 2024-12-25 RX ADMIN — HYDROMORPHONE HYDROCHLORIDE 0.5 MG: 1 INJECTION, SOLUTION INTRAMUSCULAR; INTRAVENOUS; SUBCUTANEOUS at 00:58

## 2024-12-25 NOTE — H&P
[facility] Medicine Services  History & Physical    Patient Name: Dianna Claros  : 1975  MRN: [id number]  Primary Care Physician:  Anita Blanco MD  Date of admission: 2024  Date and Time of Service: 2024 at 2020    Subjective      Chief Complaint: abdominal pain    History of Present Illness: Dianna Claros is a very pleasant  49 y.o. female with a CMH of depression, fibromyalgia, irritable bowel syndrome, hypothyroidism, hypertension, hyperlipidemia, obesity BMI 33 and recurrent acute episodes of chronic pancreatitis status postcholecystectomy who presented to [facility] on 2024 with complaints of right upper quadrant pain that radiates to the right flank.  Pain started about an hour prior to arrival with nausea and no vomiting.  She reports her history of pancreatitis but states this feels like a flare.  She denies any fever, diarrhea, hematemesis, chest pain or shortness of breath.  Review of records shows she has had 13 either ED visits or admissions for abdominal pain or pancreatitis over this past year here.  He was recently seen 2-1/2 weeks ago in the emergency department for abdominal pain.  Lipase was not elevated at 47 and CT abdomen pelvis done at that time showed no acute findings.  He was last admitted here in 2024 to 2024 for abdominal pain acute pancreatitis lipase during that admission at 1038 and CT pelvis with contrast showing acute interstitial edematous pancreatitis.  She was admitted here last December with pancreatitis and was seen by GI and underwent an EGD which showed no evidence of pancreas divisum but did show chronic pancreatitis.  Lipase in the ED today was 1367 and CT abdomen and pelvis with contrast shows findings consistent with acute pancreatitis involving the pancreatic head no evidence of necrosis pancreatic ductal dilation or formation of fluid collection.  He was given IV pain medications  started on IV fluid hydration and antiemetics and gastroenterology has been consulted.  She denies any alcohol use.      Review of Systems   HENT: Negative.     Eyes: Negative.    Respiratory: Negative.     Cardiovascular: Negative.    Gastrointestinal:  Positive for abdominal pain and nausea.   Endocrine: Negative.    Genitourinary: Negative.    Musculoskeletal: Negative.    Skin: Negative.    Allergic/Immunologic: Negative.    Neurological: Negative.    Hematological: Negative.    Psychiatric/Behavioral: Negative.     All other systems reviewed and are negative.      Personal History     Past Medical History:   Diagnosis Date    Endometriosis     Fibromyalgia     Hyperlipidemia     Hypertension        Past Surgical History:   Procedure Laterality Date    ABDOMINAL SURGERY       SECTION      CHOLECYSTECTOMY WITH INTRAOPERATIVE CHOLANGIOGRAM N/A 2022    Procedure: CHOLECYSTECTOMY LAPAROSCOPIC INTRAOPERATIVE CHOLANGIOGRAM;  Surgeon: Robert Dale MD;  Location: [facility] MAIN OR;  Service: General;  Laterality: N/A;    COLONOSCOPY N/A 3/7/2023    Procedure: COLONOSCOPY with ileum and large intestine random biopsies R/O microscopic colitis;  Surgeon: NAVIN Schneider MD;  Location: [facility] ENDOSCOPY;  Service: Gastroenterology;  Laterality: N/A;  hemorrhoids    ENDOSCOPY N/A 2022    Procedure: ESOPHAGOGASTRODUODENOSCOPY WITH BIOPSY;  Surgeon: NAVIN Schneider MD;  Location: [facility] ENDOSCOPY;  Service: Gastroenterology;  Laterality: N/A;    HYSTERECTOMY      UPPER ENDOSCOPIC ULTRASOUND W/ FNA N/A 2023    Procedure: ESOPHAGOGASTRODUODENOSCOPY WITH ENDOSCOPIC ULTRASOUND;  Surgeon: Malik King MD;  Location: [facility] ENDOSCOPY;  Service: Gastroenterology;  Laterality: N/A;  POST: CHRONIC PANCREATITIS       Family History: family history includes Cancer in her mother; Heart disease in her father; Pancreatic cancer in her sister. Otherwise pertinent FHx was reviewed and not pertinent to current  issue.    Social History:  reports that she has been smoking cigarettes. She started smoking about 34 years ago. She has a 34.6 pack-year smoking history. She has never used smokeless tobacco. She reports that she does not drink alcohol and does not use drugs.    Home Medications:  Prior to Admission Medications       Prescriptions Last Dose Informant Patient Reported? Taking?    acetaminophen (TYLENOL) 325 MG tablet 12/24/2024  Yes Yes    Take 2 tablets by mouth Every 6 (Six) Hours As Needed for Mild Pain.    colesevelam (WELCHOL) 625 MG tablet Past Week  Yes Yes    Take 1 tablet by mouth 2 (Two) Times a Day With Meals.    dicyclomine (BENTYL) 20 MG tablet 12/24/2024  Yes Yes    Take 1 tablet by mouth Every 6 (Six) Hours As Needed. for pain    famotidine (PEPCID) 40 MG tablet 12/23/2024  Yes Yes    Take 1 tablet by mouth Every Night.    hyoscyamine (LEVSIN) 0.125 MG SL tablet 12/24/2024  Yes Yes    Place 1 tablet under the tongue Every 6 (Six) Hours As Needed for Cramping.    nortriptyline (PAMELOR) 10 MG capsule 12/23/2024  Yes Yes    Take 1 capsule by mouth Every Night.    ondansetron (ZOFRAN) 4 MG tablet 12/24/2024  No Yes    Take 1 tablet by mouth Every 8 (Eight) Hours As Needed for Nausea or Vomiting.    ondansetron ODT (ZOFRAN-ODT) 4 MG disintegrating tablet 12/24/2024  Yes Yes    Place 1 tablet on the tongue Every 8 (Eight) Hours As Needed for Nausea or Vomiting.    Pancrelipase, Lip-Prot-Amyl, (Zenpep) 50398-24010 units capsule delayed-release particles 12/24/2024  Yes Yes    Take 2 capsules by mouth 3 (Three) Times a Day With Meals.              Allergies:  Allergies   Allergen Reactions    Roxicodone [Oxycodone Hcl] Itching and Nausea Only    Toradol [Ketorolac Tromethamine] Itching and Irritability    Gabapentin Nausea And Vomiting    Naproxen Nausea And Vomiting    Pregabalin Hives    Morphine Itching       Objective      Vitals:   Temp:  [97.1 °F (36.2 °C)-98.7 °F (37.1 °C)] 98.7 °F (37.1 °C)  Heart  Rate:  [63-81] 73  Resp:  [18-20] 20  BP: (105-136)/(68-71) 117/71  Body mass index is 33.05 kg/m².  Physical Exam  Vitals reviewed.   Constitutional:       Appearance: Normal appearance. She is obese.   HENT:      Head: Normocephalic and atraumatic.      Right Ear: External ear normal.      Left Ear: External ear normal.      Nose: Nose normal.      Mouth/Throat:      Mouth: Mucous membranes are moist.   Eyes:      Extraocular Movements: Extraocular movements intact.   Cardiovascular:      Rate and Rhythm: Normal rate and regular rhythm.      Pulses: Normal pulses.      Heart sounds: Normal heart sounds.   Pulmonary:      Effort: Pulmonary effort is normal.      Breath sounds: Normal breath sounds.   Abdominal:      Palpations: Abdomen is soft.   Genitourinary:     Comments: deferred  Musculoskeletal:         General: Normal range of motion.      Cervical back: Normal range of motion and neck supple.   Skin:     General: Skin is warm and dry.   Neurological:      General: No focal deficit present.      Mental Status: She is alert and oriented to person, place, and time.   Psychiatric:         Mood and Affect: Mood normal.         Behavior: Behavior normal.         Thought Content: Thought content normal.         Judgment: Judgment normal.         Diagnostic Data:  Lab Results (last 24 hours)       Procedure Component Value Units Date/Time    Urinalysis With Culture If Indicated - Urine, Clean Catch [[id number]]  (Abnormal) Collected: 12/24/24 1811    Specimen: Urine, Clean Catch Updated: 12/24/24 1816     Color, UA Yellow     Appearance, UA Clear     pH, UA 5.5     Specific Gravity, UA 1.090     Glucose, UA Negative     Ketones, UA Negative     Bilirubin, UA Negative     Blood, UA Negative     Protein, UA Negative     Leuk Esterase, UA Negative     Nitrite, UA Negative     Urobilinogen, UA 0.2 E.U./dL    Narrative:      In absence of clinical symptoms, the presence of pyuria, bacteria, and/or nitrites on the  urinalysis result does not correlate with infection.  Urine microscopic not indicated.    Lipase [[id number]]  (Abnormal) Collected: 12/24/24 1630    Specimen: Blood from Arm, Left Updated: 12/24/24 1707     Lipase 1,367 U/L     Comprehensive Metabolic Panel [[id number]]  (Abnormal) Collected: 12/24/24 1630    Specimen: Blood from Arm, Left Updated: 12/24/24 1655     Glucose 129 mg/dL      BUN 11 mg/dL      Creatinine 0.88 mg/dL      Sodium 142 mmol/L      Potassium 3.9 mmol/L      Chloride 105 mmol/L      CO2 25.1 mmol/L      Calcium 9.4 mg/dL      Total Protein 6.8 g/dL      Albumin 4.2 g/dL      ALT (SGPT) 14 U/L      AST (SGOT) 18 U/L      Alkaline Phosphatase 108 U/L      Total Bilirubin 0.3 mg/dL      Globulin 2.6 gm/dL      A/G Ratio 1.6 g/dL      BUN/Creatinine Ratio 12.5     Anion Gap 11.9 mmol/L      eGFR 81.7 mL/min/1.73     Narrative:      GFR Categories in Chronic Kidney Disease (CKD)      GFR Category          GFR (mL/min/1.73)    Interpretation  G1                     90 or greater         Normal or high (1)  G2                      60-89                Mild decrease (1)  G3a                   45-59                Mild to moderate decrease  G3b                   30-44                Moderate to severe decrease  G4                    15-29                Severe decrease  G5                    14 or less           Kidney failure          (1)In the absence of evidence of kidney disease, neither GFR category G1 or G2 fulfill the criteria for CKD.    eGFR calculation 2021 CKD-EPI creatinine equation, which does not include race as a factor    Magnesium [[id number]]  (Normal) Collected: 12/24/24 1630    Specimen: Blood from Arm, Left Updated: 12/24/24 1655     Magnesium 2.0 mg/dL     CBC & Differential [[id number]]  (Normal) Collected: 12/24/24 1630    Specimen: Blood from Arm, Left Updated: 12/24/24 1635    Narrative:      The following orders were created for panel order CBC & Differential.  Procedure                                Abnormality         Status                     ---------                               -----------         ------                     CBC Auto Differential[[id number]]        Normal              Final result                 Please view results for these tests on the individual orders.    CBC Auto Differential [[id number]]  (Normal) Collected: 12/24/24 1630    Specimen: Blood from Arm, Left Updated: 12/24/24 1635     WBC 8.87 10*3/mm3      RBC 4.38 10*6/mm3      Hemoglobin 13.2 g/dL      Hematocrit 40.0 %      MCV 91.3 fL      MCH 30.1 pg      MCHC 33.0 g/dL      RDW 12.6 %      RDW-SD 42.2 fl      MPV 10.8 fL      Platelets 209 10*3/mm3      Neutrophil % 67.6 %      Lymphocyte % 24.5 %      Monocyte % 6.1 %      Eosinophil % 1.2 %      Basophil % 0.3 %      Immature Grans % 0.3 %      Neutrophils, Absolute 5.99 10*3/mm3      Lymphocytes, Absolute 2.17 10*3/mm3      Monocytes, Absolute 0.54 10*3/mm3      Eosinophils, Absolute 0.11 10*3/mm3      Basophils, Absolute 0.03 10*3/mm3      Immature Grans, Absolute 0.03 10*3/mm3      nRBC 0.0 /100 WBC              Imaging Results (Last 24 Hours)       Procedure Component Value Units Date/Time    CT Abdomen Pelvis With Contrast [[id number]] Collected: 12/24/24 1708     Updated: 12/24/24 1715    Narrative:      CT ABDOMEN PELVIS W CONTRAST    Date of Exam: 12/24/2024 5:04 PM EST    Indication: RUQ abd pain radiating to R flank, hx pancreatitis.    Comparison: 12/7/2024    Technique: Axial CT images were obtained of the abdomen and pelvis following the uneventful intravenous administration of iodinated contrast. Sagittal and coronal reconstructions were performed.  Automated exposure control and iterative reconstruction   methods were used.        Findings:  Visualized Chest:  The visualized lung bases and lower mediastinal structures are unremarkable.    Liver: Liver is normal in size and CT density. No focal lesions.    Gallbladder: Status post  cholecystectomy    Bile Ducts: No billiary dcutal dilation.    Spleen: Spleen is normal in size and CT density.    Pancreas: Edema and fat stranding surrounding the pancreatic head. No ductal dilation. No definite evidence of necrosis.    Adrenals: Adrenal glands are unremarkable.    Kidneys: Kidneys are normal in size. There are no stones or hydronephrosis.    Gastrointestinal: Unremarkable.    Bladder: The bladder is normal.    Pelvis:  No suspecious mass.    Peritoneum/Mesentery: No fluid collection, ascities, or free air.      Lymph Nodes: No lymphadenopathy.    Vasculature: Unremarkable    Abdominal Wall: Unremarkable    Bony Structures: No acute osseous abnormality      Impression:      Impression:  Findings consistent with acute pancreatitis involving the pancreatic head. No evidence of necrosis, pancreatic ductal dilation or formation of fluid collection.            Electronically Signed: Ivan Link DO    12/24/2024 5:13 PM EST    Workstation ID: [id number]              Assessment & Plan        This is a 49 y.o. female with:    Active and Resolved Problems  Active Hospital Problems    Diagnosis  POA    **Acute pancreatitis [K85.90]  Yes     Priority: High    Hypertension [I10]  Yes    Major depressive disorder, recurrent, moderate [F33.1]  Yes    Tobacco dependence syndrome [F17.200]  Yes    Irritable bowel syndrome [K58.9]  Yes    Hypothyroidism [E03.9]  Yes    Fibromyalgia [M79.7]  Yes      Resolved Hospital Problems   No resolved problems to display.       Acute on chronic pancreatitis, CT lipase 1367, n.p.o., normal saline at 100 cc/h, 4 mg IV Zofran for nausea, 0.5 mg Dilaudid every 4 hours as needed pain, gastroenterology consulted no signs of necrosis or infection    Hypertension, no current home antihypertensive meds continue to monitor will add as needed antihypertensives if needed    Major depressive disorder, on home nortriptyline, hold for n.p.o. status    Tobacco dependence, encourage  cessation declined nicotine patch    Irritable bowel syndrome, hold home WelChol, Bentyl, Pepcid, hyoscyamine and pancrelipase for n.p.o. status pending GI consultation    Resume, no home meds    Fibromyalgia, no home meds        VTE Prophylaxis:  No VTE prophylaxis order currently exists.        The patient desires to be as follows:    CODE STATUS:               Admission Status:  I believe this patient meets inpatient  status.    Expected Length of Stay: pending clinical course     PDMP and Medication Dispenses via Sidebar reviewed and consistent with patient reported medications.    I discussed the patient's findings and my recommendations with patient.      Signature:     This document has been electronically signed by JAMIL Jara on December 24, 2024 23:47 [facility] Hospitalist Team

## 2024-12-25 NOTE — PLAN OF CARE
Problem: Adult Inpatient Plan of Care  Goal: Plan of Care Review  Outcome: Progressing  Goal: Patient-Specific Goal (Individualized)  Outcome: Progressing  Goal: Absence of Hospital-Acquired Illness or Injury  Outcome: Progressing  Intervention: Identify and Manage Fall Risk  Recent Flowsheet Documentation  Taken 12/25/2024 0200 by Anaid New RN  Safety Promotion/Fall Prevention: safety round/check completed  Taken 12/25/2024 0000 by Anaid New RN  Safety Promotion/Fall Prevention: safety round/check completed  Taken 12/24/2024 2004 by Anaid New RN  Safety Promotion/Fall Prevention:   safety round/check completed   room organization consistent  Intervention: Prevent Skin Injury  Recent Flowsheet Documentation  Taken 12/24/2024 2004 by Anaid New RN  Body Position: position changed independently  Intervention: Prevent and Manage VTE (Venous Thromboembolism) Risk  Recent Flowsheet Documentation  Taken 12/24/2024 2004 by Anaid New RN  VTE Prevention/Management: SCDs (sequential compression devices) off  Goal: Optimal Comfort and Wellbeing  Outcome: Progressing  Intervention: Provide Person-Centered Care  Recent Flowsheet Documentation  Taken 12/24/2024 2004 by Anaid New RN  Trust Relationship/Rapport: care explained  Goal: Readiness for Transition of Care  Outcome: Progressing  Intervention: Mutually Develop Transition Plan  Recent Flowsheet Documentation  Taken 12/24/2024 2002 by Anaid New RN  Transportation Anticipated: family or friend will provide  Patient/Family Anticipated Services at Transition: none  Patient/Family Anticipates Transition to: home with family  Taken 12/24/2024 2001 by Anaid New RN  Equipment Currently Used at Home: none   Goal Outcome Evaluation:   Pt admitted to floor from ED last night,pt complained of severe abd pain caused by pancreatitis flare up,Iv dilaudid given due to strict NPO status,VSS,pt started on IV fluids,GI  consulted,call light within reach,cont plan of care

## 2024-12-25 NOTE — CONSULTS
GI CONSULT  NOTE:    Referring Provider:  Dr Benavides    Chief complaint: abd pain    Subjective .     History of present illness: Dianna Claros is a 49 y.o. female history of c diff, hypertension, hyperlipidemia, endometriosis, fibromyalgia and several episodes of recurrent acute pancreatitis, prior cholecystectomy presenting with abd pain. Several hospitalizations with abdominal pain and acute on chronic pancreatitis. Had cholecystectomy 6/2022. Had another bout of pancreatitis after this. ELLE and IgG4 were negative. Continues to smoke cigarettes. She does not drink alcohol. Triglycerides have been elevated ~200 range. EUS with moderate chronic panc. She had negative MRCP 1/24.  She is closely followed in our office and was last seen about 2 months ago.  At this time, nortriptyline was increased to 50 mg.  She had a pseudocyst on CT scan 5/2024 which was decreasing in size.    Patient reports beginning to have severe abdominal pain yesterday in her epigastric/right upper quadrant that feels similar to prior acute pancreatitis episodes.  She denies any specific trigger that set off the pain.  She does complain of nausea, but has not vomited.  She continues to smoke cigarettes, but is working on decreasing this.  She is currently smoking 1/2 pack/day.  She has diarrhea at baseline which is usually well-controlled with WelChol and Zenpep.  She states that her pain is currently a 5/10 on the pain scale.  She feels thirsty.  No new medications recently.    Endo History:  12/2023 EGD/EUS (Dr. King) - moderate chronic pancreatitis, no evidence of pancreatic divism, chronic gastritis, s/p krystyna. No evidence of autoimmune pancreatitis. Biopsy not performed d/t low yield  3/23 colonoscopy (Jennie) - prominent/mildy erythematous IC valve but normal TI, small nonbleeding internal hemorrhoids; bx acute and chronic ileal inflammation, no crypt abscesses or cryptitis. benign colon  5/2022 EGD by Dr. Schneider -mild  chronic gastritis negative for H pylori    Past Medical History:  Past Medical History:   Diagnosis Date    Endometriosis     Fibromyalgia     Hyperlipidemia     Hypertension        Past Surgical History:  Past Surgical History:   Procedure Laterality Date    ABDOMINAL SURGERY       SECTION      CHOLECYSTECTOMY WITH INTRAOPERATIVE CHOLANGIOGRAM N/A 2022    Procedure: CHOLECYSTECTOMY LAPAROSCOPIC INTRAOPERATIVE CHOLANGIOGRAM;  Surgeon: Robert Dale MD;  Location: [facility] MAIN OR;  Service: General;  Laterality: N/A;    COLONOSCOPY N/A 3/7/2023    Procedure: COLONOSCOPY with ileum and large intestine random biopsies R/O microscopic colitis;  Surgeon: NAVIN Schneider MD;  Location: [facility] ENDOSCOPY;  Service: Gastroenterology;  Laterality: N/A;  hemorrhoids    ENDOSCOPY N/A 2022    Procedure: ESOPHAGOGASTRODUODENOSCOPY WITH BIOPSY;  Surgeon: NAVIN Schneider MD;  Location: [facility] ENDOSCOPY;  Service: Gastroenterology;  Laterality: N/A;    HYSTERECTOMY      UPPER ENDOSCOPIC ULTRASOUND W/ FNA N/A 2023    Procedure: ESOPHAGOGASTRODUODENOSCOPY WITH ENDOSCOPIC ULTRASOUND;  Surgeon: Malik King MD;  Location: [facility] ENDOSCOPY;  Service: Gastroenterology;  Laterality: N/A;  POST: CHRONIC PANCREATITIS       Social History:  Social History     Tobacco Use    Smoking status: Every Day     Current packs/day: 1.00     Average packs/day: 1 pack/day for 34.6 years (34.6 ttl pk-yrs)     Types: Cigarettes     Start date: 1990    Smokeless tobacco: Never   Vaping Use    Vaping status: Never Used   Substance Use Topics    Alcohol use: Never    Drug use: Never       Family History:  Family History   Problem Relation Age of Onset    Cancer Mother     Heart disease Father     Pancreatic cancer Sister        Medications:  Medications Prior to Admission   Medication Sig Dispense Refill Last Dose/Taking    acetaminophen (TYLENOL) 325 MG tablet Take 2 tablets by mouth Every 6 (Six) Hours As Needed for Mild  Pain.   12/24/2024    colesevelam (WELCHOL) 625 MG tablet Take 1 tablet by mouth 2 (Two) Times a Day With Meals.   Past Week    dicyclomine (BENTYL) 20 MG tablet Take 1 tablet by mouth Every 6 (Six) Hours As Needed. for pain   12/24/2024    famotidine (PEPCID) 40 MG tablet Take 1 tablet by mouth Every Night.   12/23/2024    hyoscyamine (LEVSIN) 0.125 MG SL tablet Place 1 tablet under the tongue Every 6 (Six) Hours As Needed for Cramping.   12/24/2024    nortriptyline (PAMELOR) 10 MG capsule Take 1 capsule by mouth Every Night.   12/23/2024    ondansetron (ZOFRAN) 4 MG tablet Take 1 tablet by mouth Every 8 (Eight) Hours As Needed for Nausea or Vomiting. 20 tablet 0 12/24/2024    ondansetron ODT (ZOFRAN-ODT) 4 MG disintegrating tablet Place 1 tablet on the tongue Every 8 (Eight) Hours As Needed for Nausea or Vomiting.   12/24/2024    Pancrelipase, Lip-Prot-Amyl, (Zenpep) 27998-52370 units capsule delayed-release particles Take 2 capsules by mouth 3 (Three) Times a Day With Meals.   12/24/2024       Scheduled Meds:[Held by provider] cholestyramine light, 1 packet, Oral, Daily  [Held by provider] dicyclomine, 20 mg, Oral, 4x Daily  [Held by provider] famotidine, 40 mg, Oral, Nightly  [Held by provider] nortriptyline, 10 mg, Oral, Nightly  [Held by provider] pancrelipase (Lip-Prot-Amyl), 24,000 units of lipase, Oral, TID With Meals      Continuous Infusions:sodium chloride, 100 mL/hr, Last Rate: 100 mL/hr (12/25/24 0845)      PRN Meds:.  [Held by provider] acetaminophen    HYDROmorphone    [Held by provider] hyoscyamine    ondansetron    [COMPLETED] Insert Peripheral IV **AND** sodium chloride    ALLERGIES:  Roxicodone [oxycodone hcl], Toradol [ketorolac tromethamine], Gabapentin, Naproxen, Pregabalin, and Morphine    ROS:  The following systems were reviewed and negative;   Constitution:  No fevers, chills, no unintentional weight loss  Skin: no rash, no jaundice  Eyes:  No blurry vision, no eye pain  HENT:  No change  in hearing or smell  Resp:  No dyspnea or cough  CV:  No chest pain or palpitations  :  No dysuria, hematuria  Musculoskeletal:  No leg cramps or arthralgias  Neuro:  No tremor, no numbness  Psych:  No depression or confusion    Objective     Vital Signs:   Vitals:    12/24/24 2300 12/25/24 0100 12/25/24 0505 12/25/24 0743   BP:  126/71 96/74 99/57   BP Location:  Right arm Right arm Right arm   Patient Position:  Lying Lying Lying   Pulse:  63 64 69   Resp:  18 16 16   Temp: 98.2 °F (36.8 °C)  98.4 °F (36.9 °C) 97.5 °F (36.4 °C)   TempSrc: Oral  Oral Oral   SpO2:  95% 98%    Weight:       Height:           Physical Exam:     General Appearance:    Awake and alert, in no acute distress   Head:    Normocephalic, without obvious abnormality, atraumatic   Throat:   No oral lesions, no thrush, oral mucosa moist   Lungs:     Respirations regular, even and unlabored   Chest Wall:    No abnormalities observed   Abdomen:     Soft, mild epigastric tpp, no rebound or guarding, non-distended, no hepatosplenomegaly   Rectal:     Deferred   Extremities:   Moves all extremities, no edema, no cyanosis   Pulses:   Pulses palpable and equal bilaterally   Skin:   No rash, no jaundice, normal palpation       Neurologic:   Cranial nerves 2 - 12 grossly intact, no asterixis       Results Review:   I reviewed the patient's labs and imaging.  Results from last 7 days   Lab Units 12/25/24  0511 12/24/24  1630   WBC 10*3/mm3 6.64 8.87   HEMOGLOBIN g/dL 12.5 13.2   PLATELETS 10*3/mm3 208 209     Results from last 7 days   Lab Units 12/25/24  0511 12/24/24  1630   SODIUM mmol/L 141 142   POTASSIUM mmol/L 3.9 3.9   CHLORIDE mmol/L 108* 105   CO2 mmol/L 24.7 25.1   BUN mg/dL 8 11   CREATININE mg/dL 0.77 0.88   GLUCOSE mg/dL 76 129*   ALBUMIN g/dL  --  4.2   BILIRUBIN mg/dL  --  0.3   ALK PHOS U/L  --  108   AST (SGOT) U/L  --  18   ALT (SGPT) U/L  --  14   MAGNESIUM mg/dL  --  2.0   LIPASE U/L 856* 1,367*     Estimated Creatinine Clearance:  "100.1 mL/min (by C-G formula based on SCr of 0.77 mg/dL).  No results found for: \"HGBA1C\"      Infection     UA    Results from last 7 days   Lab Units 12/24/24  1811   NITRITE UA  Negative     Microbiology Results (last 10 days)       ** No results found for the last 240 hours. **          Imaging Results (Last 72 Hours)       Procedure Component Value Units Date/Time    CT Abdomen Pelvis With Contrast [[id number]] Collected: 12/24/24 1708     Updated: 12/24/24 1715    Narrative:      CT ABDOMEN PELVIS W CONTRAST    Date of Exam: 12/24/2024 5:04 PM EST    Indication: RUQ abd pain radiating to R flank, hx pancreatitis.    Comparison: 12/7/2024    Technique: Axial CT images were obtained of the abdomen and pelvis following the uneventful intravenous administration of iodinated contrast. Sagittal and coronal reconstructions were performed.  Automated exposure control and iterative reconstruction   methods were used.        Findings:  Visualized Chest:  The visualized lung bases and lower mediastinal structures are unremarkable.    Liver: Liver is normal in size and CT density. No focal lesions.    Gallbladder: Status post cholecystectomy    Bile Ducts: No billiary dcutal dilation.    Spleen: Spleen is normal in size and CT density.    Pancreas: Edema and fat stranding surrounding the pancreatic head. No ductal dilation. No definite evidence of necrosis.    Adrenals: Adrenal glands are unremarkable.    Kidneys: Kidneys are normal in size. There are no stones or hydronephrosis.    Gastrointestinal: Unremarkable.    Bladder: The bladder is normal.    Pelvis:  No suspecious mass.    Peritoneum/Mesentery: No fluid collection, ascities, or free air.      Lymph Nodes: No lymphadenopathy.    Vasculature: Unremarkable    Abdominal Wall: Unremarkable    Bony Structures: No acute osseous abnormality      Impression:      Impression:  Findings consistent with acute pancreatitis involving the pancreatic head. No evidence of " necrosis, pancreatic ductal dilation or formation of fluid collection.            Electronically Signed: Ivan Nikoaly,     12/24/2024 5:13 PM EST    Workstation ID: [id number]            ASSESSMENT AND PLAN:    49-year-old female presents to the hospital with acute on chronic pancreatitis.  Multiple hospitalizations/ER visits over the last year with abdominal pain.  She has had negative autoimmune workup, EUS showed chronic pancreatitis.  She continues to smoke cigarettes.    -Acute on chronic pancreatitis  -Epigastric/RUQ abdominal pain  -Elevated lipase  -Tobacco use disorder  -Chronic diarrhea  -History of cholecystectomy    Plan  CT shows acute pancreatitis involving the pancreas head without fluid collection or necrosis.  Her lipase is 856, down from around 1300 yesterday.  Check CRP.  Continue supportive care with IV fluids for hydration, analgesics, antiemetics as needed.  Okay for clear liquid diet as tolerated.  Will add p.o. pain medications in an effort to decrease IV pain medication use.  If pain worsens, decrease back to strict n.p.o. diet.  Continue to trend labs.  LFTs are normal.  CBC unremarkable.  Resume pancreas enzymes when she resumes a diet.  She has a history of chronic diarrhea with negative random colon biopsies. 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  Well-controlled with use of WelChol and Zenpep. Ok to resume once she is eating again.   Discussed smoking cessation.  She is working on decreasing this.  Continue supportive care.    I discussed the patients findings and my recommendations with the patient.    We appreciate the referral    Electronically signed by JAMIL Maldonado, 12/25/24, 10:56 AM EST.

## 2024-12-25 NOTE — PROGRESS NOTES
[facility] MEDICINE SERVICE  DAILY PROGRESS NOTE    NAME: Dianna Claros  : 1975  MRN: [id number]      LOS: 1 day     PROVIDER OF SERVICE: Steph Benavides MD    Chief Complaint: Acute pancreatitis    Subjective:     Interval History:  History taken from: patient    Complaining of abdominal pain    Review of Systems:   Review of Systems   All other systems reviewed and are negative.      Objective:     Vital Signs  Temp:  [97.1 °F (36.2 °C)-98.7 °F (37.1 °C)] 97.5 °F (36.4 °C)  Heart Rate:  [63-81] 69  Resp:  [16-20] 16  BP: ()/(57-74) 99/57   Body mass index is 33.05 kg/m².    Physical Exam  Physical Exam  Constitutional:       Appearance: Normal appearance.   HENT:      Head: Normocephalic and atraumatic.      Nose: Nose normal.      Mouth/Throat:      Mouth: Mucous membranes are moist.   Eyes:      Extraocular Movements: Extraocular movements intact.      Pupils: Pupils are equal, round, and reactive to light.   Cardiovascular:      Rate and Rhythm: Normal rate and regular rhythm.   Pulmonary:      Effort: Pulmonary effort is normal.      Breath sounds: Normal breath sounds.   Abdominal:      General: Abdomen is flat. Bowel sounds are normal.      Palpations: Abdomen is soft.      Tenderness: There is abdominal tenderness. There is no guarding.   Musculoskeletal:         General: Normal range of motion.      Cervical back: Normal range of motion and neck supple.   Skin:     General: Skin is warm and dry.   Neurological:      General: No focal deficit present.      Mental Status: She is alert and oriented to person, place, and time.   Psychiatric:         Mood and Affect: Mood normal.         Behavior: Behavior normal.         Thought Content: Thought content normal.         Judgment: Judgment normal.         Current Medications:  Scheduled Meds:[Held by provider] cholestyramine light, 1 packet, Oral, Daily  [Held by provider] dicyclomine, 20 mg, Oral, 4x Daily  [Held by provider]  famotidine, 40 mg, Oral, Nightly  [Held by provider] nortriptyline, 10 mg, Oral, Nightly  [Held by provider] pancrelipase (Lip-Prot-Amyl), 24,000 units of lipase, Oral, TID With Meals      Continuous Infusions:sodium chloride, 100 mL/hr, Last Rate: 100 mL/hr (12/25/24 0845)      PRN Meds:.  [Held by provider] acetaminophen    HYDROmorphone    [Held by provider] hyoscyamine    ondansetron    [COMPLETED] Insert Peripheral IV **AND** sodium chloride    traMADol       Diagnostic Data    Results from last 7 days   Lab Units 12/25/24  0511 12/24/24  1630   WBC 10*3/mm3 6.64 8.87   HEMOGLOBIN g/dL 12.5 13.2   HEMATOCRIT % 40.0 40.0   PLATELETS 10*3/mm3 208 209   GLUCOSE mg/dL 76 129*   CREATININE mg/dL 0.77 0.88   BUN mg/dL 8 11   SODIUM mmol/L 141 142   POTASSIUM mmol/L 3.9 3.9   AST (SGOT) U/L  --  18   ALT (SGPT) U/L  --  14   ALK PHOS U/L  --  108   BILIRUBIN mg/dL  --  0.3   ANION GAP mmol/L 8.3 11.9       CT Abdomen Pelvis With Contrast    Result Date: 12/24/2024  Impression: Findings consistent with acute pancreatitis involving the pancreatic head. No evidence of necrosis, pancreatic ductal dilation or formation of fluid collection. Electronically Signed: Ivan Link DO  12/24/2024 5:13 PM EST  Workstation ID: [id number]       I reviewed the patient's new clinical results.    Assessment/Plan:     Active and Resolved Problems  Active Hospital Problems    Diagnosis  POA    **Acute pancreatitis [K85.90]  Yes    Hypertension [I10]  Yes    Major depressive disorder, recurrent, moderate [F33.1]  Yes    Tobacco dependence syndrome [F17.200]  Yes    Irritable bowel syndrome [K58.9]  Yes    Hypothyroidism [E03.9]  Yes    Fibromyalgia [M79.7]  Yes      Resolved Hospital Problems   No resolved problems to display.       Acute on chronic pancreatitis  - Still complaining of abdominal pain.  Continue IV fluids, continue n.p.o., lipase trending downwards, continue current pain medications.    Depression  - Restart home dose  of nortriptyline    Irritable bowel syndrome  - Continue home dose of Bentyl    VTE Prophylaxis:  No VTE prophylaxis order currently exists.       Disposition Planning:     Barriers to Discharge: Pending clinical improved  Anticipated Date of Discharge: 12/30/2024  Place of Discharge: Home      There are no questions and answers to display.       Signature: Electronically signed by Steph Benavides MD, 12/25/24, 11:16 EST.  [facility]

## 2024-12-25 NOTE — PLAN OF CARE
Problem: Adult Inpatient Plan of Care  Goal: Plan of Care Review  Outcome: Progressing  Goal: Patient-Specific Goal (Individualized)  Outcome: Progressing  Goal: Absence of Hospital-Acquired Illness or Injury  Outcome: Progressing  Intervention: Identify and Manage Fall Risk  Recent Flowsheet Documentation  Taken 12/25/2024 1627 by Diana Oates RN  Safety Promotion/Fall Prevention:   safety round/check completed   room organization consistent   nonskid shoes/slippers when out of bed   clutter free environment maintained   assistive device/personal items within reach  Taken 12/25/2024 1209 by Diana Oates RN  Safety Promotion/Fall Prevention:   safety round/check completed   room organization consistent   nonskid shoes/slippers when out of bed   clutter free environment maintained   assistive device/personal items within reach  Taken 12/25/2024 1139 by Diana Oates RN  Safety Promotion/Fall Prevention:   safety round/check completed   room organization consistent   nonskid shoes/slippers when out of bed   clutter free environment maintained   assistive device/personal items within reach  Taken 12/25/2024 1109 by Diana Oates RN  Safety Promotion/Fall Prevention:   safety round/check completed   room organization consistent   clutter free environment maintained   assistive device/personal items within reach  Taken 12/25/2024 1032 by Diana Oates RN  Safety Promotion/Fall Prevention:   safety round/check completed   room organization consistent   clutter free environment maintained   assistive device/personal items within reach   nonskid shoes/slippers when out of bed  Taken 12/25/2024 0939 by Diana Oates RN  Safety Promotion/Fall Prevention:   safety round/check completed   room organization consistent   clutter free environment maintained   assistive device/personal items within reach  Taken 12/25/2024 0809 by Diana Oates, RN  Safety Promotion/Fall Prevention:   safety round/check  completed   room organization consistent   nonskid shoes/slippers when out of bed   clutter free environment maintained   assistive device/personal items within reach  Intervention: Prevent Skin Injury  Recent Flowsheet Documentation  Taken 12/25/2024 1627 by Diana Oates RN  Body Position: sitting up in bed  Intervention: Prevent Infection  Recent Flowsheet Documentation  Taken 12/25/2024 1627 by Diana Oates RN  Infection Prevention:   single patient room provided   rest/sleep promoted   hand hygiene promoted  Taken 12/25/2024 0809 by Diana Oates RN  Infection Prevention:   rest/sleep promoted   hand hygiene promoted   single patient room provided  Goal: Optimal Comfort and Wellbeing  Outcome: Progressing  Goal: Readiness for Transition of Care  Outcome: Progressing     Problem: Pain Acute  Goal: Optimal Pain Control and Function  Outcome: Progressing  Intervention: Prevent or Manage Pain  Recent Flowsheet Documentation  Taken 12/25/2024 1627 by Diana Oates RN  Medication Review/Management: medications reviewed  Taken 12/25/2024 1139 by Diana Oates RN  Medication Review/Management: medications reviewed  Taken 12/25/2024 0809 by Diana Oates RN  Medication Review/Management: medications reviewed   Goal Outcome Evaluation:

## 2024-12-25 NOTE — ED NOTES
Nursing report ED to floor  Dianna Claros  49 y.o.  female    HPI:   Chief Complaint   Patient presents with    Abdominal Pain       Admitting doctor:   Carlos Llanes Alvarez, MD    Admitting diagnosis:   The primary encounter diagnosis was Acute pancreatitis, unspecified complication status, unspecified pancreatitis type. Diagnoses of RUQ abdominal pain and Nausea without vomiting were also pertinent to this visit.    Code status:   Current Code Status       Date Active Code Status Order ID Comments User Context       Prior            Allergies:   Roxicodone [oxycodone hcl], Toradol [ketorolac tromethamine], Gabapentin, Naproxen, Pregabalin, and Morphine    Isolation:  No active isolations     Fall Risk:  Fall Risk Assessment was completed, and patient is at moderate risk for falls.   Predictive Model Details         3 (Low) Factor Value    Calculated 12/24/2024 19:32 Age 49    Risk of Fall Model Active Peripheral IV Present     Imaging order in this encounter Present     Respiratory Rate 18     Magnesium 2 mg/dL     Number of Distinct Medication Classes administered 4     Diastolic BP 70     Tobacco Use Current     Fuentes Scale not on file     Number of administrations of Analgesic Narcotics 2     Albumin 4.2 g/dL     Calcium 9.4 mg/dL     Creatinine 0.88 mg/dL     Days after Admission 0.147     Total Bilirubin 0.3 mg/dL     Chloride 105 mmol/L     Potassium 3.9 mmol/L     ALT 14 U/L         Weight:       12/24/24  1559   Weight: 90.1 kg (198 lb 10.2 oz)       Intake and Output    Intake/Output Summary (Last 24 hours) at 12/24/2024 1934  Last data filed at 12/24/2024 1811  Gross per 24 hour   Intake 1000 ml   Output --   Net 1000 ml       Diet:        Most recent vitals:   Vitals:    12/24/24 1559 12/24/24 1614 12/24/24 1804 12/24/24 1844   BP: 136/70 121/68  105/70   BP Location: Right arm      Patient Position: Sitting      Pulse: 81 63  76   Resp: 20   18   Temp: 97.1 °F (36.2 °C)      TempSrc: Oral     "  SpO2: 95% 99% 99% 99%   Weight: 90.1 kg (198 lb 10.2 oz)      Height: 165.1 cm (65\")          Active LDAs/IV Access:   Lines, Drains & Airways       Active LDAs       Name Placement date Placement time Site Days    Peripheral IV 12/24/24 1631 Left Antecubital 12/24/24  1631  Antecubital  less than 1                    Skin Condition:   Skin Assessments (last day)       None             Labs (abnormal labs have a star):   Labs Reviewed   COMPREHENSIVE METABOLIC PANEL - Abnormal; Notable for the following components:       Result Value    Glucose 129 (*)     All other components within normal limits    Narrative:     GFR Categories in Chronic Kidney Disease (CKD)      GFR Category          GFR (mL/min/1.73)    Interpretation  G1                     90 or greater         Normal or high (1)  G2                      60-89                Mild decrease (1)  G3a                   45-59                Mild to moderate decrease  G3b                   30-44                Moderate to severe decrease  G4                    15-29                Severe decrease  G5                    14 or less           Kidney failure          (1)In the absence of evidence of kidney disease, neither GFR category G1 or G2 fulfill the criteria for CKD.    eGFR calculation 2021 CKD-EPI creatinine equation, which does not include race as a factor   LIPASE - Abnormal; Notable for the following components:    Lipase 1,367 (*)     All other components within normal limits   URINALYSIS W/ CULTURE IF INDICATED - Abnormal; Notable for the following components:    Specific Gravity, UA 1.090 (*)     All other components within normal limits    Narrative:     In absence of clinical symptoms, the presence of pyuria, bacteria, and/or nitrites on the urinalysis result does not correlate with infection.  Urine microscopic not indicated.   MAGNESIUM - Normal   CBC WITH AUTO DIFFERENTIAL - Normal   CBC AND DIFFERENTIAL    Narrative:     The following orders were " created for panel order CBC & Differential.  Procedure                               Abnormality         Status                     ---------                               -----------         ------                     CBC Auto Differential[[id number]]        Normal              Final result                 Please view results for these tests on the individual orders.       LOC: Person, Place, Time, and Situation    Telemetry:  Med/Surg    Cardiac Monitoring Ordered: no    EKG:   No orders to display       Medications Given in the ED:   Medications   sodium chloride 0.9 % flush 10 mL (has no administration in time range)   HYDROmorphone (DILAUDID) injection 0.5 mg (0.5 mg Intravenous Given 12/24/24 1637)   ondansetron (ZOFRAN) injection 4 mg (4 mg Intravenous Given 12/24/24 1636)   sodium chloride 0.9 % bolus 500 mL (0 mL Intravenous Stopped 12/24/24 1717)   iopamidol (ISOVUE-370) 76 % injection 100 mL (100 mL Intravenous Given 12/24/24 1705)   sodium chloride 0.9 % bolus 500 mL (0 mL Intravenous Stopped 12/24/24 1811)   HYDROmorphone (DILAUDID) injection 0.5 mg (0.5 mg Intravenous Given 12/24/24 1842)       Imaging results:  CT Abdomen Pelvis With Contrast    Result Date: 12/24/2024  Impression: Findings consistent with acute pancreatitis involving the pancreatic head. No evidence of necrosis, pancreatic ductal dilation or formation of fluid collection. Electronically Signed: Ivan Link DO  12/24/2024 5:13 PM EST  Workstation ID: [id number]     Social issues:   Social History     Socioeconomic History    Marital status:    Tobacco Use    Smoking status: Every Day     Current packs/day: 1.00     Average packs/day: 1 pack/day for 34.6 years (34.6 ttl pk-yrs)     Types: Cigarettes     Start date: 6/5/1990    Smokeless tobacco: Never   Vaping Use    Vaping status: Never Used   Substance and Sexual Activity    Alcohol use: Never    Drug use: Never    Sexual activity: Defer       NIH Stroke Scale:  Interval:  (not recorded)  1a. Level of Consciousness: (not recorded)  1b. LOC Questions: (not recorded)  1c. LOC Commands: (not recorded)  2. Best Gaze: (not recorded)  3. Visual: (not recorded)  4. Facial Palsy: (not recorded)  5a. Motor Arm, Left: (not recorded)  5b. Motor Arm, Right: (not recorded)  6a. Motor Leg, Left: (not recorded)  6b. Motor Leg, Right: (not recorded)  7. Limb Ataxia: (not recorded)  8. Sensory: (not recorded)  9. Best Language: (not recorded)  10. Dysarthria: (not recorded)  11. Extinction and Inattention (formerly Neglect): (not recorded)    Total (NIH Stroke Scale): (not recorded)     Additional notable assessment information:     Nursing report ED to floor:    Moody Canseco RN   12/24/24 19:34 EST

## 2024-12-26 ENCOUNTER — INPATIENT HOSPITAL (OUTPATIENT)
Dept: URBAN - METROPOLITAN AREA HOSPITAL 84 | Facility: HOSPITAL | Age: 49
End: 2024-12-26
Payer: COMMERCIAL

## 2024-12-26 DIAGNOSIS — R74.01 ELEVATION OF LEVELS OF LIVER TRANSAMINASE LEVELS: ICD-10-CM

## 2024-12-26 DIAGNOSIS — K85.90 ACUTE PANCREATITIS WITHOUT NECROSIS OR INFECTION, UNSPECIFIE: ICD-10-CM

## 2024-12-26 DIAGNOSIS — D64.9 ANEMIA, UNSPECIFIED: ICD-10-CM

## 2024-12-26 DIAGNOSIS — K86.1 OTHER CHRONIC PANCREATITIS: ICD-10-CM

## 2024-12-26 DIAGNOSIS — R10.13 EPIGASTRIC PAIN: ICD-10-CM

## 2024-12-26 LAB
ALBUMIN SERPL-MCNC: 3.4 G/DL (ref 3.5–5.2)
ALBUMIN/GLOB SERPL: 1.5 G/DL
ALP SERPL-CCNC: 112 U/L (ref 39–117)
ALT SERPL W P-5'-P-CCNC: 78 U/L (ref 1–33)
ANION GAP SERPL CALCULATED.3IONS-SCNC: 7.4 MMOL/L (ref 5–15)
AST SERPL-CCNC: 60 U/L (ref 1–32)
BASOPHILS # BLD AUTO: 0.02 10*3/MM3 (ref 0–0.2)
BASOPHILS NFR BLD AUTO: 0.3 % (ref 0–1.5)
BILIRUB SERPL-MCNC: 0.7 MG/DL (ref 0–1.2)
BUN SERPL-MCNC: 6 MG/DL (ref 6–20)
BUN/CREAT SERPL: 7.8 (ref 7–25)
CALCIUM SPEC-SCNC: 8.1 MG/DL (ref 8.6–10.5)
CHLORIDE SERPL-SCNC: 107 MMOL/L (ref 98–107)
CO2 SERPL-SCNC: 25.6 MMOL/L (ref 22–29)
CREAT SERPL-MCNC: 0.77 MG/DL (ref 0.57–1)
CRP SERPL-MCNC: 2.54 MG/DL (ref 0–0.5)
DEPRECATED RDW RBC AUTO: 43.6 FL (ref 37–54)
EGFRCR SERPLBLD CKD-EPI 2021: 95.9 ML/MIN/1.73
EOSINOPHIL # BLD AUTO: 0.13 10*3/MM3 (ref 0–0.4)
EOSINOPHIL NFR BLD AUTO: 2.2 % (ref 0.3–6.2)
ERYTHROCYTE [DISTWIDTH] IN BLOOD BY AUTOMATED COUNT: 12.7 % (ref 12.3–15.4)
GLOBULIN UR ELPH-MCNC: 2.2 GM/DL
GLUCOSE SERPL-MCNC: 78 MG/DL (ref 65–99)
HCT VFR BLD AUTO: 33.8 % (ref 34–46.6)
HGB BLD-MCNC: 11.1 G/DL (ref 12–15.9)
IMM GRANULOCYTES # BLD AUTO: 0.02 10*3/MM3 (ref 0–0.05)
IMM GRANULOCYTES NFR BLD AUTO: 0.3 % (ref 0–0.5)
LIPASE SERPL-CCNC: 140 U/L (ref 13–60)
LYMPHOCYTES # BLD AUTO: 2.06 10*3/MM3 (ref 0.7–3.1)
LYMPHOCYTES NFR BLD AUTO: 35.3 % (ref 19.6–45.3)
MCH RBC QN AUTO: 30.4 PG (ref 26.6–33)
MCHC RBC AUTO-ENTMCNC: 32.8 G/DL (ref 31.5–35.7)
MCV RBC AUTO: 92.6 FL (ref 79–97)
MONOCYTES # BLD AUTO: 0.43 10*3/MM3 (ref 0.1–0.9)
MONOCYTES NFR BLD AUTO: 7.4 % (ref 5–12)
NEUTROPHILS NFR BLD AUTO: 3.17 10*3/MM3 (ref 1.7–7)
NEUTROPHILS NFR BLD AUTO: 54.5 % (ref 42.7–76)
NRBC BLD AUTO-RTO: 0 /100 WBC (ref 0–0.2)
PLATELET # BLD AUTO: 169 10*3/MM3 (ref 140–450)
PMV BLD AUTO: 10.9 FL (ref 6–12)
POTASSIUM SERPL-SCNC: 4 MMOL/L (ref 3.5–5.2)
PROT SERPL-MCNC: 5.6 G/DL (ref 6–8.5)
RBC # BLD AUTO: 3.65 10*6/MM3 (ref 3.77–5.28)
SODIUM SERPL-SCNC: 140 MMOL/L (ref 136–145)
WBC NRBC COR # BLD AUTO: 5.83 10*3/MM3 (ref 3.4–10.8)

## 2024-12-26 PROCEDURE — 85025 COMPLETE CBC W/AUTO DIFF WBC: CPT | Performed by: NURSE PRACTITIONER

## 2024-12-26 PROCEDURE — 25010000002 HYDROMORPHONE PER 4 MG: Performed by: INTERNAL MEDICINE

## 2024-12-26 PROCEDURE — 80053 COMPREHEN METABOLIC PANEL: CPT

## 2024-12-26 PROCEDURE — 83690 ASSAY OF LIPASE: CPT

## 2024-12-26 PROCEDURE — 99232 SBSQ HOSP IP/OBS MODERATE 35: CPT | Performed by: INTERNAL MEDICINE

## 2024-12-26 PROCEDURE — 25810000003 SODIUM CHLORIDE 0.9 % SOLUTION: Performed by: INTERNAL MEDICINE

## 2024-12-26 PROCEDURE — 86140 C-REACTIVE PROTEIN: CPT

## 2024-12-26 PROCEDURE — 25010000002 ONDANSETRON PER 1 MG: Performed by: NURSE PRACTITIONER

## 2024-12-26 RX ORDER — SODIUM CHLORIDE 9 MG/ML
100 INJECTION, SOLUTION INTRAVENOUS CONTINUOUS
Status: DISPENSED | OUTPATIENT
Start: 2024-12-26 | End: 2024-12-27

## 2024-12-26 RX ADMIN — HYDROMORPHONE HYDROCHLORIDE 0.5 MG: 1 INJECTION, SOLUTION INTRAMUSCULAR; INTRAVENOUS; SUBCUTANEOUS at 06:29

## 2024-12-26 RX ADMIN — DICYCLOMINE HYDROCHLORIDE 20 MG: 10 CAPSULE ORAL at 17:29

## 2024-12-26 RX ADMIN — HYDROMORPHONE HYDROCHLORIDE 0.5 MG: 1 INJECTION, SOLUTION INTRAMUSCULAR; INTRAVENOUS; SUBCUTANEOUS at 15:41

## 2024-12-26 RX ADMIN — HYDROMORPHONE HYDROCHLORIDE 0.5 MG: 1 INJECTION, SOLUTION INTRAMUSCULAR; INTRAVENOUS; SUBCUTANEOUS at 01:11

## 2024-12-26 RX ADMIN — PANCRELIPASE 24000 UNITS OF LIPASE: 60000; 12000; 38000 CAPSULE, DELAYED RELEASE PELLETS ORAL at 08:11

## 2024-12-26 RX ADMIN — NORTRIPTYLINE HYDROCHLORIDE 10 MG: 10 CAPSULE ORAL at 21:10

## 2024-12-26 RX ADMIN — HYDROMORPHONE HYDROCHLORIDE 0.5 MG: 1 INJECTION, SOLUTION INTRAMUSCULAR; INTRAVENOUS; SUBCUTANEOUS at 08:42

## 2024-12-26 RX ADMIN — HYDROMORPHONE HYDROCHLORIDE 0.5 MG: 1 INJECTION, SOLUTION INTRAMUSCULAR; INTRAVENOUS; SUBCUTANEOUS at 12:44

## 2024-12-26 RX ADMIN — HYDROMORPHONE HYDROCHLORIDE 0.5 MG: 1 INJECTION, SOLUTION INTRAMUSCULAR; INTRAVENOUS; SUBCUTANEOUS at 18:50

## 2024-12-26 RX ADMIN — FAMOTIDINE 40 MG: 20 TABLET, FILM COATED ORAL at 21:10

## 2024-12-26 RX ADMIN — DICYCLOMINE HYDROCHLORIDE 20 MG: 10 CAPSULE ORAL at 12:41

## 2024-12-26 RX ADMIN — DICYCLOMINE HYDROCHLORIDE 20 MG: 10 CAPSULE ORAL at 21:10

## 2024-12-26 RX ADMIN — SODIUM CHLORIDE 100 ML/HR: 9 INJECTION, SOLUTION INTRAVENOUS at 17:29

## 2024-12-26 RX ADMIN — PANCRELIPASE 24000 UNITS OF LIPASE: 60000; 12000; 38000 CAPSULE, DELAYED RELEASE PELLETS ORAL at 12:41

## 2024-12-26 RX ADMIN — PANCRELIPASE 24000 UNITS OF LIPASE: 60000; 12000; 38000 CAPSULE, DELAYED RELEASE PELLETS ORAL at 17:30

## 2024-12-26 RX ADMIN — SODIUM CHLORIDE 100 ML/HR: 9 INJECTION, SOLUTION INTRAVENOUS at 08:43

## 2024-12-26 RX ADMIN — DICYCLOMINE HYDROCHLORIDE 20 MG: 10 CAPSULE ORAL at 08:11

## 2024-12-26 RX ADMIN — HYDROMORPHONE HYDROCHLORIDE 0.5 MG: 1 INJECTION, SOLUTION INTRAMUSCULAR; INTRAVENOUS; SUBCUTANEOUS at 22:46

## 2024-12-26 RX ADMIN — ONDANSETRON 4 MG: 2 INJECTION INTRAMUSCULAR; INTRAVENOUS at 15:41

## 2024-12-26 NOTE — PROGRESS NOTES
LOS: 2 days   Patient Care Team:  Anita Blanco MD as PCP - Robert Polanco MD as Consulting Physician (General Surgery)  NAVIN Schneider MD as Consulting Physician (Gastroenterology)      Subjective     Interval History:     Subjective: Patient reports feeling much better this morning.  Reports abdominal pain has significantly lessened.  Reports feeling hungry.  Reports tolerating liquid diet with no nausea or vomiting.      ROS:   No chest pain, shortness of breath, or cough.  No decreased appetite, nausea, or vomiting.   + Mild abdominal pain       Medication Review:     Current Facility-Administered Medications:     [Held by provider] acetaminophen (TYLENOL) tablet 650 mg, 650 mg, Oral, Q6H PRN, Marizol Mojica APRN    [Held by provider] cholestyramine light packet 4 g, 1 packet, Oral, Daily, Marizol Mojica APRN    dicyclomine (BENTYL) capsule 20 mg, 20 mg, Oral, 4x Daily, Steph Benavides MD, 20 mg at 12/26/24 0811    famotidine (PEPCID) tablet 40 mg, 40 mg, Oral, Nightly, Steph Benavides MD, 40 mg at 12/25/24 2115    HYDROmorphone (DILAUDID) injection 0.5 mg, 0.5 mg, Intravenous, Q3H PRN, Steph Benavides MD, 0.5 mg at 12/26/24 0842    [Held by provider] hyoscyamine (LEVSIN) SL tablet 125 mcg, 125 mcg, Sublingual, Q6H PRN, Marizol Mojica APRN    nortriptyline (PAMELOR) capsule 10 mg, 10 mg, Oral, Nightly, Steph Benavides MD, 10 mg at 12/25/24 2115    ondansetron (ZOFRAN) injection 4 mg, 4 mg, Intravenous, Q6H PRN, Marizol Mojica APRN, 4 mg at 12/25/24 2202    pancrelipase (Lip-Prot-Amyl) (CREON) capsule 24,000 units of lipase, 24,000 units of lipase, Oral, TID With Meals, Steph Benavides MD, 24,000 units of lipase at 12/26/24 0811    [COMPLETED] Insert Peripheral IV, , , Once **AND** sodium chloride 0.9 % flush 10 mL, 10 mL, Intravenous, PRN, Kae Peralta, JAMIL    sodium chloride 0.9 % infusion, 100 mL/hr, Intravenous, Continuous,  "Steph Benavides MD, Last Rate: 100 mL/hr at 12/26/24 0843, 100 mL/hr at 12/26/24 0843      Objective     Vital Signs  Vitals:    12/25/24 1300 12/25/24 1559 12/25/24 1938 12/26/24 0802   BP:  126/74 122/76 126/81   BP Location:  Right arm Right arm Right arm   Patient Position:  Sitting Lying Sitting   Pulse: 54 71 74 57   Resp:  16 14 12   Temp:  97.6 °F (36.4 °C) 98.7 °F (37.1 °C) 97.8 °F (36.6 °C)   TempSrc:  Oral Oral Axillary   SpO2: 94% 97% 98% 100%   Weight:       Height:           Physical Exam:     General Appearance:    Awake and alert, in no acute distress, obese   Head:    Normocephalic, without obvious abnormality   Eyes:          Conjunctivae normal, anicteric sclera   Throat:   No oral lesions, no thrush, oral mucosa moist   Neck:   supple, no JVD   Lungs:     respirations regular, even and unlabored, room air   Abdomen:     Soft, mild epigastric tenderness with palpation, no rebound or guarding, non-distended   Rectal:     Deferred   Extremities:   no cyanosis   Skin:   No bruising or rash, no jaundice        Results Review:    Results from last 7 days   Lab Units 12/26/24  0240 12/25/24  0511 12/24/24  1630   WBC 10*3/mm3 5.83 6.64 8.87   HEMOGLOBIN g/dL 11.1* 12.5 13.2   PLATELETS 10*3/mm3 169 208 209     Results from last 7 days   Lab Units 12/26/24  0240 12/25/24  0511 12/24/24  1630   SODIUM mmol/L 140 141 142   POTASSIUM mmol/L 4.0 3.9 3.9   CHLORIDE mmol/L 107 108* 105   CO2 mmol/L 25.6 24.7 25.1   BUN mg/dL 6 8 11   CREATININE mg/dL 0.77 0.77 0.88   GLUCOSE mg/dL 78 76 129*   ALBUMIN g/dL 3.4*  --  4.2   BILIRUBIN mg/dL 0.7  --  0.3   ALK PHOS U/L 112  --  108   AST (SGOT) U/L 60*  --  18   ALT (SGPT) U/L 78*  --  14   MAGNESIUM mg/dL  --   --  2.0   LIPASE U/L 140* 856* 1,367*   CRP mg/dL 2.54* 0.72*  --      Estimated Creatinine Clearance: 100.1 mL/min (by C-G formula based on SCr of 0.77 mg/dL).  No results found for: \"HGBA1C\"      Infection     UA    Results from last 7 days   Lab " Units 12/24/24  1811   NITRITE UA  Negative     Microbiology Results (last 10 days)       ** No results found for the last 240 hours. **          Imaging Results (Last 72 Hours)       Procedure Component Value Units Date/Time    CT Abdomen Pelvis With Contrast [[id number]] Collected: 12/24/24 1708     Updated: 12/24/24 1715    Narrative:      CT ABDOMEN PELVIS W CONTRAST    Date of Exam: 12/24/2024 5:04 PM EST    Indication: RUQ abd pain radiating to R flank, hx pancreatitis.    Comparison: 12/7/2024    Technique: Axial CT images were obtained of the abdomen and pelvis following the uneventful intravenous administration of iodinated contrast. Sagittal and coronal reconstructions were performed.  Automated exposure control and iterative reconstruction   methods were used.        Findings:  Visualized Chest:  The visualized lung bases and lower mediastinal structures are unremarkable.    Liver: Liver is normal in size and CT density. No focal lesions.    Gallbladder: Status post cholecystectomy    Bile Ducts: No billiary dcutal dilation.    Spleen: Spleen is normal in size and CT density.    Pancreas: Edema and fat stranding surrounding the pancreatic head. No ductal dilation. No definite evidence of necrosis.    Adrenals: Adrenal glands are unremarkable.    Kidneys: Kidneys are normal in size. There are no stones or hydronephrosis.    Gastrointestinal: Unremarkable.    Bladder: The bladder is normal.    Pelvis:  No suspecious mass.    Peritoneum/Mesentery: No fluid collection, ascities, or free air.      Lymph Nodes: No lymphadenopathy.    Vasculature: Unremarkable    Abdominal Wall: Unremarkable    Bony Structures: No acute osseous abnormality      Impression:      Impression:  Findings consistent with acute pancreatitis involving the pancreatic head. No evidence of necrosis, pancreatic ductal dilation or formation of fluid collection.            Electronically Signed: Ivan Link DO    12/24/2024 5:13 PM EST     Workstation ID: [id number]            Assessment & Plan   ASSESSMENT:  -Acute on chronic pancreatitis  -Epigastric/RUQ abdominal pain-improving  -Elevated lipase-trending down  -Elevated liver enzymes  -Normocytic anemia  -Tobacco use disorder  -Chronic diarrhea  -History of cholecystectomy     PLAN:  49-year-old female presents to the hospital with acute on chronic pancreatitis.  Multiple hospitalizations/ER visits over the last year with abdominal pain.  She has had negative autoimmune workup, EUS showed chronic pancreatitis.  She continues to smoke cigarettes.    Alkaline phosphatase 112, AST 60, ALT 78, total bilirubin 0.7  CRP 2.54, lipase 140  Hemoglobin 11.1, MCV 92.6, otherwise CBC unremarkable    CT abdomen pelvis with contrast 12/24/2024 shows acute pancreatitis involving the pancreas head without fluid collection or necrosis    -Continue to trend labs.  AST/ALT slightly elevated today.  Alkaline phosphatase and total bilirubin unremarkable.  CRP elevated from 0.72 yesterday to 2.54 today.  Lipase decreased from 856 yesterday to 140 today.  -Hemoglobin slightly decreased from 12.5 yesterday to 11.1 today, likely dilutional from IV fluids.  Continue to monitor H&H and transfuse as needed for hemoglobin less than 7.  -Low-fat diet as tolerated.  -Continue Creon 24,000 using its 3 times daily with meals  -History of chronic diarrhea with negative random colon biopsies. 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  Well-controlled with use of WelChol and Zenpep. Ok to resume once she is eating again.   -Discussed smoking cessation.  Patient is working on decreasing this.  -Continue supportive care.  Okay to discharge home with follow-up tolerating low-fat diet.  GI will be available as needed in the hospital, please call with questions       Electronically signed by Sudhakar Queen MD, 12/26/24, 10:17 AM EST.

## 2024-12-26 NOTE — PLAN OF CARE
Goal Outcome Evaluation:          Patient is resting, vss, c/o pain, meds given. C/o nausea, med given. No other complaints. Call light in reach, POC ongoing                                         Problem: Adult Inpatient Plan of Care  Goal: Plan of Care Review  Outcome: Unable to Meet  Goal: Patient-Specific Goal (Individualized)  Outcome: Unable to Meet  Goal: Absence of Hospital-Acquired Illness or Injury  Outcome: Unable to Meet  Goal: Optimal Comfort and Wellbeing  Outcome: Unable to Meet  Goal: Readiness for Transition of Care  Outcome: Unable to Meet     Problem: Pain Acute  Goal: Optimal Pain Control and Function  Outcome: Unable to Meet

## 2024-12-26 NOTE — PLAN OF CARE
Goal Outcome Evaluation:  Patient is resting, vss, c/o pain, meds given. C/o nausea, med given. No other complaints. Call light in reach, POC ongoing

## 2024-12-26 NOTE — PAYOR COMM NOTE
"This is inpatient admit submission for Natalia Pena.    IP admit on 12/24/24.    AUTHORIZATION PENDING:   PLEASE FAX OR CALL DETERMINATION TO CONTACT BELOW:   THANK YOU.      Renetta Francisco RN, [facility]  Utilization Nurse  [address] [address]   [phone]  Fx [phone]     Natalia Pena (49 y.o. Female)       Date of Birth   1975    Social Security Number       Address   [address] [place] [id number]    Home Phone   [phone]    MRN   [id number]       Nondenominational   None    Marital Status                               Admission Date   12/24/24    Admission Type   Emergency    Admitting Provider   David Alvarado MD    Attending Provider   Steph Benavides MD    Department, Room/Bed   [facility] MEDICAL INPATIENT, 208/1       Discharge Date       Discharge Disposition       Discharge Destination                                 Attending Provider: Steph Benavides MD    Allergies: Roxicodone [Oxycodone Hcl], Toradol [Ketorolac Tromethamine], Gabapentin, Naproxen, Pregabalin, Morphine    Isolation: None   Infection: None   Code Status: CPR    Ht: 165.1 cm (65\")   Wt: 90.1 kg (198 lb 10.2 oz)    Admission Cmt: None   Principal Problem: Acute pancreatitis [K85.90]                   Active Insurance as of 12/24/2024       Primary Coverage       Payor Plan Insurance Group Employer/Plan Group    [employer] AN[employer] BLUE CROSS BLUE SHIELD PPO [id number]       Payor Plan Address Payor Plan Phone Number Payor Plan Fax Number Effective Dates    [address] [phone]  7/1/2024 - None Entered    [facility] [id number]         Subscriber Name Subscriber Birth Date Member ID       NATALIA PENA 1975 [id number]               Secondary Coverage       Payor Plan Insurance Group Employer/Plan Group    [employer]HE[place] MEDICAID [facility] -[id number]       Payor Plan Address Payor Plan Phone Number Payor Plan Fax " Number Effective Dates    MAIL STOP:   2023 - None Entered    [address] [address]       [facility] [id number]         Subscriber Name Subscriber Birth Date Member ID       NATALIA PENA 1975 [id number]                     Emergency Contacts        (Rel.) Home Phone Work Phone Mobile Phone    LANDEN LARA (Son) -- -- [phone]    TONI PENA (Spouse) [phone] -- --                 History & Physical        Essing-Marizol Guajardo APRN at 24       Attestation signed by David Alvarado MD at 24 09    I have reviewed this documentation and agree.                      [facility] Medicine Services  History & Physical    Patient Name: Natalia Pena  : 1975  MRN: [id number]  Primary Care Physician:  Anita Blanco MD  Date of admission: 2024  Date and Time of Service: 2024 at 2020    Subjective      Chief Complaint: abdominal pain    History of Present Illness: Natalia Pena is a very pleasant  49 y.o. female with a CMH of depression, fibromyalgia, irritable bowel syndrome, hypothyroidism, hypertension, hyperlipidemia, obesity BMI 33 and recurrent acute episodes of chronic pancreatitis status postcholecystectomy who presented to [facility] on [facility]24 with complaints of right upper quadrant pain that radiates to the right flank.  Pain started about an hour prior to arrival with nausea and no vomiting.  She reports her history of pancreatitis but states this feels like a flare.  She denies any fever, diarrhea, hematemesis, chest pain or shortness of breath.  Review of records shows she has had 13 either ED visits or admissions for abdominal pain or pancreatitis over this past year here.  He was recently seen 2-1/2 weeks ago in the emergency department for abdominal pain.  Lipase was not elevated at 47 and CT abdomen pelvis done at that time showed no acute findings.  He was last admitted here in 2024  to 2024 for abdominal pain acute pancreatitis lipase during that admission at 1038 and CT pelvis with contrast showing acute interstitial edematous pancreatitis.  She was admitted here last December with pancreatitis and was seen by GI and underwent an EGD which showed no evidence of pancreas divisum but did show chronic pancreatitis.  Lipase in the ED today was 1367 and CT abdomen and pelvis with contrast shows findings consistent with acute pancreatitis involving the pancreatic head no evidence of necrosis pancreatic ductal dilation or formation of fluid collection.  He was given IV pain medications started on IV fluid hydration and antiemetics and gastroenterology has been consulted.  She denies any alcohol use.      Review of Systems   HENT: Negative.     Eyes: Negative.    Respiratory: Negative.     Cardiovascular: Negative.    Gastrointestinal:  Positive for abdominal pain and nausea.   Endocrine: Negative.    Genitourinary: Negative.    Musculoskeletal: Negative.    Skin: Negative.    Allergic/Immunologic: Negative.    Neurological: Negative.    Hematological: Negative.    Psychiatric/Behavioral: Negative.     All other systems reviewed and are negative.      Personal History     Past Medical History:   Diagnosis Date    Endometriosis     Fibromyalgia     Hyperlipidemia     Hypertension        Past Surgical History:   Procedure Laterality Date    ABDOMINAL SURGERY       SECTION      CHOLECYSTECTOMY WITH INTRAOPERATIVE CHOLANGIOGRAM N/A 2022    Procedure: CHOLECYSTECTOMY LAPAROSCOPIC INTRAOPERATIVE CHOLANGIOGRAM;  Surgeon: Robert Dale MD;  Location: [facility] MAIN OR;  Service: General;  Laterality: N/A;    COLONOSCOPY N/A 3/7/2023    Procedure: COLONOSCOPY with ileum and large intestine random biopsies R/O microscopic colitis;  Surgeon: NAVIN Schneider MD;  Location: [facility] ENDOSCOPY;  Service: Gastroenterology;  Laterality: N/A;  hemorrhoids    ENDOSCOPY N/A 2022     Procedure: ESOPHAGOGASTRODUODENOSCOPY WITH BIOPSY;  Surgeon: NAVIN Schneider MD;  Location: [facility] ENDOSCOPY;  Service: Gastroenterology;  Laterality: N/A;    HYSTERECTOMY      UPPER ENDOSCOPIC ULTRASOUND W/ FNA N/A 12/4/2023    Procedure: ESOPHAGOGASTRODUODENOSCOPY WITH ENDOSCOPIC ULTRASOUND;  Surgeon: Malik King MD;  Location: [facility] ENDOSCOPY;  Service: Gastroenterology;  Laterality: N/A;  POST: CHRONIC PANCREATITIS       Family History: family history includes Cancer in her mother; Heart disease in her father; Pancreatic cancer in her sister. Otherwise pertinent FHx was reviewed and not pertinent to current issue.    Social History:  reports that she has been smoking cigarettes. She started smoking about 34 years ago. She has a 34.6 pack-year smoking history. She has never used smokeless tobacco. She reports that she does not drink alcohol and does not use drugs.    Home Medications:  Prior to Admission Medications       Prescriptions Last Dose Informant Patient Reported? Taking?    acetaminophen (TYLENOL) 325 MG tablet 12/24/2024  Yes Yes    Take 2 tablets by mouth Every 6 (Six) Hours As Needed for Mild Pain.    colesevelam (WELCHOL) 625 MG tablet Past Week  Yes Yes    Take 1 tablet by mouth 2 (Two) Times a Day With Meals.    dicyclomine (BENTYL) 20 MG tablet 12/24/2024  Yes Yes    Take 1 tablet by mouth Every 6 (Six) Hours As Needed. for pain    famotidine (PEPCID) 40 MG tablet 12/23/2024  Yes Yes    Take 1 tablet by mouth Every Night.    hyoscyamine (LEVSIN) 0.125 MG SL tablet 12/24/2024  Yes Yes    Place 1 tablet under the tongue Every 6 (Six) Hours As Needed for Cramping.    nortriptyline (PAMELOR) 10 MG capsule 12/23/2024  Yes Yes    Take 1 capsule by mouth Every Night.    ondansetron (ZOFRAN) 4 MG tablet 12/24/2024  No Yes    Take 1 tablet by mouth Every 8 (Eight) Hours As Needed for Nausea or Vomiting.    ondansetron ODT (ZOFRAN-ODT) 4 MG disintegrating tablet 12/24/2024  Yes Yes    Place 1 tablet  on the tongue Every 8 (Eight) Hours As Needed for Nausea or Vomiting.    Pancrelipase, Lip-Prot-Amyl, (Zenpep) 32414-64390 units capsule delayed-release particles 12/24/2024  Yes Yes    Take 2 capsules by mouth 3 (Three) Times a Day With Meals.              Allergies:  Allergies   Allergen Reactions    Roxicodone [Oxycodone Hcl] Itching and Nausea Only    Toradol [Ketorolac Tromethamine] Itching and Irritability    Gabapentin Nausea And Vomiting    Naproxen Nausea And Vomiting    Pregabalin Hives    Morphine Itching       Objective      Vitals:   Temp:  [97.1 °F (36.2 °C)-98.7 °F (37.1 °C)] 98.7 °F (37.1 °C)  Heart Rate:  [63-81] 73  Resp:  [18-20] 20  BP: (105-136)/(68-71) 117/71  Body mass index is 33.05 kg/m².  Physical Exam  Vitals reviewed.   Constitutional:       Appearance: Normal appearance. She is obese.   HENT:      Head: Normocephalic and atraumatic.      Right Ear: External ear normal.      Left Ear: External ear normal.      Nose: Nose normal.      Mouth/Throat:      Mouth: Mucous membranes are moist.   Eyes:      Extraocular Movements: Extraocular movements intact.   Cardiovascular:      Rate and Rhythm: Normal rate and regular rhythm.      Pulses: Normal pulses.      Heart sounds: Normal heart sounds.   Pulmonary:      Effort: Pulmonary effort is normal.      Breath sounds: Normal breath sounds.   Abdominal:      Palpations: Abdomen is soft.   Genitourinary:     Comments: deferred  Musculoskeletal:         General: Normal range of motion.      Cervical back: Normal range of motion and neck supple.   Skin:     General: Skin is warm and dry.   Neurological:      General: No focal deficit present.      Mental Status: She is alert and oriented to person, place, and time.   Psychiatric:         Mood and Affect: Mood normal.         Behavior: Behavior normal.         Thought Content: Thought content normal.         Judgment: Judgment normal.         Diagnostic Data:  Lab Results (last 24 hours)        Procedure Component Value Units Date/Time    Urinalysis With Culture If Indicated - Urine, Clean Catch [[id number]]  (Abnormal) Collected: 12/24/24 1811    Specimen: Urine, Clean Catch Updated: 12/24/24 1816     Color, UA Yellow     Appearance, UA Clear     pH, UA 5.5     Specific Gravity, UA 1.090     Glucose, UA Negative     Ketones, UA Negative     Bilirubin, UA Negative     Blood, UA Negative     Protein, UA Negative     Leuk Esterase, UA Negative     Nitrite, UA Negative     Urobilinogen, UA 0.2 E.U./dL    Narrative:      In absence of clinical symptoms, the presence of pyuria, bacteria, and/or nitrites on the urinalysis result does not correlate with infection.  Urine microscopic not indicated.    Lipase [[id number]]  (Abnormal) Collected: 12/24/24 1630    Specimen: Blood from Arm, Left Updated: 12/24/24 1707     Lipase 1,367 U/L     Comprehensive Metabolic Panel [[id number]]  (Abnormal) Collected: 12/24/24 1630    Specimen: Blood from Arm, Left Updated: 12/24/24 1655     Glucose 129 mg/dL      BUN 11 mg/dL      Creatinine 0.88 mg/dL      Sodium 142 mmol/L      Potassium 3.9 mmol/L      Chloride 105 mmol/L      CO2 25.1 mmol/L      Calcium 9.4 mg/dL      Total Protein 6.8 g/dL      Albumin 4.2 g/dL      ALT (SGPT) 14 U/L      AST (SGOT) 18 U/L      Alkaline Phosphatase 108 U/L      Total Bilirubin 0.3 mg/dL      Globulin 2.6 gm/dL      A/G Ratio 1.6 g/dL      BUN/Creatinine Ratio 12.5     Anion Gap 11.9 mmol/L      eGFR 81.7 mL/min/1.73     Narrative:      GFR Categories in Chronic Kidney Disease (CKD)      GFR Category          GFR (mL/min/1.73)    Interpretation  G1                     90 or greater         Normal or high (1)  G2                      60-89                Mild decrease (1)  G3a                   45-59                Mild to moderate decrease  G3b                   30-44                Moderate to severe decrease  G4                    15-29                Severe decrease  G5                     14 or less           Kidney failure          (1)In the absence of evidence of kidney disease, neither GFR category G1 or G2 fulfill the criteria for CKD.    eGFR calculation 2021 CKD-EPI creatinine equation, which does not include race as a factor    Magnesium [[id number]]  (Normal) Collected: 12/24/24 1630    Specimen: Blood from Arm, Left Updated: 12/24/24 1655     Magnesium 2.0 mg/dL     CBC & Differential [[id number]]  (Normal) Collected: 12/24/24 1630    Specimen: Blood from Arm, Left Updated: 12/24/24 1635    Narrative:      The following orders were created for panel order CBC & Differential.  Procedure                               Abnormality         Status                     ---------                               -----------         ------                     CBC Auto Differential[[id number]]        Normal              Final result                 Please view results for these tests on the individual orders.    CBC Auto Differential [[id number]]  (Normal) Collected: 12/24/24 1630    Specimen: Blood from Arm, Left Updated: 12/24/24 1635     WBC 8.87 10*3/mm3      RBC 4.38 10*6/mm3      Hemoglobin 13.2 g/dL      Hematocrit 40.0 %      MCV 91.3 fL      MCH 30.1 pg      MCHC 33.0 g/dL      RDW 12.6 %      RDW-SD 42.2 fl      MPV 10.8 fL      Platelets 209 10*3/mm3      Neutrophil % 67.6 %      Lymphocyte % 24.5 %      Monocyte % 6.1 %      Eosinophil % 1.2 %      Basophil % 0.3 %      Immature Grans % 0.3 %      Neutrophils, Absolute 5.99 10*3/mm3      Lymphocytes, Absolute 2.17 10*3/mm3      Monocytes, Absolute 0.54 10*3/mm3      Eosinophils, Absolute 0.11 10*3/mm3      Basophils, Absolute 0.03 10*3/mm3      Immature Grans, Absolute 0.03 10*3/mm3      nRBC 0.0 /100 WBC              Imaging Results (Last 24 Hours)       Procedure Component Value Units Date/Time    CT Abdomen Pelvis With Contrast [[id number]] Collected: 12/24/24 1708     Updated: 12/24/24 1715    Narrative:      CT ABDOMEN PELVIS W  CONTRAST    Date of Exam: 12/24/2024 5:04 PM EST    Indication: RUQ abd pain radiating to R flank, hx pancreatitis.    Comparison: 12/7/2024    Technique: Axial CT images were obtained of the abdomen and pelvis following the uneventful intravenous administration of iodinated contrast. Sagittal and coronal reconstructions were performed.  Automated exposure control and iterative reconstruction   methods were used.        Findings:  Visualized Chest:  The visualized lung bases and lower mediastinal structures are unremarkable.    Liver: Liver is normal in size and CT density. No focal lesions.    Gallbladder: Status post cholecystectomy    Bile Ducts: No billiary dcutal dilation.    Spleen: Spleen is normal in size and CT density.    Pancreas: Edema and fat stranding surrounding the pancreatic head. No ductal dilation. No definite evidence of necrosis.    Adrenals: Adrenal glands are unremarkable.    Kidneys: Kidneys are normal in size. There are no stones or hydronephrosis.    Gastrointestinal: Unremarkable.    Bladder: The bladder is normal.    Pelvis:  No suspecious mass.    Peritoneum/Mesentery: No fluid collection, ascities, or free air.      Lymph Nodes: No lymphadenopathy.    Vasculature: Unremarkable    Abdominal Wall: Unremarkable    Bony Structures: No acute osseous abnormality      Impression:      Impression:  Findings consistent with acute pancreatitis involving the pancreatic head. No evidence of necrosis, pancreatic ductal dilation or formation of fluid collection.            Electronically Signed: Ivan Link DO    12/24/2024 5:13 PM EST    Workstation ID: [id number]              Assessment & Plan        This is a 49 y.o. female with:    Active and Resolved Problems  Active Hospital Problems    Diagnosis  POA    **Acute pancreatitis [K85.90]  Yes     Priority: High    Hypertension [I10]  Yes    Major depressive disorder, recurrent, moderate [F33.1]  Yes    Tobacco dependence syndrome [F17.200]   Yes    Irritable bowel syndrome [K58.9]  Yes    Hypothyroidism [E03.9]  Yes    Fibromyalgia [M79.7]  Yes      Resolved Hospital Problems   No resolved problems to display.       Acute on chronic pancreatitis, CT lipase 1367, n.p.o., normal saline at 100 cc/h, 4 mg IV Zofran for nausea, 0.5 mg Dilaudid every 4 hours as needed pain, gastroenterology consulted no signs of necrosis or infection    Hypertension, no current home antihypertensive meds continue to monitor will add as needed antihypertensives if needed    Major depressive disorder, on home nortriptyline, hold for n.p.o. status    Tobacco dependence, encourage cessation declined nicotine patch    Irritable bowel syndrome, hold home WelChol, Bentyl, Pepcid, hyoscyamine and pancrelipase for n.p.o. status pending GI consultation    Resume, no home meds    Fibromyalgia, no home meds        VTE Prophylaxis:  No VTE prophylaxis order currently exists.        The patient desires to be as follows:    CODE STATUS:               Admission Status:  I believe this patient meets inpatient  status.    Expected Length of Stay: pending clinical course     PDMP and Medication Dispenses via Sidebar reviewed and consistent with patient reported medications.    I discussed the patient's findings and my recommendations with patient.      Signature:     This document has been electronically signed by JAMIL Jara on December 24, 2024 23:47 [facility] Hospitalist Team    Electronically signed by David Alvarado MD at 12/25/24 2376          Emergency Department Notes        Moody Canseco, RN at 12/24/24 1934          Nursing report ED to floor  Dianna ENGLAND Harlan  49 y.o.  female    HPI:   Chief Complaint   Patient presents with    Abdominal Pain       Admitting doctor:   Carlos Llanes Alvarez, MD    Admitting diagnosis:   The primary encounter diagnosis was Acute pancreatitis, unspecified complication status, unspecified pancreatitis type. Diagnoses of RUQ  "abdominal pain and Nausea without vomiting were also pertinent to this visit.    Code status:   Current Code Status       Date Active Code Status Order ID Comments User Context       Prior            Allergies:   Roxicodone [oxycodone hcl], Toradol [ketorolac tromethamine], Gabapentin, Naproxen, Pregabalin, and Morphine    Isolation:  No active isolations     Fall Risk:  Fall Risk Assessment was completed, and patient is at moderate risk for falls.   Predictive Model Details         3 (Low) Factor Value    Calculated 12/24/2024 19:32 Age 49    Risk of Fall Model Active Peripheral IV Present     Imaging order in this encounter Present     Respiratory Rate 18     Magnesium 2 mg/dL     Number of Distinct Medication Classes administered 4     Diastolic BP 70     Tobacco Use Current     Fuentes Scale not on file     Number of administrations of Analgesic Narcotics 2     Albumin 4.2 g/dL     Calcium 9.4 mg/dL     Creatinine 0.88 mg/dL     Days after Admission 0.147     Total Bilirubin 0.3 mg/dL     Chloride 105 mmol/L     Potassium 3.9 mmol/L     ALT 14 U/L         Weight:       12/24/24  1559   Weight: 90.1 kg (198 lb 10.2 oz)       Intake and Output    Intake/Output Summary (Last 24 hours) at 12/24/2024 1934  Last data filed at 12/24/2024 1811  Gross per 24 hour   Intake 1000 ml   Output --   Net 1000 ml       Diet:        Most recent vitals:   Vitals:    12/24/24 1559 12/24/24 1614 12/24/24 1804 12/24/24 1844   BP: 136/70 121/68  105/70   BP Location: Right arm      Patient Position: Sitting      Pulse: 81 63  76   Resp: 20   18   Temp: 97.1 °F (36.2 °C)      TempSrc: Oral      SpO2: 95% 99% 99% 99%   Weight: 90.1 kg (198 lb 10.2 oz)      Height: 165.1 cm (65\")          Active LDAs/IV Access:   Lines, Drains & Airways       Active LDAs       Name Placement date Placement time Site Days    Peripheral IV 12/24/24 1631 Left Antecubital 12/24/24  1631  Antecubital  less than 1                    Skin Condition:   Skin " Assessments (last day)       None             Labs (abnormal labs have a star):   Labs Reviewed   COMPREHENSIVE METABOLIC PANEL - Abnormal; Notable for the following components:       Result Value    Glucose 129 (*)     All other components within normal limits    Narrative:     GFR Categories in Chronic Kidney Disease (CKD)      GFR Category          GFR (mL/min/1.73)    Interpretation  G1                     90 or greater         Normal or high (1)  G2                      60-89                Mild decrease (1)  G3a                   45-59                Mild to moderate decrease  G3b                   30-44                Moderate to severe decrease  G4                    15-29                Severe decrease  G5                    14 or less           Kidney failure          (1)In the absence of evidence of kidney disease, neither GFR category G1 or G2 fulfill the criteria for CKD.    eGFR calculation 2021 CKD-EPI creatinine equation, which does not include race as a factor   LIPASE - Abnormal; Notable for the following components:    Lipase 1,367 (*)     All other components within normal limits   URINALYSIS W/ CULTURE IF INDICATED - Abnormal; Notable for the following components:    Specific Gravity, UA 1.090 (*)     All other components within normal limits    Narrative:     In absence of clinical symptoms, the presence of pyuria, bacteria, and/or nitrites on the urinalysis result does not correlate with infection.  Urine microscopic not indicated.   MAGNESIUM - Normal   CBC WITH AUTO DIFFERENTIAL - Normal   CBC AND DIFFERENTIAL    Narrative:     The following orders were created for panel order CBC & Differential.  Procedure                               Abnormality         Status                     ---------                               -----------         ------                     CBC Auto Differential[[id number]]        Normal              Final result                 Please view results for these tests  on the individual orders.       LOC: Person, Place, Time, and Situation    Telemetry:  Med/Surg    Cardiac Monitoring Ordered: no    EKG:   No orders to display       Medications Given in the ED:   Medications   sodium chloride 0.9 % flush 10 mL (has no administration in time range)   HYDROmorphone (DILAUDID) injection 0.5 mg (0.5 mg Intravenous Given 12/24/24 1637)   ondansetron (ZOFRAN) injection 4 mg (4 mg Intravenous Given 12/24/24 1636)   sodium chloride 0.9 % bolus 500 mL (0 mL Intravenous Stopped 12/24/24 1717)   iopamidol (ISOVUE-370) 76 % injection 100 mL (100 mL Intravenous Given 12/24/24 1705)   sodium chloride 0.9 % bolus 500 mL (0 mL Intravenous Stopped 12/24/24 1811)   HYDROmorphone (DILAUDID) injection 0.5 mg (0.5 mg Intravenous Given 12/24/24 1842)       Imaging results:  CT Abdomen Pelvis With Contrast    Result Date: 12/24/2024  Impression: Findings consistent with acute pancreatitis involving the pancreatic head. No evidence of necrosis, pancreatic ductal dilation or formation of fluid collection. Electronically Signed: Ivan Link DO  12/24/2024 5:13 PM EST  Workstation ID: [id number]     Social issues:   Social History     Socioeconomic History    Marital status:    Tobacco Use    Smoking status: Every Day     Current packs/day: 1.00     Average packs/day: 1 pack/day for 34.6 years (34.6 ttl pk-yrs)     Types: Cigarettes     Start date: 6/5/1990    Smokeless tobacco: Never   Vaping Use    Vaping status: Never Used   Substance and Sexual Activity    Alcohol use: Never    Drug use: Never    Sexual activity: Defer       NIH Stroke Scale:  Interval: (not recorded)  1a. Level of Consciousness: (not recorded)  1b. LOC Questions: (not recorded)  1c. LOC Commands: (not recorded)  2. Best Gaze: (not recorded)  3. Visual: (not recorded)  4. Facial Palsy: (not recorded)  5a. Motor Arm, Left: (not recorded)  5b. Motor Arm, Right: (not recorded)  6a. Motor Leg, Left: (not recorded)  6b. Motor  Leg, Right: (not recorded)  7. Limb Ataxia: (not recorded)  8. Sensory: (not recorded)  9. Best Language: (not recorded)  10. Dysarthria: (not recorded)  11. Extinction and Inattention (formerly Neglect): (not recorded)    Total (NIH Stroke Scale): (not recorded)     Additional notable assessment information:     Nursing report ED to floor:    Moody Canseco RN   12/24/24 19:34 EST     Electronically signed by Moody Canseco RN at 12/24/24 1934       Moody Canseco RN at 12/24/24 1734          Patient reports she still cannot give a urine sample. Provider notified    Electronically signed by Moody Canseco RN at 12/24/24 1735       Kae Peralta APRN at 12/24/24 1615          Subjective   History of Present Illness  39-year-old female with history of endometriosis, hyperlipidemia, hypertension, chronic pancreatitis presents the ED with complaints of right upper quadrant abdominal pain that radiates to the right flank area started approximately an hour ago along with nausea without vomiting.  Patient reports she has a history of chronic pancreatitis and this feels similar to a pancreatic flare.  Does have a history of hysterectomy and cholecystectomy.  Denies UTI symptoms, fever, chest pain or shortness of breath, diarrhea or vomiting, cough or congestion    PCP: Bella VERNOICA        Review of Systems   Constitutional:  Negative for fever.   Respiratory:  Negative for shortness of breath.    Cardiovascular:  Negative for chest pain.   Gastrointestinal:  Positive for abdominal pain and nausea. Negative for diarrhea and vomiting.   Genitourinary:  Negative for dysuria and frequency.   Musculoskeletal:  Positive for back pain.       Past Medical History:   Diagnosis Date    Endometriosis     Fibromyalgia     Hyperlipidemia     Hypertension        Allergies   Allergen Reactions    Roxicodone [Oxycodone Hcl] Itching and Nausea Only    Toradol [Ketorolac Tromethamine] Itching and Irritability    Gabapentin Nausea  And Vomiting    Naproxen Nausea And Vomiting    Pregabalin Hives    Morphine Itching       Past Surgical History:   Procedure Laterality Date    ABDOMINAL SURGERY       SECTION      CHOLECYSTECTOMY WITH INTRAOPERATIVE CHOLANGIOGRAM N/A 2022    Procedure: CHOLECYSTECTOMY LAPAROSCOPIC INTRAOPERATIVE CHOLANGIOGRAM;  Surgeon: Robert Dale MD;  Location: [facility] MAIN OR;  Service: General;  Laterality: N/A;    COLONOSCOPY N/A 3/7/2023    Procedure: COLONOSCOPY with ileum and large intestine random biopsies R/O microscopic colitis;  Surgeon: NAVIN Schneider MD;  Location: [facility] ENDOSCOPY;  Service: Gastroenterology;  Laterality: N/A;  hemorrhoids    ENDOSCOPY N/A 2022    Procedure: ESOPHAGOGASTRODUODENOSCOPY WITH BIOPSY;  Surgeon: NAVIN Schneider MD;  Location: [facility] ENDOSCOPY;  Service: Gastroenterology;  Laterality: N/A;    HYSTERECTOMY      UPPER ENDOSCOPIC ULTRASOUND W/ FNA N/A 2023    Procedure: ESOPHAGOGASTRODUODENOSCOPY WITH ENDOSCOPIC ULTRASOUND;  Surgeon: Malik King MD;  Location: [facility] ENDOSCOPY;  Service: Gastroenterology;  Laterality: N/A;  POST: CHRONIC PANCREATITIS       Family History   Problem Relation Age of Onset    Cancer Mother     Heart disease Father     Pancreatic cancer Sister        Social History     Socioeconomic History    Marital status:    Tobacco Use    Smoking status: Every Day     Current packs/day: 1.00     Average packs/day: 1 pack/day for 34.6 years (34.6 ttl pk-yrs)     Types: Cigarettes     Start date: 1990    Smokeless tobacco: Never   Vaping Use    Vaping status: Never Used   Substance and Sexual Activity    Alcohol use: Never    Drug use: Never    Sexual activity: Defer           Objective   Physical Exam  Vitals reviewed.   HENT:      Head: Normocephalic.   Eyes:      Extraocular Movements: Extraocular movements intact.      Conjunctiva/sclera: Conjunctivae normal.   Cardiovascular:      Rate and Rhythm: Normal rate and regular  "rhythm.      Pulses: Normal pulses.      Heart sounds: Normal heart sounds.   Pulmonary:      Effort: Pulmonary effort is normal.      Breath sounds: Normal breath sounds. No wheezing.   Abdominal:      General: Bowel sounds are normal.      Palpations: Abdomen is soft.      Tenderness: There is abdominal tenderness. There is no right CVA tenderness or left CVA tenderness.      Comments: Mild tenderness to left upper quadrant, no significant tenderness to the right upper quadrant with palpation.  No peritonitis or rigidity   Musculoskeletal:         General: Normal range of motion.   Skin:     General: Skin is warm and dry.   Neurological:      Mental Status: She is alert and oriented to person, place, and time.   Psychiatric:         Mood and Affect: Mood normal.         Behavior: Behavior normal.         Procedures          ED Course  ED Course as of 12/24/24 1935   Tue Dec 24, 2024   1742 Multiple attempts to obtain UA but patient states she urinated PTA and is unable to go at this time [KB]   1917 Spoke  [KB]   1921 Spoke to Marizol IRAHETA with the hospitalist group who agreed to admit patient [KB]      ED Course User Index  [KB] Kae Peralta, JAMIL      /70   Pulse 76   Temp 97.1 °F (36.2 °C) (Oral)   Resp 18   Ht 165.1 cm (65\")   Wt 90.1 kg (198 lb 10.2 oz)   SpO2 99%   BMI 33.05 kg/m²   Labs Reviewed   COMPREHENSIVE METABOLIC PANEL - Abnormal; Notable for the following components:       Result Value    Glucose 129 (*)     All other components within normal limits    Narrative:     GFR Categories in Chronic Kidney Disease (CKD)      GFR Category          GFR (mL/min/1.73)    Interpretation  G1                     90 or greater         Normal or high (1)  G2                      60-89                Mild decrease (1)  G3a                   45-59                Mild to moderate decrease  G3b                   30-44                Moderate to severe decrease  G4                    15-29                " Severe decrease  G5                    14 or less           Kidney failure          (1)In the absence of evidence of kidney disease, neither GFR category G1 or G2 fulfill the criteria for CKD.    eGFR calculation 2021 CKD-EPI creatinine equation, which does not include race as a factor   LIPASE - Abnormal; Notable for the following components:    Lipase 1,367 (*)     All other components within normal limits   URINALYSIS W/ CULTURE IF INDICATED - Abnormal; Notable for the following components:    Specific Gravity, UA 1.090 (*)     All other components within normal limits    Narrative:     In absence of clinical symptoms, the presence of pyuria, bacteria, and/or nitrites on the urinalysis result does not correlate with infection.  Urine microscopic not indicated.   MAGNESIUM - Normal   CBC WITH AUTO DIFFERENTIAL - Normal   CBC AND DIFFERENTIAL    Narrative:     The following orders were created for panel order CBC & Differential.  Procedure                               Abnormality         Status                     ---------                               -----------         ------                     CBC Auto Differential[[id number]]        Normal              Final result                 Please view results for these tests on the individual orders.     Medications   sodium chloride 0.9 % flush 10 mL (has no administration in time range)   HYDROmorphone (DILAUDID) injection 0.5 mg (0.5 mg Intravenous Given 12/24/24 1637)   ondansetron (ZOFRAN) injection 4 mg (4 mg Intravenous Given 12/24/24 1636)   sodium chloride 0.9 % bolus 500 mL (0 mL Intravenous Stopped 12/24/24 1717)   iopamidol (ISOVUE-370) 76 % injection 100 mL (100 mL Intravenous Given 12/24/24 1705)   sodium chloride 0.9 % bolus 500 mL (0 mL Intravenous Stopped 12/24/24 1811)   HYDROmorphone (DILAUDID) injection 0.5 mg (0.5 mg Intravenous Given 12/24/24 1842)     CT Abdomen Pelvis With Contrast    Result Date: 12/24/2024  Impression: Findings consistent  with acute pancreatitis involving the pancreatic head. No evidence of necrosis, pancreatic ductal dilation or formation of fluid collection. Electronically Signed: Ivan DO Nikolay  12/24/2024 5:13 PM EST  Workstation ID: [id number]                                                    Medical Decision Making  Patient was seen for the above complaints.  IV was established and blood work was obtained to assess lipase, infection, electrolyte abnormalities.  White blood cell count 8.87, hemoglobin 13.2, mag 2, electrolytes fairly within normal limits, lipase elevated at 1367.  UA was obtained, this was negative for UTI.  Abdominal CT was obtained and independently interpreted by the radiologist as:  Findings consistent with acute pancreatitis involving the pancreatic head. No evidence of necrosis, pancreatic ductal dilation or formation of fluid collection.    Patient was given 1 L normal saline, Zofran and Dilaudid.  On reassessment reports that her nausea has subsided and still is having some intermittent pain.  Due to acute pancreatitis, patient to be admitted to the hospitalist service for further evaluation and management.  Discussed case with Marizol NEGRON with the hospitalist group who agrees to admit patient.  Discussed plan of care with patient who verbalized understanding was agreeable with plan of care at this time.    Based on the clinical findings at this time I anticipate the patient will require a 2 midnight stay    Problems Addressed:  Acute pancreatitis, unspecified complication status, unspecified pancreatitis type: complicated acute illness or injury  Nausea without vomiting: complicated acute illness or injury  RUQ abdominal pain: complicated acute illness or injury    Amount and/or Complexity of Data Reviewed  Labs: ordered. Decision-making details documented in ED Course.  Radiology: ordered and independent interpretation performed. Decision-making details documented in ED  Course.    Risk  Prescription drug management.  Decision regarding hospitalization.        Final diagnoses:   Acute pancreatitis, unspecified complication status, unspecified pancreatitis type   RUQ abdominal pain   Nausea without vomiting       ED Disposition  ED Disposition       ED Disposition   Decision to Admit    Condition   --    Comment   Level of Care: Med/Surg [1]   Admitting Physician: LLANES ALVAREZ, CARLOS [[id number]]   Attending Physician: LLANES ALVAREZ, CARLOS [[id number]]                 No follow-up provider specified.       Medication List      No changes were made to your prescriptions during this visit.            Kae Peralta APRN  24 193      Electronically signed by Kae Peralta APRN at 24 193       Operative/Procedure Notes (last 72 hours)  Notes from 24 1230 through 24 1230   No notes of this type exist for this encounter.          Physician Progress Notes (last 72 hours)        Steph Benavides MD at 24 1116              [facility] MEDICINE SERVICE  DAILY PROGRESS NOTE    NAME: Dianna Claros  : 1975  MRN: [id number]      LOS: 1 day     PROVIDER OF SERVICE: Steph Benavides MD    Chief Complaint: Acute pancreatitis    Subjective:     Interval History:  History taken from: patient    Complaining of abdominal pain    Review of Systems:   Review of Systems   All other systems reviewed and are negative.      Objective:     Vital Signs  Temp:  [97.1 °F (36.2 °C)-98.7 °F (37.1 °C)] 97.5 °F (36.4 °C)  Heart Rate:  [63-81] 69  Resp:  [16-20] 16  BP: ()/(57-74) 99/57   Body mass index is 33.05 kg/m².    Physical Exam  Physical Exam  Constitutional:       Appearance: Normal appearance.   HENT:      Head: Normocephalic and atraumatic.      Nose: Nose normal.      Mouth/Throat:      Mouth: Mucous membranes are moist.   Eyes:      Extraocular Movements: Extraocular movements intact.      Pupils: Pupils are equal, round, and reactive to light.    Cardiovascular:      Rate and Rhythm: Normal rate and regular rhythm.   Pulmonary:      Effort: Pulmonary effort is normal.      Breath sounds: Normal breath sounds.   Abdominal:      General: Abdomen is flat. Bowel sounds are normal.      Palpations: Abdomen is soft.      Tenderness: There is abdominal tenderness. There is no guarding.   Musculoskeletal:         General: Normal range of motion.      Cervical back: Normal range of motion and neck supple.   Skin:     General: Skin is warm and dry.   Neurological:      General: No focal deficit present.      Mental Status: She is alert and oriented to person, place, and time.   Psychiatric:         Mood and Affect: Mood normal.         Behavior: Behavior normal.         Thought Content: Thought content normal.         Judgment: Judgment normal.         Current Medications:  Scheduled Meds:[Held by provider] cholestyramine light, 1 packet, Oral, Daily  [Held by provider] dicyclomine, 20 mg, Oral, 4x Daily  [Held by provider] famotidine, 40 mg, Oral, Nightly  [Held by provider] nortriptyline, 10 mg, Oral, Nightly  [Held by provider] pancrelipase (Lip-Prot-Amyl), 24,000 units of lipase, Oral, TID With Meals      Continuous Infusions:sodium chloride, 100 mL/hr, Last Rate: 100 mL/hr (12/25/24 0845)      PRN Meds:.  [Held by provider] acetaminophen    HYDROmorphone    [Held by provider] hyoscyamine    ondansetron    [COMPLETED] Insert Peripheral IV **AND** sodium chloride    traMADol       Diagnostic Data    Results from last 7 days   Lab Units 12/25/24  0511 12/24/24  1630   WBC 10*3/mm3 6.64 8.87   HEMOGLOBIN g/dL 12.5 13.2   HEMATOCRIT % 40.0 40.0   PLATELETS 10*3/mm3 208 209   GLUCOSE mg/dL 76 129*   CREATININE mg/dL 0.77 0.88   BUN mg/dL 8 11   SODIUM mmol/L 141 142   POTASSIUM mmol/L 3.9 3.9   AST (SGOT) U/L  --  18   ALT (SGPT) U/L  --  14   ALK PHOS U/L  --  108   BILIRUBIN mg/dL  --  0.3   ANION GAP mmol/L 8.3 11.9       CT Abdomen Pelvis With Contrast    Result  Date: 12/24/2024  Impression: Findings consistent with acute pancreatitis involving the pancreatic head. No evidence of necrosis, pancreatic ductal dilation or formation of fluid collection. Electronically Signed: Ivan DO Nikolay  12/24/2024 5:13 PM EST  Workstation ID: [id number]       I reviewed the patient's new clinical results.    Assessment/Plan:     Active and Resolved Problems  Active Hospital Problems    Diagnosis  POA    **Acute pancreatitis [K85.90]  Yes    Hypertension [I10]  Yes    Major depressive disorder, recurrent, moderate [F33.1]  Yes    Tobacco dependence syndrome [F17.200]  Yes    Irritable bowel syndrome [K58.9]  Yes    Hypothyroidism [E03.9]  Yes    Fibromyalgia [M79.7]  Yes      Resolved Hospital Problems   No resolved problems to display.       Acute on chronic pancreatitis  - Still complaining of abdominal pain.  Continue IV fluids, continue n.p.o., lipase trending downwards, continue current pain medications.    Depression  - Restart home dose of nortriptyline    Irritable bowel syndrome  - Continue home dose of Bentyl    VTE Prophylaxis:  No VTE prophylaxis order currently exists.       Disposition Planning:     Barriers to Discharge: Pending clinical improved  Anticipated Date of Discharge: 12/30/2024  Place of Discharge: Home      There are no questions and answers to display.       Signature: Electronically signed by Steph Benavides MD, 12/25/24, 11:16 EST.  [facility] Hospitalist Team     Electronically signed by Steph Benavides MD at 12/25/24 1120          Consult Notes (last 72 hours)        Chelita Burr APRN at 12/25/24 1055        Consult Orders    1. Inpatient Gastroenterology Consult [[id number]] ordered by Marizol Mojica APRN at 12/24/24 2005              Attestation signed by Sudhakar Queen MD at 12/25/24 1252    The patient was seen and examined with gastroenterology advanced practice provider, Chelita Burr. I personally performed  the substantive portion of the history of presenting illness.  I also performed the physical exam and the medical decision making.    49-year-old female with chronic pancreatitis status postcholecystectomy with no history of excessive alcohol use, negative autoimmune workup, negative triglycerides presenting with acute epigastric abdominal pain rated the back with nausea similar to previous pancreatitis episodes.  Endoscopic ultrasound shows moderate pancreatitis but no pancreas divisum or mass.  On exam she is in no acute distress without jaundice.  No stigmata for chronic liver disease.  She has epigastric tenderness that rebound or guarding.  Lipase 856, liver enzymes normal, WBC 6.6, Hgb 12.5  CT shows peripancreatic inflammation without abscess  Impression:  Acute pancreatitis without obvious precipitating factor likely from chronic pancreatitis  Chronic pancreatitis with negative workup with only risk factor being chronic tobacco use  Tobacco use disorder  History of cholecystectomy  Plan:  IV fluids, pain control  Patient was counseled again on tobacco cessation  Avoid medications associated with pancreatitis  Will monitor  When pain improves, may advance to low-fat diet      Electronically signed by Sudhakar Queen MD, 12/25/24, 12:50 PM EST.                           GI CONSULT  NOTE:    Referring Provider:  Dr Benavides    Chief complaint: abd pain    Subjective .     History of present illness: Dianna Claros is a 49 y.o. female history of c diff, hypertension, hyperlipidemia, endometriosis, fibromyalgia and several episodes of recurrent acute pancreatitis, prior cholecystectomy presenting with abd pain. Several hospitalizations with abdominal pain and acute on chronic pancreatitis. Had cholecystectomy 6/2022. Had another bout of pancreatitis after this. ELLE and IgG4 were negative. Continues to smoke cigarettes. She does not drink alcohol. Triglycerides have been elevated ~200 range. EUS with  moderate chronic panc. She had negative MRCP .  She is closely followed in our office and was last seen about 2 months ago.  At this time, nortriptyline was increased to 50 mg.  She had a pseudocyst on CT scan 2024 which was decreasing in size.    Patient reports beginning to have severe abdominal pain yesterday in her epigastric/right upper quadrant that feels similar to prior acute pancreatitis episodes.  She denies any specific trigger that set off the pain.  She does complain of nausea, but has not vomited.  She continues to smoke cigarettes, but is working on decreasing this.  She is currently smoking 1/2 pack/day.  She has diarrhea at baseline which is usually well-controlled with WelChol and Zenpep.  She states that her pain is currently a 5/10 on the pain scale.  She feels thirsty.  No new medications recently.    Endo History:  2023 EGD/EUS (Dr. King) - moderate chronic pancreatitis, no evidence of pancreatic divism, chronic gastritis, s/p krystyna. No evidence of autoimmune pancreatitis. Biopsy not performed d/t low yield  3/23 colonoscopy (Jennie) - prominent/mildy erythematous IC valve but normal TI, small nonbleeding internal hemorrhoids; bx acute and chronic ileal inflammation, no crypt abscesses or cryptitis. benign colon  2022 EGD by Dr. Schneider -mild chronic gastritis negative for H pylori    Past Medical History:  Past Medical History:   Diagnosis Date    Endometriosis     Fibromyalgia     Hyperlipidemia     Hypertension        Past Surgical History:  Past Surgical History:   Procedure Laterality Date    ABDOMINAL SURGERY       SECTION      CHOLECYSTECTOMY WITH INTRAOPERATIVE CHOLANGIOGRAM N/A 2022    Procedure: CHOLECYSTECTOMY LAPAROSCOPIC INTRAOPERATIVE CHOLANGIOGRAM;  Surgeon: Robert Dale MD;  Location: [facility] MAIN OR;  Service: General;  Laterality: N/A;    COLONOSCOPY N/A 3/7/2023    Procedure: COLONOSCOPY with ileum and large intestine random biopsies R/O  microscopic colitis;  Surgeon: NAVIN Schneider MD;  Location: [facility] ENDOSCOPY;  Service: Gastroenterology;  Laterality: N/A;  hemorrhoids    ENDOSCOPY N/A 5/30/2022    Procedure: ESOPHAGOGASTRODUODENOSCOPY WITH BIOPSY;  Surgeon: NAVIN Schneider MD;  Location: [facility] ENDOSCOPY;  Service: Gastroenterology;  Laterality: N/A;    HYSTERECTOMY      UPPER ENDOSCOPIC ULTRASOUND W/ FNA N/A 12/4/2023    Procedure: ESOPHAGOGASTRODUODENOSCOPY WITH ENDOSCOPIC ULTRASOUND;  Surgeon: Malik King MD;  Location: [facility] ENDOSCOPY;  Service: Gastroenterology;  Laterality: N/A;  POST: CHRONIC PANCREATITIS       Social History:  Social History     Tobacco Use    Smoking status: Every Day     Current packs/day: 1.00     Average packs/day: 1 pack/day for 34.6 years (34.6 ttl pk-yrs)     Types: Cigarettes     Start date: 6/5/1990    Smokeless tobacco: Never   Vaping Use    Vaping status: Never Used   Substance Use Topics    Alcohol use: Never    Drug use: Never       Family History:  Family History   Problem Relation Age of Onset    Cancer Mother     Heart disease Father     Pancreatic cancer Sister        Medications:  Medications Prior to Admission   Medication Sig Dispense Refill Last Dose/Taking    acetaminophen (TYLENOL) 325 MG tablet Take 2 tablets by mouth Every 6 (Six) Hours As Needed for Mild Pain.   12/24/2024    colesevelam (WELCHOL) 625 MG tablet Take 1 tablet by mouth 2 (Two) Times a Day With Meals.   Past Week    dicyclomine (BENTYL) 20 MG tablet Take 1 tablet by mouth Every 6 (Six) Hours As Needed. for pain   12/24/2024    famotidine (PEPCID) 40 MG tablet Take 1 tablet by mouth Every Night.   12/23/2024    hyoscyamine (LEVSIN) 0.125 MG SL tablet Place 1 tablet under the tongue Every 6 (Six) Hours As Needed for Cramping.   12/24/2024    nortriptyline (PAMELOR) 10 MG capsule Take 1 capsule by mouth Every Night.   12/23/2024    ondansetron (ZOFRAN) 4 MG tablet Take 1 tablet by mouth Every 8 (Eight) Hours As Needed for  Nausea or Vomiting. 20 tablet 0 12/24/2024    ondansetron ODT (ZOFRAN-ODT) 4 MG disintegrating tablet Place 1 tablet on the tongue Every 8 (Eight) Hours As Needed for Nausea or Vomiting.   12/24/2024    Pancrelipase, Lip-Prot-Amyl, (Zenpep) 96276-72429 units capsule delayed-release particles Take 2 capsules by mouth 3 (Three) Times a Day With Meals.   12/24/2024       Scheduled Meds:[Held by provider] cholestyramine light, 1 packet, Oral, Daily  [Held by provider] dicyclomine, 20 mg, Oral, 4x Daily  [Held by provider] famotidine, 40 mg, Oral, Nightly  [Held by provider] nortriptyline, 10 mg, Oral, Nightly  [Held by provider] pancrelipase (Lip-Prot-Amyl), 24,000 units of lipase, Oral, TID With Meals      Continuous Infusions:sodium chloride, 100 mL/hr, Last Rate: 100 mL/hr (12/25/24 0845)      PRN Meds:.  [Held by provider] acetaminophen    HYDROmorphone    [Held by provider] hyoscyamine    ondansetron    [COMPLETED] Insert Peripheral IV **AND** sodium chloride    ALLERGIES:  Roxicodone [oxycodone hcl], Toradol [ketorolac tromethamine], Gabapentin, Naproxen, Pregabalin, and Morphine    ROS:  The following systems were reviewed and negative;   Constitution:  No fevers, chills, no unintentional weight loss  Skin: no rash, no jaundice  Eyes:  No blurry vision, no eye pain  HENT:  No change in hearing or smell  Resp:  No dyspnea or cough  CV:  No chest pain or palpitations  :  No dysuria, hematuria  Musculoskeletal:  No leg cramps or arthralgias  Neuro:  No tremor, no numbness  Psych:  No depression or confusion    Objective     Vital Signs:   Vitals:    12/24/24 2300 12/25/24 0100 12/25/24 0505 12/25/24 0743   BP:  126/71 96/74 99/57   BP Location:  Right arm Right arm Right arm   Patient Position:  Lying Lying Lying   Pulse:  63 64 69   Resp:  18 16 16   Temp: 98.2 °F (36.8 °C)  98.4 °F (36.9 °C) 97.5 °F (36.4 °C)   TempSrc: Oral  Oral Oral   SpO2:  95% 98%    Weight:       Height:           Physical Exam:  "    General Appearance:    Awake and alert, in no acute distress   Head:    Normocephalic, without obvious abnormality, atraumatic   Throat:   No oral lesions, no thrush, oral mucosa moist   Lungs:     Respirations regular, even and unlabored   Chest Wall:    No abnormalities observed   Abdomen:     Soft, mild epigastric tpp, no rebound or guarding, non-distended, no hepatosplenomegaly   Rectal:     Deferred   Extremities:   Moves all extremities, no edema, no cyanosis   Pulses:   Pulses palpable and equal bilaterally   Skin:   No rash, no jaundice, normal palpation       Neurologic:   Cranial nerves 2 - 12 grossly intact, no asterixis       Results Review:   I reviewed the patient's labs and imaging.  Results from last 7 days   Lab Units 12/25/24  0511 12/24/24  1630   WBC 10*3/mm3 6.64 8.87   HEMOGLOBIN g/dL 12.5 13.2   PLATELETS 10*3/mm3 208 209     Results from last 7 days   Lab Units 12/25/24  0511 12/24/24  1630   SODIUM mmol/L 141 142   POTASSIUM mmol/L 3.9 3.9   CHLORIDE mmol/L 108* 105   CO2 mmol/L 24.7 25.1   BUN mg/dL 8 11   CREATININE mg/dL 0.77 0.88   GLUCOSE mg/dL 76 129*   ALBUMIN g/dL  --  4.2   BILIRUBIN mg/dL  --  0.3   ALK PHOS U/L  --  108   AST (SGOT) U/L  --  18   ALT (SGPT) U/L  --  14   MAGNESIUM mg/dL  --  2.0   LIPASE U/L 856* 1,367*     Estimated Creatinine Clearance: 100.1 mL/min (by C-G formula based on SCr of 0.77 mg/dL).  No results found for: \"HGBA1C\"      Infection     UA    Results from last 7 days   Lab Units 12/24/24  1811   NITRITE UA  Negative     Microbiology Results (last 10 days)       ** No results found for the last 240 hours. **          Imaging Results (Last 72 Hours)       Procedure Component Value Units Date/Time    CT Abdomen Pelvis With Contrast [[id number]] Collected: 12/24/24 1708     Updated: 12/24/24 1715    Narrative:      CT ABDOMEN PELVIS W CONTRAST    Date of Exam: 12/24/2024 5:04 PM EST    Indication: RUQ abd pain radiating to R flank, hx " pancreatitis.    Comparison: 12/7/2024    Technique: Axial CT images were obtained of the abdomen and pelvis following the uneventful intravenous administration of iodinated contrast. Sagittal and coronal reconstructions were performed.  Automated exposure control and iterative reconstruction   methods were used.        Findings:  Visualized Chest:  The visualized lung bases and lower mediastinal structures are unremarkable.    Liver: Liver is normal in size and CT density. No focal lesions.    Gallbladder: Status post cholecystectomy    Bile Ducts: No billiary dcutal dilation.    Spleen: Spleen is normal in size and CT density.    Pancreas: Edema and fat stranding surrounding the pancreatic head. No ductal dilation. No definite evidence of necrosis.    Adrenals: Adrenal glands are unremarkable.    Kidneys: Kidneys are normal in size. There are no stones or hydronephrosis.    Gastrointestinal: Unremarkable.    Bladder: The bladder is normal.    Pelvis:  No suspecious mass.    Peritoneum/Mesentery: No fluid collection, ascities, or free air.      Lymph Nodes: No lymphadenopathy.    Vasculature: Unremarkable    Abdominal Wall: Unremarkable    Bony Structures: No acute osseous abnormality      Impression:      Impression:  Findings consistent with acute pancreatitis involving the pancreatic head. No evidence of necrosis, pancreatic ductal dilation or formation of fluid collection.            Electronically Signed: Ivan Link DO    12/24/2024 5:13 PM EST    Workstation ID: [id number]            ASSESSMENT AND PLAN:    49-year-old female presents to the hospital with acute on chronic pancreatitis.  Multiple hospitalizations/ER visits over the last year with abdominal pain.  She has had negative autoimmune workup, EUS showed chronic pancreatitis.  She continues to smoke cigarettes.    -Acute on chronic pancreatitis  -Epigastric/RUQ abdominal pain  -Elevated lipase  -Tobacco use disorder  -Chronic diarrhea  -History  of cholecystectomy    Plan  CT shows acute pancreatitis involving the pancreas head without fluid collection or necrosis.  Her lipase is 856, down from around 1300 yesterday.  Check CRP.  Continue supportive care with IV fluids for hydration, analgesics, antiemetics as needed.  Okay for clear liquid diet as tolerated.  Will add p.o. pain medications in an effort to decrease IV pain medication use.  If pain worsens, decrease back to strict n.p.o. diet.  Continue to trend labs.  LFTs are normal.  CBC unremarkable.  Resume pancreas enzymes when she resumes a diet.  She has a history of chronic diarrhea with negative random colon biopsies. 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  Well-controlled with use of WelChol and Zenpep. Ok to resume once she is eating again.   Discussed smoking cessation.  She is working on decreasing this.  Continue supportive care.    I discussed the patients findings and my recommendations with the patient.    We appreciate the referral    Electronically signed by JAMIL Malodnado, 12/25/24, 10:56 AM EST.               Electronically signed by Sudhakar Queen MD at 12/25/24 4877

## 2024-12-26 NOTE — PROGRESS NOTES
[facility] MEDICINE SERVICE  DAILY PROGRESS NOTE    NAME: Dianna Claros  : 1975  MRN: [id number]      LOS: 2 days     PROVIDER OF SERVICE: Steph Benavides MD    Chief Complaint: Acute pancreatitis    Subjective:     Interval History:  History taken from: patient    Complaining of abdominal pain    Review of Systems:   Review of Systems   All other systems reviewed and are negative.      Objective:     Vital Signs  Temp:  [97.8 °F (36.6 °C)-98.7 °F (37.1 °C)] 97.8 °F (36.6 °C)  Heart Rate:  [57-74] 57  Resp:  [12-14] 12  BP: (122-126)/(76-81) 126/81   Body mass index is 33.05 kg/m².    Physical Exam  Physical Exam  Constitutional:       Appearance: Normal appearance.   HENT:      Head: Normocephalic and atraumatic.      Nose: Nose normal.      Mouth/Throat:      Mouth: Mucous membranes are moist.   Eyes:      Extraocular Movements: Extraocular movements intact.      Pupils: Pupils are equal, round, and reactive to light.   Cardiovascular:      Rate and Rhythm: Normal rate and regular rhythm.   Pulmonary:      Effort: Pulmonary effort is normal.      Breath sounds: Normal breath sounds.   Abdominal:      General: Abdomen is flat. Bowel sounds are normal.      Palpations: Abdomen is soft.      Tenderness: There is abdominal tenderness. There is no guarding.   Musculoskeletal:         General: Normal range of motion.      Cervical back: Normal range of motion and neck supple.   Skin:     General: Skin is warm and dry.   Neurological:      General: No focal deficit present.      Mental Status: She is alert and oriented to person, place, and time.   Psychiatric:         Mood and Affect: Mood normal.         Behavior: Behavior normal.         Thought Content: Thought content normal.         Judgment: Judgment normal.         Current Medications:  Scheduled Meds:[Held by provider] cholestyramine light, 1 packet, Oral, Daily  dicyclomine, 20 mg, Oral, 4x Daily  famotidine, 40 mg, Oral,  Nightly  nortriptyline, 10 mg, Oral, Nightly  pancrelipase (Lip-Prot-Amyl), 24,000 units of lipase, Oral, TID With Meals      Continuous Infusions:sodium chloride, 100 mL/hr, Last Rate: 100 mL/hr (12/26/24 0843)      PRN Meds:.  [Held by provider] acetaminophen    HYDROmorphone    [Held by provider] hyoscyamine    ondansetron    [COMPLETED] Insert Peripheral IV **AND** sodium chloride       Diagnostic Data    Results from last 7 days   Lab Units 12/26/24  0240   WBC 10*3/mm3 5.83   HEMOGLOBIN g/dL 11.1*   HEMATOCRIT % 33.8*   PLATELETS 10*3/mm3 169   GLUCOSE mg/dL 78   CREATININE mg/dL 0.77   BUN mg/dL 6   SODIUM mmol/L 140   POTASSIUM mmol/L 4.0   AST (SGOT) U/L 60*   ALT (SGPT) U/L 78*   ALK PHOS U/L 112   BILIRUBIN mg/dL 0.7   ANION GAP mmol/L 7.4       No radiology results for the last day      I reviewed the patient's new clinical results.    Assessment/Plan:     Active and Resolved Problems  Active Hospital Problems    Diagnosis  POA    **Acute pancreatitis [K85.90]  Yes    Hypertension [I10]  Yes    Major depressive disorder, recurrent, moderate [F33.1]  Yes    Tobacco dependence syndrome [F17.200]  Yes    Irritable bowel syndrome [K58.9]  Yes    Hypothyroidism [E03.9]  Yes    Fibromyalgia [M79.7]  Yes      Resolved Hospital Problems   No resolved problems to display.       Acute on chronic pancreatitis  - Still complaining of abdominal pain.  Continue IV fluids, continue n.p.o., lipase trending downwards, continue current pain medications.    Depression  - Restart home dose of nortriptyline    Irritable bowel syndrome  - Continue home dose of Bentyl    VTE Prophylaxis:  No VTE prophylaxis order currently exists.       Disposition Planning:     Barriers to Discharge: Pending clinical improved  Anticipated Date of Discharge: 12/30/2024  Place of Discharge: Home      Code Status and Medical Interventions: CPR (Attempt to Resuscitate); Full Support   Ordered at: 12/26/24 0822     Code Status (Patient has no  pulse and is not breathing):    CPR (Attempt to Resuscitate)     Medical Interventions (Patient has pulse or is breathing):    Full Support       Signature: Electronically signed by Steph Benavides MD, 12/26/24, 17:21 EST.  [facility] Hospitalist Team

## 2024-12-26 NOTE — CASE MANAGEMENT/SOCIAL WORK
Discharge Planning Assessment   [facility]     Patient Name: Dianna Claros  MRN: [id number]  Today's Date: 12/26/2024    Admit Date: 12/24/2024    Plan: Return home with family   Discharge Needs Assessment       Row Name 12/26/24 1530       Living Environment    People in Home spouse;child(mayuri), dependent    Name(s) of People in Home joan Solis and 3 minor children    Current Living Arrangements home    Potentially Unsafe Housing Conditions none    In the past 12 months has the electric, gas, oil, or water company threatened to shut off services in your home? No    Primary Care Provided by self    Provides Primary Care For no one    Family Caregiver if Needed spouse    Family Caregiver Names  Will    Quality of Family Relationships helpful;involved;supportive    Able to Return to Prior Arrangements yes       Resource/Environmental Concerns    Resource/Environmental Concerns none    Transportation Concerns none       Transportation Needs    In the past 12 months, has lack of transportation kept you from medical appointments or from getting medications? no    In the past 12 months, has lack of transportation kept you from meetings, work, or from getting things needed for daily living? No       Food Insecurity    Within the past 12 months, you worried that your food would run out before you got the money to buy more. Never true    Within the past 12 months, the food you bought just didn't last and you didn't have money to get more. Never true       Transition Planning    Patient/Family Anticipates Transition to home with family    Patient/Family Anticipated Services at Transition none    Transportation Anticipated car, drives self;family or friend will provide       Discharge Needs Assessment    Readmission Within the Last 30 Days no previous admission in last 30 days    Equipment Currently Used at Home none    Concerns to be Addressed no discharge needs identified;denies needs/concerns at this time     Anticipated Changes Related to Illness none    Equipment Needed After Discharge none                   Discharge Plan       Row Name 12/26/24 1531       Plan    Plan Return home with family    Patient/Family in Agreement with Plan yes    Plan Comments CM met with pt at bedside to discuss discharge needs. Pt lives at home with  Will and 3 dependent children, drives and is IADLs. PCP and pharmacy verified- pt declined enrollment in [facility]. No issues stated affording food/medications or transport, and home environment is safe. No current DME/HHC and none anticipated on discharge. Family will provide transportation home.                  Demographic Summary       Row Name 12/26/24 153       General Information    Admission Type inpatient    Arrived From emergency department    Referral Source admission list    Reason for Consult discharge planning    Preferred Language English       Contact Information    Permission Granted to Share Info With                    Functional Status       Row Name 12/26/24 1530       Functional Status    Usual Activity Tolerance moderate    Current Activity Tolerance fair       Functional Status, IADL    Medications independent    Meal Preparation independent    Housekeeping independent    Laundry independent    Shopping independent       Mental Status    General Appearance WDL WDL       Mental Status Summary    Recent Changes in Mental Status/Cognitive Functioning no changes       Employment/    Current or Previous Occupation not applicable                 Sneha Esparza RN     Office phone: [phone]  Office fax: [phone]

## 2024-12-27 ENCOUNTER — READMISSION MANAGEMENT (OUTPATIENT)
Dept: CALL CENTER | Facility: HOSPITAL | Age: 49
End: 2024-12-27
Payer: COMMERCIAL

## 2024-12-27 VITALS
WEIGHT: 198.63 LBS | SYSTOLIC BLOOD PRESSURE: 102 MMHG | TEMPERATURE: 97.8 F | DIASTOLIC BLOOD PRESSURE: 67 MMHG | BODY MASS INDEX: 33.09 KG/M2 | OXYGEN SATURATION: 100 % | RESPIRATION RATE: 12 BRPM | HEART RATE: 71 BPM | HEIGHT: 65 IN

## 2024-12-27 LAB
ALBUMIN SERPL-MCNC: 3.4 G/DL (ref 3.5–5.2)
ALBUMIN/GLOB SERPL: 1.5 G/DL
ALP SERPL-CCNC: 113 U/L (ref 39–117)
ALT SERPL W P-5'-P-CCNC: 56 U/L (ref 1–33)
ANION GAP SERPL CALCULATED.3IONS-SCNC: 6.7 MMOL/L (ref 5–15)
AST SERPL-CCNC: 30 U/L (ref 1–32)
BASOPHILS # BLD AUTO: 0.01 10*3/MM3 (ref 0–0.2)
BASOPHILS NFR BLD AUTO: 0.2 % (ref 0–1.5)
BILIRUB SERPL-MCNC: 0.3 MG/DL (ref 0–1.2)
BUN SERPL-MCNC: 7 MG/DL (ref 6–20)
BUN/CREAT SERPL: 9.1 (ref 7–25)
CALCIUM SPEC-SCNC: 8.5 MG/DL (ref 8.6–10.5)
CHLORIDE SERPL-SCNC: 107 MMOL/L (ref 98–107)
CO2 SERPL-SCNC: 26.3 MMOL/L (ref 22–29)
CREAT SERPL-MCNC: 0.77 MG/DL (ref 0.57–1)
CRP SERPL-MCNC: 4.06 MG/DL (ref 0–0.5)
DEPRECATED RDW RBC AUTO: 43.6 FL (ref 37–54)
EGFRCR SERPLBLD CKD-EPI 2021: 95.9 ML/MIN/1.73
EOSINOPHIL # BLD AUTO: 0.16 10*3/MM3 (ref 0–0.4)
EOSINOPHIL NFR BLD AUTO: 2.5 % (ref 0.3–6.2)
ERYTHROCYTE [DISTWIDTH] IN BLOOD BY AUTOMATED COUNT: 12.7 % (ref 12.3–15.4)
GLOBULIN UR ELPH-MCNC: 2.3 GM/DL
GLUCOSE SERPL-MCNC: 100 MG/DL (ref 65–99)
HCT VFR BLD AUTO: 34.4 % (ref 34–46.6)
HGB BLD-MCNC: 10.7 G/DL (ref 12–15.9)
IMM GRANULOCYTES # BLD AUTO: 0.01 10*3/MM3 (ref 0–0.05)
IMM GRANULOCYTES NFR BLD AUTO: 0.2 % (ref 0–0.5)
LIPASE SERPL-CCNC: 49 U/L (ref 13–60)
LYMPHOCYTES # BLD AUTO: 2.16 10*3/MM3 (ref 0.7–3.1)
LYMPHOCYTES NFR BLD AUTO: 34.1 % (ref 19.6–45.3)
MCH RBC QN AUTO: 29.2 PG (ref 26.6–33)
MCHC RBC AUTO-ENTMCNC: 31.1 G/DL (ref 31.5–35.7)
MCV RBC AUTO: 93.7 FL (ref 79–97)
MONOCYTES # BLD AUTO: 0.38 10*3/MM3 (ref 0.1–0.9)
MONOCYTES NFR BLD AUTO: 6 % (ref 5–12)
NEUTROPHILS NFR BLD AUTO: 3.62 10*3/MM3 (ref 1.7–7)
NEUTROPHILS NFR BLD AUTO: 57 % (ref 42.7–76)
NRBC BLD AUTO-RTO: 0 /100 WBC (ref 0–0.2)
PLATELET # BLD AUTO: 166 10*3/MM3 (ref 140–450)
PMV BLD AUTO: 11.4 FL (ref 6–12)
POTASSIUM SERPL-SCNC: 4.2 MMOL/L (ref 3.5–5.2)
PROT SERPL-MCNC: 5.7 G/DL (ref 6–8.5)
RBC # BLD AUTO: 3.67 10*6/MM3 (ref 3.77–5.28)
SODIUM SERPL-SCNC: 140 MMOL/L (ref 136–145)
WBC NRBC COR # BLD AUTO: 6.34 10*3/MM3 (ref 3.4–10.8)

## 2024-12-27 PROCEDURE — 83690 ASSAY OF LIPASE: CPT | Performed by: INTERNAL MEDICINE

## 2024-12-27 PROCEDURE — 85025 COMPLETE CBC W/AUTO DIFF WBC: CPT | Performed by: NURSE PRACTITIONER

## 2024-12-27 PROCEDURE — 86140 C-REACTIVE PROTEIN: CPT | Performed by: INTERNAL MEDICINE

## 2024-12-27 PROCEDURE — 25010000002 HYDROMORPHONE PER 4 MG: Performed by: INTERNAL MEDICINE

## 2024-12-27 PROCEDURE — 80053 COMPREHEN METABOLIC PANEL: CPT | Performed by: INTERNAL MEDICINE

## 2024-12-27 RX ORDER — HYDROCODONE BITARTRATE AND ACETAMINOPHEN 5; 325 MG/1; MG/1
1 TABLET ORAL EVERY 6 HOURS PRN
Qty: 12 TABLET | Refills: 0 | Status: SHIPPED | OUTPATIENT
Start: 2024-12-27 | End: 2024-12-30

## 2024-12-27 RX ADMIN — HYDROMORPHONE HYDROCHLORIDE 0.5 MG: 1 INJECTION, SOLUTION INTRAMUSCULAR; INTRAVENOUS; SUBCUTANEOUS at 03:08

## 2024-12-27 RX ADMIN — HYDROMORPHONE HYDROCHLORIDE 0.5 MG: 1 INJECTION, SOLUTION INTRAMUSCULAR; INTRAVENOUS; SUBCUTANEOUS at 06:13

## 2024-12-27 RX ADMIN — DICYCLOMINE HYDROCHLORIDE 20 MG: 10 CAPSULE ORAL at 07:48

## 2024-12-27 RX ADMIN — HYDROMORPHONE HYDROCHLORIDE 0.5 MG: 1 INJECTION, SOLUTION INTRAMUSCULAR; INTRAVENOUS; SUBCUTANEOUS at 09:19

## 2024-12-27 RX ADMIN — PANCRELIPASE 24000 UNITS OF LIPASE: 60000; 12000; 38000 CAPSULE, DELAYED RELEASE PELLETS ORAL at 07:48

## 2024-12-27 NOTE — PLAN OF CARE
Goal Outcome Evaluation:      Pt is resting, vss, c/o pain, meds given. Ns@100ml/hr running. No other complaints, call light in reach, POC ongoing

## 2024-12-27 NOTE — DISCHARGE SUMMARY
[facility] Medicine Services  Discharge Summary    Date of Service: 2024  Patient Name: Dianna Clraos  : 1975  MRN: [id number]    Date of Admission: 2024  Discharge Diagnosis:     Acute on chronic pancreatitis  Depression  Irritable bowel syndrome    Date of Discharge: 2024  Primary Care Physician: Anita Blanco MD      Hospital Course       Hospital Course:    This patient is a 49-year-old lady who presented with complaints of right upper quadrant abdominal pain.  Patient has history of chronic pancreatitis.  CT scan of the abdomen was done and this showed findings consistent with acute pancreatitis. Lipase level was checked and was found to be 1,367.  She was diagnosed with acute on chronic pancreatitis and started on aggressive IV fluids, n.p.o. and IV as needed pain medications and antiemetics for nausea and vomiting.  She slowly but gradually improved.  Clear liquids were started which she tolerated and so her diet was advanced to full liquids and then to a fat restricted diet all of which she gradually tolerated.  Lipase level has normalized at this time.  She has improved overall with resolution of abdominal pain, nausea and vomiting.  She is being discharged home today.  Of note is that she was comanaged by gastroenterology during hospital course.    DISCHARGE Follow Up Recommendations:-Follow-up PCP 1 week, follow-up with gastroenterology in 1 week.        Day of Discharge     Vital Signs:  Temp:  [97.8 °F (36.6 °C)] 97.8 °F (36.6 °C)  Heart Rate:  [70-71] 71  Resp:  [12] 12  BP: ()/(47-68) 102/67    Physical Exam:  Physical Exam  Constitutional:       Appearance: Normal appearance.   HENT:      Head: Normocephalic and atraumatic.      Nose: Nose normal.      Mouth/Throat:      Mouth: Mucous membranes are moist.   Eyes:      Extraocular Movements: Extraocular movements intact.      Pupils: Pupils are equal, round, and reactive to light.    Cardiovascular:      Rate and Rhythm: Normal rate and regular rhythm.   Pulmonary:      Effort: Pulmonary effort is normal.      Breath sounds: Normal breath sounds.   Abdominal:      General: Abdomen is flat. Bowel sounds are normal.      Palpations: Abdomen is soft.   Musculoskeletal:         General: Normal range of motion.      Cervical back: Normal range of motion and neck supple.   Skin:     General: Skin is warm and dry.   Neurological:      General: No focal deficit present.      Mental Status: She is alert and oriented to person, place, and time.   Psychiatric:         Mood and Affect: Mood normal.         Behavior: Behavior normal.         Thought Content: Thought content normal.         Judgment: Judgment normal.            Pertinent  and/or Most Recent Results     LAB RESULTS:      Lab 12/27/24  0206 12/27/24  0200 12/26/24  0240 12/25/24  0511 12/24/24  1630   WBC 6.34  --  5.83 6.64 8.87   HEMOGLOBIN 10.7*  --  11.1* 12.5 13.2   HEMATOCRIT 34.4  --  33.8* 40.0 40.0   PLATELETS 166  --  169 208 209   NEUTROS ABS 3.62  --  3.17 3.89 5.99   IMMATURE GRANS (ABS) 0.01  --  0.02 0.02 0.03   LYMPHS ABS 2.16  --  2.06 2.17 2.17   MONOS ABS 0.38  --  0.43 0.45 0.54   EOS ABS 0.16  --  0.13 0.09 0.11   MCV 93.7  --  92.6 92.6 91.3   CRP  --  4.06* 2.54* 0.72*  --          Lab 12/27/24  0200 12/26/24  0240 12/25/24  0511 12/24/24  1630   SODIUM 140 140 141 142   POTASSIUM 4.2 4.0 3.9 3.9   CHLORIDE 107 107 108* 105   CO2 26.3 25.6 24.7 25.1   ANION GAP 6.7 7.4 8.3 11.9   BUN 7 6 8 11   CREATININE 0.77 0.77 0.77 0.88   EGFR 95.9 95.9 95.9 81.7   GLUCOSE 100* 78 76 129*   CALCIUM 8.5* 8.1* 8.4* 9.4   MAGNESIUM  --   --   --  2.0         Lab 12/27/24  0200 12/26/24  0240 12/25/24  0511 12/24/24  1630   TOTAL PROTEIN 5.7* 5.6*  --  6.8   ALBUMIN 3.4* 3.4*  --  4.2   GLOBULIN 2.3 2.2  --  2.6   ALT (SGPT) 56* 78*  --  14   AST (SGOT) 30 60*  --  18   BILIRUBIN 0.3 0.7  --  0.3   ALK PHOS 113 112  --  108   LIPASE 49  140* 856* 1,367*                     Brief Urine Lab Results  (Last result in the past 365 days)        Color   Clarity   Blood   Leuk Est   Nitrite   Protein   CREAT   Urine HCG        12/24/24 1811 Yellow   Clear   Negative   Negative   Negative   Negative                 Microbiology Results (last 10 days)       ** No results found for the last 240 hours. **            CT Abdomen Pelvis With Contrast    Result Date: 12/24/2024  Impression: Impression: Findings consistent with acute pancreatitis involving the pancreatic head. No evidence of necrosis, pancreatic ductal dilation or formation of fluid collection. Electronically Signed: Ivan Link DO  12/24/2024 5:13 PM EST  Workstation ID: [id number]    CT Abdomen Pelvis Without Contrast    Result Date: 12/7/2024  Impression: Impression: 1.No acute intra-abdominal or intrapelvic process. 2.Ancillary findings as described above. Electronically Signed: Farhana Tan MD  12/7/2024 10:18 PM EST  Workstation ID: [id number]                 Labs Pending at Discharge:  Pending Results       None            Procedures Performed           Consults:   Consults       Date and Time Order Name Status Description    12/24/2024  8:05 PM Inpatient Gastroenterology Consult Completed               Discharge Details        Discharge Medications        New Medications        Instructions Start Date   HYDROcodone-acetaminophen 5-325 MG per tablet  Commonly known as: NORCO   1 tablet, Oral, Every 6 Hours PRN             Continue These Medications        Instructions Start Date   colesevelam 625 MG tablet  Commonly known as: WELCHOL   625 mg, 2 Times Daily With Meals      dicyclomine 20 MG tablet  Commonly known as: BENTYL   20 mg, Every 6 Hours PRN      famotidine 40 MG tablet  Commonly known as: PEPCID   40 mg, Nightly      nortriptyline 10 MG capsule  Commonly known as: PAMELOR   10 mg, Nightly      ondansetron ODT 4 MG disintegrating tablet  Commonly known as: ZOFRAN-ODT   4 mg,  Every 8 Hours PRN      Zenpep 71024-49486 units capsule delayed-release particles  Generic drug: Pancrelipase (Lip-Prot-Amyl)   2 capsules, 3 Times Daily With Meals             Stop These Medications      acetaminophen 325 MG tablet  Commonly known as: TYLENOL     hyoscyamine 0.125 MG SL tablet  Commonly known as: LEVSIN     ondansetron 4 MG tablet  Commonly known as: ZOFRAN              Allergies   Allergen Reactions    Roxicodone [Oxycodone Hcl] Itching and Nausea Only    Toradol [Ketorolac Tromethamine] Itching and Irritability    Gabapentin Nausea And Vomiting    Naproxen Nausea And Vomiting    Pregabalin Hives    Morphine Itching         Discharge Disposition: Home  Home or Self Care    Diet: GI diet-fat restricted diet/low-fat diet        Discharge Activity:   Activity Instructions       Activity as Tolerated                CODE STATUS:  Code Status and Medical Interventions: CPR (Attempt to Resuscitate); Full Support   Ordered at: 12/26/24 0822     Code Status (Patient has no pulse and is not breathing):    CPR (Attempt to Resuscitate)     Medical Interventions (Patient has pulse or is breathing):    Full Support         Future Appointments   Date Time Provider Department Center   6/3/2025  9:00 AM Anita Blanco MD [facility]       Additional Instructions for the Follow-ups that You Need to Schedule       Discharge Follow-up with PCP   As directed       Currently Documented PCP:    Anita Blanco MD    PCP Phone Number:    [phone]     Follow Up Details: 1 week                Time spent on Discharge including face to face service: Greater than 30 minutes    Signature: Electronically signed by Steph Benavides MD, 12/27/24, 14:35 EST.  [facility] Hospitalist Team

## 2024-12-27 NOTE — PLAN OF CARE
Problem: Adult Inpatient Plan of Care  Goal: Plan of Care Review  Outcome: Met  Goal: Patient-Specific Goal (Individualized)  Outcome: Met  Goal: Absence of Hospital-Acquired Illness or Injury  Outcome: Met  Intervention: Identify and Manage Fall Risk  Recent Flowsheet Documentation  Taken 12/27/2024 0919 by Diana Oates RN  Safety Promotion/Fall Prevention:   safety round/check completed   room organization consistent   nonskid shoes/slippers when out of bed   clutter free environment maintained   assistive device/personal items within reach  Taken 12/27/2024 0750 by Diana Oates RN  Safety Promotion/Fall Prevention:   safety round/check completed   room organization consistent   clutter free environment maintained   assistive device/personal items within reach   nonskid shoes/slippers when out of bed  Taken 12/27/2024 0640 by Diana Oates RN  Safety Promotion/Fall Prevention:   safety round/check completed   room organization consistent   nonskid shoes/slippers when out of bed   clutter free environment maintained   assistive device/personal items within reach  Intervention: Prevent Skin Injury  Recent Flowsheet Documentation  Taken 12/27/2024 0750 by Diana Oates RN  Body Position: sitting up in bed  Intervention: Prevent Infection  Recent Flowsheet Documentation  Taken 12/27/2024 0750 by Diana Oates RN  Infection Prevention:   hand hygiene promoted   rest/sleep promoted  Goal: Optimal Comfort and Wellbeing  Outcome: Met  Intervention: Monitor Pain and Promote Comfort  Recent Flowsheet Documentation  Taken 12/27/2024 0919 by Diana Oates RN  Pain Management Interventions: pain medication given  Goal: Readiness for Transition of Care  Outcome: Met   Goal Outcome Evaluation:

## 2024-12-27 NOTE — OUTREACH NOTE
Prep Survey      Flowsheet Row Responses   [facility] patient discharged from? [facility]   Is LACE score < 7 ? No   Eligibility [facility]   Date of Admission 12/24/24   Date of Discharge 12/27/24   Discharge Disposition Home or Self Care   Discharge diagnosis Acute pancreatitis   Does the patient have one of the following disease processes/diagnoses(primary or secondary)? Other   Does the patient have Home health ordered? No   Is there a DME ordered? No   Medication alerts for this patient see AVS   Prep survey completed? Yes            Sneha HOLDEN - Registered Nurse

## 2024-12-27 NOTE — PLAN OF CARE
Goal Outcome Evaluation:     Problem: Adult Inpatient Plan of Care  Goal: Plan of Care Review  Outcome: Progressing  Goal: Patient-Specific Goal (Individualized)  Outcome: Progressing  Goal: Absence of Hospital-Acquired Illness or Injury  Outcome: Progressing  Intervention: Identify and Manage Fall Risk  Recent Flowsheet Documentation  Taken 12/26/2024 1611 by Mariana Ruff RN  Safety Promotion/Fall Prevention: safety round/check completed  Taken 12/26/2024 1400 by Mariana Ruff RN  Safety Promotion/Fall Prevention: safety round/check completed  Taken 12/26/2024 1200 by Mariana Ruff RN  Safety Promotion/Fall Prevention: safety round/check completed  Taken 12/26/2024 1013 by Mariana Ruff RN  Safety Promotion/Fall Prevention: safety round/check completed  Taken 12/26/2024 0842 by Mariana Ruff RN  Safety Promotion/Fall Prevention: safety round/check completed  Intervention: Prevent Skin Injury  Recent Flowsheet Documentation  Taken 12/26/2024 0842 by Mariana Ruff RN  Body Position: position changed independently  Intervention: Prevent and Manage VTE (Venous Thromboembolism) Risk  Recent Flowsheet Documentation  Taken 12/26/2024 0842 by Mariana Ruff RN  VTE Prevention/Management:   bilateral   SCDs (sequential compression devices) off  Intervention: Prevent Infection  Recent Flowsheet Documentation  Taken 12/26/2024 0842 by Mariana Ruff RN  Infection Prevention: single patient room provided  Goal: Optimal Comfort and Wellbeing  Outcome: Progressing  Intervention: Monitor Pain and Promote Comfort  Recent Flowsheet Documentation  Taken 12/26/2024 0842 by Mariana Ruff RN  Pain Management Interventions: pain medication given  Intervention: Provide Person-Centered Care  Recent Flowsheet Documentation  Taken 12/26/2024 0842 by Mariana Ruff RN  Trust Relationship/Rapport:   care explained   choices provided   emotional support provided   empathic  listening provided   questions answered  Goal: Readiness for Transition of Care  Outcome: Progressing   Pt has been relaxing in bed throughout the day. Her diet has been advanced as tolerated; pt has tolerated each advancement well. 0.9% NS at 100ml/hr was restarted continuously. Pt has also received Dilaudid PRN for pain and Zofran PRN for nausea. No concerns at this time and call light within reach.

## 2024-12-30 ENCOUNTER — TRANSITIONAL CARE MANAGEMENT TELEPHONE ENCOUNTER (OUTPATIENT)
Dept: CALL CENTER | Facility: HOSPITAL | Age: 49
End: 2024-12-30
Payer: COMMERCIAL

## 2024-12-30 NOTE — PAYOR COMM NOTE
"This is discharge notification for Natalia Pena.  Dc'd on 12/27/24 routine home.    AUTHORIZATION : [id number]     THANK YOU.      Renetta Francisco RN, [facility]  Utilization Nurse  [address] [address]   [phone]  Fx [phone]     Natalia Pena (49 y.o. Female)       Date of Birth   1975    Social Security Number       Address   [address] [place] [id number]    Home Phone   [phone]    MRN   [id number]       Sabianist   None    Marital Status                               Admission Date   12/24/24    Admission Type   Emergency    Admitting Provider   David Alvarado MD    Attending Provider       Department, Room/Bed   [facility] MEDICAL INPATIENT, 208/1       Discharge Date   12/27/2024    Discharge Disposition   Home or Self Care    Discharge Destination                                 Attending Provider: (none)   Allergies: Roxicodone [Oxycodone Hcl], Toradol [Ketorolac Tromethamine], Gabapentin, Naproxen, Pregabalin, Morphine    Isolation: None   Infection: None   Code Status: Prior    Ht: 165.1 cm (65\")   Wt: 90.1 kg (198 lb 10.2 oz)    Admission Cmt: None   Principal Problem: Acute pancreatitis [K85.90]                   Active Insurance as of 12/24/2024       Primary Coverage       Payor Plan Insurance Group Employer/Plan Group    LU[employer]NE BLUE [employer]OSS [employer] BLUE CROSS BLUE SHIELD PPO [id number]       Payor Plan Address Payor Plan Phone Number Payor Plan Fax Number Effective Dates    [address] [phone]  7/1/2024 - None Entered    [facility] [id number]         Subscriber Name Subscriber Birth Date Member ID       NATALIA PENA 1975 [id number]               Secondary Coverage       Payor Plan Insurance Group Employer/Plan Group    [employer] [place] [id number]       Payor Plan Address Payor Plan Phone Number Payor Plan Fax Number Effective Dates    MAIL STOP:   5/1/2023 - " None Entered    [address] [address]       [facility] [address]         Subscriber Name Subscriber Birth Date Member ID       NATALIA PENA 1975 [id number]                     Emergency Contacts        (Rel.) Home Phone Work Phone Mobile Phone    LANDEN LARA (Son) -- -- [phone]    TONI PENA (Spouse) [phone] -- --                 Discharge Summary        Steph Benavides MD at [address] [place] [address] Medicine Services  Discharge Summary    Date of Service: 2024  Patient Name: Natalia Pena  : 1975  MRN: [id number]    Date of Admission: 2024  Discharge Diagnosis:     Acute on chronic pancreatitis  Depression  Irritable bowel syndrome    Date of Discharge: 2024  Primary Care Physician: Anita Blanco MD      Hospital Course       Hospital Course:    This patient is a 49-year-old lady who presented with complaints of right upper quadrant abdominal pain.  Patient has history of chronic pancreatitis.  CT scan of the abdomen was done and this showed findings consistent with acute pancreatitis. Lipase level was checked and was found to be 1,367.  She was diagnosed with acute on chronic pancreatitis and started on aggressive IV fluids, n.p.o. and IV as needed pain medications and antiemetics for nausea and vomiting.  She slowly but gradually improved.  Clear liquids were started which she tolerated and so her diet was advanced to full liquids and then to a fat restricted diet all of which she gradually tolerated.  Lipase level has normalized at this time.  She has improved overall with resolution of abdominal pain, nausea and vomiting.  She is being discharged home today.  Of note is that she was comanaged by gastroenterology during hospital course.    DISCHARGE Follow Up Recommendations:-Follow-up PCP 1 week, follow-up with gastroenterology in 1 week.        Day of Discharge     Vital Signs:  Temp:  [97.8 °F (36.6  °C)] 97.8 °F (36.6 °C)  Heart Rate:  [70-71] 71  Resp:  [12] 12  BP: ()/(47-68) 102/67    Physical Exam:  Physical Exam  Constitutional:       Appearance: Normal appearance.   HENT:      Head: Normocephalic and atraumatic.      Nose: Nose normal.      Mouth/Throat:      Mouth: Mucous membranes are moist.   Eyes:      Extraocular Movements: Extraocular movements intact.      Pupils: Pupils are equal, round, and reactive to light.   Cardiovascular:      Rate and Rhythm: Normal rate and regular rhythm.   Pulmonary:      Effort: Pulmonary effort is normal.      Breath sounds: Normal breath sounds.   Abdominal:      General: Abdomen is flat. Bowel sounds are normal.      Palpations: Abdomen is soft.   Musculoskeletal:         General: Normal range of motion.      Cervical back: Normal range of motion and neck supple.   Skin:     General: Skin is warm and dry.   Neurological:      General: No focal deficit present.      Mental Status: She is alert and oriented to person, place, and time.   Psychiatric:         Mood and Affect: Mood normal.         Behavior: Behavior normal.         Thought Content: Thought content normal.         Judgment: Judgment normal.            Pertinent  and/or Most Recent Results     LAB RESULTS:      Lab 12/27/24  0206 12/27/24  0200 12/26/24  0240 12/25/24  0511 12/24/24  1630   WBC 6.34  --  5.83 6.64 8.87   HEMOGLOBIN 10.7*  --  11.1* 12.5 13.2   HEMATOCRIT 34.4  --  33.8* 40.0 40.0   PLATELETS 166  --  169 208 209   NEUTROS ABS 3.62  --  3.17 3.89 5.99   IMMATURE GRANS (ABS) 0.01  --  0.02 0.02 0.03   LYMPHS ABS 2.16  --  2.06 2.17 2.17   MONOS ABS 0.38  --  0.43 0.45 0.54   EOS ABS 0.16  --  0.13 0.09 0.11   MCV 93.7  --  92.6 92.6 91.3   CRP  --  4.06* 2.54* 0.72*  --          Lab 12/27/24  0200 12/26/24  0240 12/25/24  0511 12/24/24  1630   SODIUM 140 140 141 142   POTASSIUM 4.2 4.0 3.9 3.9   CHLORIDE 107 107 108* 105   CO2 26.3 25.6 24.7 25.1   ANION GAP 6.7 7.4 8.3 11.9   BUN 7 6 8  11   CREATININE 0.77 0.77 0.77 0.88   EGFR 95.9 95.9 95.9 81.7   GLUCOSE 100* 78 76 129*   CALCIUM 8.5* 8.1* 8.4* 9.4   MAGNESIUM  --   --   --  2.0         Lab 12/27/24  0200 12/26/24  0240 12/25/24  0511 12/24/24  1630   TOTAL PROTEIN 5.7* 5.6*  --  6.8   ALBUMIN 3.4* 3.4*  --  4.2   GLOBULIN 2.3 2.2  --  2.6   ALT (SGPT) 56* 78*  --  14   AST (SGOT) 30 60*  --  18   BILIRUBIN 0.3 0.7  --  0.3   ALK PHOS 113 112  --  108   LIPASE 49 140* 856* 1,367*                     Brief Urine Lab Results  (Last result in the past 365 days)        Color   Clarity   Blood   Leuk Est   Nitrite   Protein   CREAT   Urine HCG        12/24/24 1811 Yellow   Clear   Negative   Negative   Negative   Negative                 Microbiology Results (last 10 days)       ** No results found for the last 240 hours. **            CT Abdomen Pelvis With Contrast    Result Date: 12/24/2024  Impression: Impression: Findings consistent with acute pancreatitis involving the pancreatic head. No evidence of necrosis, pancreatic ductal dilation or formation of fluid collection. Electronically Signed: Ivan Link DO  12/24/2024 5:13 PM EST  Workstation ID: [id number]    CT Abdomen Pelvis Without Contrast    Result Date: 12/7/2024  Impression: Impression: 1.No acute intra-abdominal or intrapelvic process. 2.Ancillary findings as described above. Electronically Signed: Farhana Tan MD  12/7/2024 10:18 PM EST  Workstation ID: [id number]                 Labs Pending at Discharge:  Pending Results       None            Procedures Performed           Consults:   Consults       Date and Time Order Name Status Description    12/24/2024  8:05 PM Inpatient Gastroenterology Consult Completed               Discharge Details        Discharge Medications        New Medications        Instructions Start Date   HYDROcodone-acetaminophen 5-325 MG per tablet  Commonly known as: NORCO   1 tablet, Oral, Every 6 Hours PRN             Continue These Medications         Instructions Start Date   colesevelam 625 MG tablet  Commonly known as: WELCHOL   625 mg, 2 Times Daily With Meals      dicyclomine 20 MG tablet  Commonly known as: BENTYL   20 mg, Every 6 Hours PRN      famotidine 40 MG tablet  Commonly known as: PEPCID   40 mg, Nightly      nortriptyline 10 MG capsule  Commonly known as: PAMELOR   10 mg, Nightly      ondansetron ODT 4 MG disintegrating tablet  Commonly known as: ZOFRAN-ODT   4 mg, Every 8 Hours PRN      Zenpep 34367-81051 units capsule delayed-release particles  Generic drug: Pancrelipase (Lip-Prot-Amyl)   2 capsules, 3 Times Daily With Meals             Stop These Medications      acetaminophen 325 MG tablet  Commonly known as: TYLENOL     hyoscyamine 0.125 MG SL tablet  Commonly known as: LEVSIN     ondansetron 4 MG tablet  Commonly known as: ZOFRAN              Allergies   Allergen Reactions    Roxicodone [Oxycodone Hcl] Itching and Nausea Only    Toradol [Ketorolac Tromethamine] Itching and Irritability    Gabapentin Nausea And Vomiting    Naproxen Nausea And Vomiting    Pregabalin Hives    Morphine Itching         Discharge Disposition: Home  Home or Self Care    Diet: GI diet-fat restricted diet/low-fat diet        Discharge Activity:   Activity Instructions       Activity as Tolerated                CODE STATUS:  Code Status and Medical Interventions: CPR (Attempt to Resuscitate); Full Support   Ordered at: 12/26/24 0822     Code Status (Patient has no pulse and is not breathing):    CPR (Attempt to Resuscitate)     Medical Interventions (Patient has pulse or is breathing):    Full Support         Future Appointments   Date Time Provider Department Center   6/3/2025  9:00 AM Anita Blanco MD [facility]       Additional Instructions for the Follow-ups that You Need to Schedule       Discharge Follow-up with PCP   As directed       Currently Documented PCP:    Anita Blanco MD    PCP Phone Number:    [phone]     Follow Up Details: 1  week                Time spent on Discharge including face to face service: Greater than 30 minutes    Signature: Electronically signed by Steph Benavides MD, 12/27/24, 14:35 EST.  [facility] Hospitalist Team     Electronically signed by Steph Benavides MD at 12/27/24 4063

## 2024-12-30 NOTE — OUTREACH NOTE
Call Center TCM Note      Flowsheet Row Responses   [facility] patient discharged from? [facility]   Does the patient have one of the following disease processes/diagnoses(primary or secondary)? Other   TCM attempt successful? Yes   Call start time 0848   Call end time 0850   Discharge diagnosis Acute pancreatitis   Meds reviewed with patient/caregiver? Yes   Is the patient having any side effects they believe may be caused by any medication additions or changes? No   Does the patient have all medications ordered at discharge? Yes   Is the patient taking all medications as directed (includes completed medication regime)? Yes   Does the patient have an appointment with their PCP within 7-14 days of discharge? No   Nursing Interventions Patient declined scheduling/rescheduling appointment at this time   Has home health visited the patient within 72 hours of discharge? N/A   Psychosocial issues? No   Did the patient receive a copy of their discharge instructions? Yes   Nursing interventions Reviewed instructions with patient   What is the patient's perception of their health status since discharge? Improving  [some RUQ pain, nausea, denies vomiting. Pt is tolerating a soft diet]   Is the patient/caregiver able to teach back the hierarchy of who to call/visit for symptoms/problems? PCP, Specialist, Home health nurse, Urgent Care, ED, 911 Yes   TCM call completed? Yes   Call end time 0850   Would this patient benefit from a Referral to Amb Social Work? No   Is the patient interested in additional calls from an ambulatory ? No            Emely Holman RN    12/30/2024, 08:51 EST

## 2025-01-22 ENCOUNTER — APPOINTMENT (OUTPATIENT)
Dept: GENERAL RADIOLOGY | Facility: HOSPITAL | Age: 50
End: 2025-01-22
Payer: COMMERCIAL

## 2025-01-22 ENCOUNTER — APPOINTMENT (OUTPATIENT)
Dept: CT IMAGING | Facility: HOSPITAL | Age: 50
End: 2025-01-22
Payer: COMMERCIAL

## 2025-01-22 ENCOUNTER — HOSPITAL ENCOUNTER (OUTPATIENT)
Facility: HOSPITAL | Age: 50
Setting detail: OBSERVATION
Discharge: HOME | End: 2025-01-23
Attending: EMERGENCY MEDICINE | Admitting: EMERGENCY MEDICINE
Payer: COMMERCIAL

## 2025-01-22 DIAGNOSIS — K86.1 CHRONIC PANCREATITIS, UNSPECIFIED PANCREATITIS TYPE: Primary | ICD-10-CM

## 2025-01-22 DIAGNOSIS — R10.13 EPIGASTRIC PAIN: ICD-10-CM

## 2025-01-22 PROBLEM — R10.9 ABDOMINAL PAIN: Status: ACTIVE | Noted: 2025-01-22

## 2025-01-22 LAB
ALBUMIN SERPL-MCNC: 4.3 G/DL (ref 3.5–5.2)
ALBUMIN/GLOB SERPL: 1.6 G/DL
ALP SERPL-CCNC: 116 U/L (ref 39–117)
ALT SERPL W P-5'-P-CCNC: 17 U/L (ref 1–33)
ANION GAP SERPL CALCULATED.3IONS-SCNC: 10.4 MMOL/L (ref 5–15)
AST SERPL-CCNC: 18 U/L (ref 1–32)
BACTERIA UR QL AUTO: NORMAL /HPF
BASOPHILS # BLD AUTO: 0.02 10*3/MM3 (ref 0–0.2)
BASOPHILS NFR BLD AUTO: 0.3 % (ref 0–1.5)
BILIRUB SERPL-MCNC: 0.3 MG/DL (ref 0–1.2)
BILIRUB UR QL STRIP: NEGATIVE
BUN SERPL-MCNC: 11 MG/DL (ref 6–20)
BUN/CREAT SERPL: 13.8 (ref 7–25)
CALCIUM SPEC-SCNC: 9.3 MG/DL (ref 8.6–10.5)
CHLORIDE SERPL-SCNC: 104 MMOL/L (ref 98–107)
CLARITY UR: CLEAR
CO2 SERPL-SCNC: 25.6 MMOL/L (ref 22–29)
COLOR UR: YELLOW
CREAT SERPL-MCNC: 0.8 MG/DL (ref 0.57–1)
DEPRECATED RDW RBC AUTO: 41.9 FL (ref 37–54)
EGFRCR SERPLBLD CKD-EPI 2021: 91.6 ML/MIN/1.73
EOSINOPHIL # BLD AUTO: 0.1 10*3/MM3 (ref 0–0.4)
EOSINOPHIL NFR BLD AUTO: 1.4 % (ref 0.3–6.2)
ERYTHROCYTE [DISTWIDTH] IN BLOOD BY AUTOMATED COUNT: 12.6 % (ref 12.3–15.4)
GLOBULIN UR ELPH-MCNC: 2.7 GM/DL
GLUCOSE SERPL-MCNC: 95 MG/DL (ref 65–99)
GLUCOSE UR STRIP-MCNC: NEGATIVE MG/DL
HCT VFR BLD AUTO: 40.6 % (ref 34–46.6)
HGB BLD-MCNC: 13.1 G/DL (ref 12–15.9)
HGB UR QL STRIP.AUTO: ABNORMAL
HYALINE CASTS UR QL AUTO: NORMAL /LPF
IMM GRANULOCYTES # BLD AUTO: 0.02 10*3/MM3 (ref 0–0.05)
IMM GRANULOCYTES NFR BLD AUTO: 0.3 % (ref 0–0.5)
KETONES UR QL STRIP: NEGATIVE
LEUKOCYTE ESTERASE UR QL STRIP.AUTO: NEGATIVE
LIPASE SERPL-CCNC: 55 U/L (ref 13–60)
LYMPHOCYTES # BLD AUTO: 3.18 10*3/MM3 (ref 0.7–3.1)
LYMPHOCYTES NFR BLD AUTO: 43.7 % (ref 19.6–45.3)
MCH RBC QN AUTO: 29.4 PG (ref 26.6–33)
MCHC RBC AUTO-ENTMCNC: 32.3 G/DL (ref 31.5–35.7)
MCV RBC AUTO: 91.2 FL (ref 79–97)
MONOCYTES # BLD AUTO: 0.42 10*3/MM3 (ref 0.1–0.9)
MONOCYTES NFR BLD AUTO: 5.8 % (ref 5–12)
NEUTROPHILS NFR BLD AUTO: 3.53 10*3/MM3 (ref 1.7–7)
NEUTROPHILS NFR BLD AUTO: 48.5 % (ref 42.7–76)
NITRITE UR QL STRIP: NEGATIVE
NRBC BLD AUTO-RTO: 0 /100 WBC (ref 0–0.2)
NT-PROBNP SERPL-MCNC: 42.2 PG/ML (ref 0–450)
PH UR STRIP.AUTO: 6 [PH] (ref 5–8)
PLATELET # BLD AUTO: 220 10*3/MM3 (ref 140–450)
PMV BLD AUTO: 10.9 FL (ref 6–12)
POTASSIUM SERPL-SCNC: 4 MMOL/L (ref 3.5–5.2)
PROT SERPL-MCNC: 7 G/DL (ref 6–8.5)
PROT UR QL STRIP: NEGATIVE
RBC # BLD AUTO: 4.45 10*6/MM3 (ref 3.77–5.28)
RBC # UR STRIP: NORMAL /HPF
REF LAB TEST METHOD: NORMAL
SODIUM SERPL-SCNC: 140 MMOL/L (ref 136–145)
SP GR UR STRIP: 1.01 (ref 1–1.03)
SQUAMOUS #/AREA URNS HPF: NORMAL /HPF
UROBILINOGEN UR QL STRIP: ABNORMAL
WBC # UR STRIP: NORMAL /HPF
WBC NRBC COR # BLD AUTO: 7.27 10*3/MM3 (ref 3.4–10.8)

## 2025-01-22 PROCEDURE — 25010000002 ENOXAPARIN PER 10 MG: Performed by: NURSE PRACTITIONER

## 2025-01-22 PROCEDURE — 25810000003 SODIUM CHLORIDE 0.9 % SOLUTION: Performed by: NURSE PRACTITIONER

## 2025-01-22 PROCEDURE — 83690 ASSAY OF LIPASE: CPT | Performed by: NURSE PRACTITIONER

## 2025-01-22 PROCEDURE — 83880 ASSAY OF NATRIURETIC PEPTIDE: CPT | Performed by: NURSE PRACTITIONER

## 2025-01-22 PROCEDURE — 25010000002 HYDROMORPHONE 1 MG/ML SOLUTION: Performed by: EMERGENCY MEDICINE

## 2025-01-22 PROCEDURE — 96375 TX/PRO/DX INJ NEW DRUG ADDON: CPT

## 2025-01-22 PROCEDURE — 99285 EMERGENCY DEPT VISIT HI MDM: CPT

## 2025-01-22 PROCEDURE — 25510000001 IOPAMIDOL PER 1 ML: Performed by: NURSE PRACTITIONER

## 2025-01-22 PROCEDURE — G0378 HOSPITAL OBSERVATION PER HR: HCPCS

## 2025-01-22 PROCEDURE — 96372 THER/PROPH/DIAG INJ SC/IM: CPT

## 2025-01-22 PROCEDURE — 25010000002 ONDANSETRON PER 1 MG: Performed by: NURSE PRACTITIONER

## 2025-01-22 PROCEDURE — 25010000002 HYDROMORPHONE 1 MG/ML SOLUTION: Performed by: NURSE PRACTITIONER

## 2025-01-22 PROCEDURE — 96374 THER/PROPH/DIAG INJ IV PUSH: CPT

## 2025-01-22 PROCEDURE — 80053 COMPREHEN METABOLIC PANEL: CPT | Performed by: NURSE PRACTITIONER

## 2025-01-22 PROCEDURE — 85025 COMPLETE CBC W/AUTO DIFF WBC: CPT | Performed by: NURSE PRACTITIONER

## 2025-01-22 PROCEDURE — 81001 URINALYSIS AUTO W/SCOPE: CPT | Performed by: NURSE PRACTITIONER

## 2025-01-22 PROCEDURE — 74177 CT ABD & PELVIS W/CONTRAST: CPT

## 2025-01-22 PROCEDURE — 96376 TX/PRO/DX INJ SAME DRUG ADON: CPT

## 2025-01-22 RX ORDER — ONDANSETRON 2 MG/ML
4 INJECTION INTRAMUSCULAR; INTRAVENOUS EVERY 6 HOURS PRN
Status: DISCONTINUED | OUTPATIENT
Start: 2025-01-22 | End: 2025-01-23 | Stop reason: HOSPADM

## 2025-01-22 RX ORDER — ENOXAPARIN SODIUM 100 MG/ML
40 INJECTION SUBCUTANEOUS DAILY
Status: DISCONTINUED | OUTPATIENT
Start: 2025-01-22 | End: 2025-01-23 | Stop reason: HOSPADM

## 2025-01-22 RX ORDER — BISACODYL 10 MG
10 SUPPOSITORY, RECTAL RECTAL DAILY PRN
Status: DISCONTINUED | OUTPATIENT
Start: 2025-01-22 | End: 2025-01-23 | Stop reason: HOSPADM

## 2025-01-22 RX ORDER — SODIUM CHLORIDE 0.9 % (FLUSH) 0.9 %
10 SYRINGE (ML) INJECTION AS NEEDED
Status: DISCONTINUED | OUTPATIENT
Start: 2025-01-22 | End: 2025-01-23 | Stop reason: HOSPADM

## 2025-01-22 RX ORDER — ONDANSETRON 2 MG/ML
4 INJECTION INTRAMUSCULAR; INTRAVENOUS ONCE
Status: COMPLETED | OUTPATIENT
Start: 2025-01-22 | End: 2025-01-22

## 2025-01-22 RX ORDER — HYDROMORPHONE HYDROCHLORIDE 1 MG/ML
0.5 INJECTION, SOLUTION INTRAMUSCULAR; INTRAVENOUS; SUBCUTANEOUS EVERY 4 HOURS PRN
Status: DISPENSED | OUTPATIENT
Start: 2025-01-22 | End: 2025-01-23

## 2025-01-22 RX ORDER — NORTRIPTYLINE HYDROCHLORIDE 50 MG/1
50 CAPSULE ORAL NIGHTLY
COMMUNITY

## 2025-01-22 RX ORDER — SODIUM CHLORIDE 9 MG/ML
40 INJECTION, SOLUTION INTRAVENOUS AS NEEDED
Status: DISCONTINUED | OUTPATIENT
Start: 2025-01-22 | End: 2025-01-23 | Stop reason: HOSPADM

## 2025-01-22 RX ORDER — FAMOTIDINE 10 MG/ML
20 INJECTION, SOLUTION INTRAVENOUS ONCE
Status: COMPLETED | OUTPATIENT
Start: 2025-01-22 | End: 2025-01-22

## 2025-01-22 RX ORDER — SODIUM CHLORIDE 0.9 % (FLUSH) 0.9 %
10 SYRINGE (ML) INJECTION AS NEEDED
Status: DISCONTINUED | OUTPATIENT
Start: 2025-01-22 | End: 2025-01-22

## 2025-01-22 RX ORDER — BISACODYL 5 MG/1
5 TABLET, DELAYED RELEASE ORAL DAILY PRN
Status: DISCONTINUED | OUTPATIENT
Start: 2025-01-22 | End: 2025-01-23 | Stop reason: HOSPADM

## 2025-01-22 RX ORDER — POLYETHYLENE GLYCOL 3350 17 G/17G
17 POWDER, FOR SOLUTION ORAL DAILY PRN
Status: DISCONTINUED | OUTPATIENT
Start: 2025-01-22 | End: 2025-01-23 | Stop reason: HOSPADM

## 2025-01-22 RX ORDER — SODIUM CHLORIDE 0.9 % (FLUSH) 0.9 %
10 SYRINGE (ML) INJECTION EVERY 12 HOURS SCHEDULED
Status: DISCONTINUED | OUTPATIENT
Start: 2025-01-22 | End: 2025-01-23 | Stop reason: HOSPADM

## 2025-01-22 RX ORDER — AMOXICILLIN 250 MG
2 CAPSULE ORAL 2 TIMES DAILY PRN
Status: DISCONTINUED | OUTPATIENT
Start: 2025-01-22 | End: 2025-01-23 | Stop reason: HOSPADM

## 2025-01-22 RX ORDER — IOPAMIDOL 755 MG/ML
100 INJECTION, SOLUTION INTRAVASCULAR
Status: COMPLETED | OUTPATIENT
Start: 2025-01-22 | End: 2025-01-22

## 2025-01-22 RX ORDER — HYDROCODONE BITARTRATE AND ACETAMINOPHEN 5; 325 MG/1; MG/1
1 TABLET ORAL EVERY 6 HOURS PRN
Status: DISPENSED | OUTPATIENT
Start: 2025-01-22 | End: 2025-01-23

## 2025-01-22 RX ADMIN — HYDROMORPHONE HYDROCHLORIDE 0.5 MG: 1 INJECTION, SOLUTION INTRAMUSCULAR; INTRAVENOUS; SUBCUTANEOUS at 19:14

## 2025-01-22 RX ADMIN — IOPAMIDOL 100 ML: 755 INJECTION, SOLUTION INTRAVENOUS at 19:56

## 2025-01-22 RX ADMIN — ONDANSETRON 4 MG: 2 INJECTION INTRAMUSCULAR; INTRAVENOUS at 19:13

## 2025-01-22 RX ADMIN — FAMOTIDINE 20 MG: 10 INJECTION INTRAVENOUS at 21:16

## 2025-01-22 RX ADMIN — SODIUM CHLORIDE 1000 ML: 9 INJECTION, SOLUTION INTRAVENOUS at 19:14

## 2025-01-22 RX ADMIN — ENOXAPARIN SODIUM 40 MG: 100 INJECTION SUBCUTANEOUS at 21:16

## 2025-01-22 RX ADMIN — HYDROMORPHONE HYDROCHLORIDE 0.5 MG: 1 INJECTION, SOLUTION INTRAMUSCULAR; INTRAVENOUS; SUBCUTANEOUS at 21:16

## 2025-01-22 NOTE — LETTER
January 23, 2025     Patient: Dianna Claros   YOB: 1975   Date of Visit: 1/22/2025       To Whom It May Concern:    It is my medical opinion that Dianna Claros may return to full duty immediately with no restrictions 1/27/25.           Sincerely,        Trinity LEONARD RN    CC: No Recipients

## 2025-01-22 NOTE — LETTER
January 23, 2025     Patient: Dianna Claros   YOB: 1975   Date of Visit: 1/22/2025       To Whom It May Concern:    It is my medical opinion that Dianna Claros may return to work on 1/27/25 .           Sincerely,        Trinity LEONARD RN    CC: No Recipients

## 2025-01-23 ENCOUNTER — READMISSION MANAGEMENT (OUTPATIENT)
Dept: CALL CENTER | Facility: HOSPITAL | Age: 50
End: 2025-01-23
Payer: COMMERCIAL

## 2025-01-23 VITALS
TEMPERATURE: 97.4 F | BODY MASS INDEX: 34.06 KG/M2 | HEART RATE: 51 BPM | HEIGHT: 64 IN | SYSTOLIC BLOOD PRESSURE: 101 MMHG | OXYGEN SATURATION: 98 % | WEIGHT: 199.52 LBS | DIASTOLIC BLOOD PRESSURE: 51 MMHG | RESPIRATION RATE: 17 BRPM

## 2025-01-23 LAB
ANION GAP SERPL CALCULATED.3IONS-SCNC: 9.1 MMOL/L (ref 5–15)
BASOPHILS # BLD AUTO: 0.03 10*3/MM3 (ref 0–0.2)
BASOPHILS NFR BLD AUTO: 0.5 % (ref 0–1.5)
BUN SERPL-MCNC: 9 MG/DL (ref 6–20)
BUN/CREAT SERPL: 11.5 (ref 7–25)
CALCIUM SPEC-SCNC: 8.9 MG/DL (ref 8.6–10.5)
CHLORIDE SERPL-SCNC: 107 MMOL/L (ref 98–107)
CO2 SERPL-SCNC: 23.9 MMOL/L (ref 22–29)
CREAT SERPL-MCNC: 0.78 MG/DL (ref 0.57–1)
DEPRECATED RDW RBC AUTO: 43.4 FL (ref 37–54)
EGFRCR SERPLBLD CKD-EPI 2021: 94.4 ML/MIN/1.73
EOSINOPHIL # BLD AUTO: 0.09 10*3/MM3 (ref 0–0.4)
EOSINOPHIL NFR BLD AUTO: 1.4 % (ref 0.3–6.2)
ERYTHROCYTE [DISTWIDTH] IN BLOOD BY AUTOMATED COUNT: 12.8 % (ref 12.3–15.4)
GLUCOSE SERPL-MCNC: 78 MG/DL (ref 65–99)
HCT VFR BLD AUTO: 40 % (ref 34–46.6)
HGB BLD-MCNC: 12.5 G/DL (ref 12–15.9)
IMM GRANULOCYTES # BLD AUTO: 0.01 10*3/MM3 (ref 0–0.05)
IMM GRANULOCYTES NFR BLD AUTO: 0.2 % (ref 0–0.5)
LIPASE SERPL-CCNC: 81 U/L (ref 13–60)
LYMPHOCYTES # BLD AUTO: 2.82 10*3/MM3 (ref 0.7–3.1)
LYMPHOCYTES NFR BLD AUTO: 45.1 % (ref 19.6–45.3)
MCH RBC QN AUTO: 28.7 PG (ref 26.6–33)
MCHC RBC AUTO-ENTMCNC: 31.3 G/DL (ref 31.5–35.7)
MCV RBC AUTO: 92 FL (ref 79–97)
MONOCYTES # BLD AUTO: 0.39 10*3/MM3 (ref 0.1–0.9)
MONOCYTES NFR BLD AUTO: 6.2 % (ref 5–12)
NEUTROPHILS NFR BLD AUTO: 2.91 10*3/MM3 (ref 1.7–7)
NEUTROPHILS NFR BLD AUTO: 46.6 % (ref 42.7–76)
NRBC BLD AUTO-RTO: 0 /100 WBC (ref 0–0.2)
PLATELET # BLD AUTO: 197 10*3/MM3 (ref 140–450)
PMV BLD AUTO: 10.9 FL (ref 6–12)
POTASSIUM SERPL-SCNC: 4.1 MMOL/L (ref 3.5–5.2)
RBC # BLD AUTO: 4.35 10*6/MM3 (ref 3.77–5.28)
SODIUM SERPL-SCNC: 140 MMOL/L (ref 136–145)
WBC NRBC COR # BLD AUTO: 6.25 10*3/MM3 (ref 3.4–10.8)

## 2025-01-23 PROCEDURE — 96376 TX/PRO/DX INJ SAME DRUG ADON: CPT

## 2025-01-23 PROCEDURE — 83690 ASSAY OF LIPASE: CPT | Performed by: PHYSICIAN ASSISTANT

## 2025-01-23 PROCEDURE — 85025 COMPLETE CBC W/AUTO DIFF WBC: CPT | Performed by: NURSE PRACTITIONER

## 2025-01-23 PROCEDURE — 25010000002 ONDANSETRON PER 1 MG: Performed by: NURSE PRACTITIONER

## 2025-01-23 PROCEDURE — 25010000002 HYDROMORPHONE PER 4 MG: Performed by: NURSE PRACTITIONER

## 2025-01-23 PROCEDURE — 80048 BASIC METABOLIC PNL TOTAL CA: CPT | Performed by: NURSE PRACTITIONER

## 2025-01-23 PROCEDURE — 25010000002 HYDROMORPHONE PER 4 MG: Performed by: PHYSICIAN ASSISTANT

## 2025-01-23 PROCEDURE — G0378 HOSPITAL OBSERVATION PER HR: HCPCS

## 2025-01-23 RX ORDER — DICYCLOMINE HYDROCHLORIDE 10 MG/1
20 CAPSULE ORAL 4 TIMES DAILY
Status: DISCONTINUED | OUTPATIENT
Start: 2025-01-23 | End: 2025-01-23 | Stop reason: HOSPADM

## 2025-01-23 RX ORDER — FAMOTIDINE 20 MG/1
40 TABLET, FILM COATED ORAL NIGHTLY
Status: DISCONTINUED | OUTPATIENT
Start: 2025-01-23 | End: 2025-01-23 | Stop reason: HOSPADM

## 2025-01-23 RX ORDER — OXYCODONE HYDROCHLORIDE 5 MG/1
5 TABLET ORAL EVERY 4 HOURS PRN
Status: DISCONTINUED | OUTPATIENT
Start: 2025-01-23 | End: 2025-01-23 | Stop reason: HOSPADM

## 2025-01-23 RX ORDER — BUTALBITAL, ACETAMINOPHEN AND CAFFEINE 50; 325; 40 MG/1; MG/1; MG/1
1 TABLET ORAL EVERY 4 HOURS PRN
Status: DISCONTINUED | OUTPATIENT
Start: 2025-01-23 | End: 2025-01-23 | Stop reason: HOSPADM

## 2025-01-23 RX ORDER — NORTRIPTYLINE HYDROCHLORIDE 25 MG/1
50 CAPSULE ORAL NIGHTLY
Status: DISCONTINUED | OUTPATIENT
Start: 2025-01-23 | End: 2025-01-23 | Stop reason: HOSPADM

## 2025-01-23 RX ORDER — CHOLESTYRAMINE LIGHT 4 G/5.7G
1 POWDER, FOR SUSPENSION ORAL DAILY
Status: DISCONTINUED | OUTPATIENT
Start: 2025-01-23 | End: 2025-01-23 | Stop reason: HOSPADM

## 2025-01-23 RX ORDER — HYDROCODONE BITARTRATE AND ACETAMINOPHEN 10; 325 MG/1; MG/1
1 TABLET ORAL EVERY 6 HOURS PRN
Status: DISCONTINUED | OUTPATIENT
Start: 2025-01-23 | End: 2025-01-23 | Stop reason: HOSPADM

## 2025-01-23 RX ORDER — OXYCODONE HYDROCHLORIDE 5 MG/1
5 TABLET ORAL EVERY 6 HOURS PRN
Qty: 12 TABLET | Refills: 0 | Status: SHIPPED | OUTPATIENT
Start: 2025-01-23

## 2025-01-23 RX ORDER — HYDROMORPHONE HYDROCHLORIDE 1 MG/ML
0.5 INJECTION, SOLUTION INTRAMUSCULAR; INTRAVENOUS; SUBCUTANEOUS ONCE
Status: COMPLETED | OUTPATIENT
Start: 2025-01-23 | End: 2025-01-23

## 2025-01-23 RX ADMIN — DICYCLOMINE HYDROCHLORIDE 20 MG: 10 CAPSULE ORAL at 11:05

## 2025-01-23 RX ADMIN — HYDROMORPHONE HYDROCHLORIDE 0.5 MG: 1 INJECTION, SOLUTION INTRAMUSCULAR; INTRAVENOUS; SUBCUTANEOUS at 11:05

## 2025-01-23 RX ADMIN — HYDROCODONE BITARTRATE AND ACETAMINOPHEN 1 TABLET: 5; 325 TABLET ORAL at 00:19

## 2025-01-23 RX ADMIN — Medication 10 ML: at 05:20

## 2025-01-23 RX ADMIN — PANCRELIPASE 24000 UNITS OF LIPASE: 60000; 12000; 38000 CAPSULE, DELAYED RELEASE PELLETS ORAL at 08:19

## 2025-01-23 RX ADMIN — OXYCODONE 5 MG: 5 TABLET ORAL at 14:59

## 2025-01-23 RX ADMIN — DICYCLOMINE HYDROCHLORIDE 20 MG: 10 CAPSULE ORAL at 08:19

## 2025-01-23 RX ADMIN — BUTALBITAL, ACETAMINOPHEN, AND CAFFEINE 1 TABLET: 50; 325; 40 TABLET ORAL at 08:19

## 2025-01-23 RX ADMIN — HYDROMORPHONE HYDROCHLORIDE 0.5 MG: 1 INJECTION, SOLUTION INTRAMUSCULAR; INTRAVENOUS; SUBCUTANEOUS at 01:32

## 2025-01-23 RX ADMIN — Medication 10 ML: at 04:27

## 2025-01-23 RX ADMIN — Medication 10 ML: at 08:20

## 2025-01-23 RX ADMIN — Medication 10 ML: at 01:32

## 2025-01-23 RX ADMIN — HYDROMORPHONE HYDROCHLORIDE 0.5 MG: 1 INJECTION, SOLUTION INTRAMUSCULAR; INTRAVENOUS; SUBCUTANEOUS at 05:20

## 2025-01-23 RX ADMIN — ONDANSETRON 4 MG: 2 INJECTION INTRAMUSCULAR; INTRAVENOUS at 04:27

## 2025-01-23 RX ADMIN — HYDROCODONE BITARTRATE AND ACETAMINOPHEN 1 TABLET: 10; 325 TABLET ORAL at 09:30

## 2025-01-23 NOTE — PLAN OF CARE
Goal Outcome Evaluation:      AOX4, c/o RUQ pain radiating to back 7-8/10 pain medications administered. No n/v/d. Clear liquid diet.

## 2025-01-23 NOTE — DISCHARGE SUMMARY
"[facility]    Anita Blanco MD    CHIEF COMPLAINT:     Abdominal pain with nausea and vomiting    HISTORY OF PRESENT ILLNESS:    Obtained from ED provide HPI on 2025:  Patient is a 49-year-old female who presents to the emergency department with abdominal pain stating that she has chronic pancreatitis and this feels like other flareups.  Patient states that because she experienced the same pain today as an other acute pancreatitis attacks she has only been on a liquid diet for the past 12 hours but it has not helped; states the pain is only gotten worse in that period.  Patient denies any change of bowel or bladder habits, denies chest pain, shortness of breath, headache, or any other physical complaints at this time.    25:  Patient confirms the HPI noted above including approximately 3-day history of of some epigastric pain radiating towards her back which has been worsening described as \"severe\" which is significantly worse with attempts at p.o. intake of any solids.  She has had some episodes of nausea as well as nonbloody vomiting but denies any fever, dyspnea, cough or changes in her bowel and bladder habits.  Patient continues to smoke approximately 1 pack of cigarettes per day and does not drink alcohol.  She notes that symptoms are consistent with her previous episodes of pancreatitis which began shortly after cholecystectomy            Past Medical History:   Diagnosis Date    Endometriosis     Fibromyalgia     Hyperlipidemia     Hypertension      Past Surgical History:   Procedure Laterality Date    ABDOMINAL SURGERY       SECTION      CHOLECYSTECTOMY WITH INTRAOPERATIVE CHOLANGIOGRAM N/A 2022    Procedure: CHOLECYSTECTOMY LAPAROSCOPIC INTRAOPERATIVE CHOLANGIOGRAM;  Surgeon: Robert Dale MD;  Location: [facility];  Service: General;  Laterality: N/A;    COLONOSCOPY N/A 3/7/2023    Procedure: COLONOSCOPY with ileum and large intestine random " biopsies R/O microscopic colitis;  Surgeon: NAVIN Schneider MD;  Location: [facility] ENDOSCOPY;  Service: Gastroenterology;  Laterality: N/A;  hemorrhoids    ENDOSCOPY N/A 5/30/2022    Procedure: ESOPHAGOGASTRODUODENOSCOPY WITH BIOPSY;  Surgeon: NAVIN Schneider MD;  Location: [facility] ENDOSCOPY;  Service: Gastroenterology;  Laterality: N/A;    HYSTERECTOMY      UPPER ENDOSCOPIC ULTRASOUND W/ FNA N/A 12/4/2023    Procedure: ESOPHAGOGASTRODUODENOSCOPY WITH ENDOSCOPIC ULTRASOUND;  Surgeon: Malik King MD;  Location: [facility] ENDOSCOPY;  Service: Gastroenterology;  Laterality: N/A;  POST: CHRONIC PANCREATITIS     Family History   Problem Relation Age of Onset    Cancer Mother     Heart disease Father     Pancreatic cancer Sister      Social History     Tobacco Use    Smoking status: Every Day     Current packs/day: 1.00     Average packs/day: 1 pack/day for 34.6 years (34.6 ttl pk-yrs)     Types: Cigarettes     Start date: 6/5/1990    Smokeless tobacco: Never   Vaping Use    Vaping status: Never Used   Substance Use Topics    Alcohol use: Never    Drug use: Never     Medications Prior to Admission   Medication Sig Dispense Refill Last Dose/Taking    nortriptyline (PAMELOR) 50 MG capsule Take 1 capsule by mouth Every Night.   Taking    colesevelam (WELCHOL) 625 MG tablet Take 1 tablet by mouth Daily.       dicyclomine (BENTYL) 20 MG tablet Take 1 tablet by mouth Every 6 (Six) Hours As Needed. for pain       famotidine (PEPCID) 40 MG tablet Take 1 tablet by mouth Every Night.       hyoscyamine (LEVSIN) 0.125 MG SL tablet Place 1 tablet under the tongue Every 6 (Six) Hours As Needed for Cramping.       ondansetron ODT (ZOFRAN-ODT) 4 MG disintegrating tablet Place 1 tablet on the tongue Every 8 (Eight) Hours As Needed for Nausea or Vomiting.       Pancrelipase, Lip-Prot-Amyl, (Zenpep) 71642-22318 units capsule delayed-release particles Take 2 capsules by mouth 3 (Three) Times a Day With Meals.        Allergies:  Roxicodone  [oxycodone hcl], Toradol [ketorolac tromethamine], Gabapentin, Naproxen, Pregabalin, and Morphine    Immunization History   Administered Date(s) Administered    COVID-19 (MODERNA) 1st,2nd,3rd Dose Monovalent 08/17/2021, 09/14/2021    COVID-19 (MODERNA) Monovalent Original Booster 08/11/2022    Flu Vaccine Quad PF 6-35MO 09/25/2017, 01/03/2019    Flu Vaccine Quad PF >36MO 09/25/2017, 01/03/2019    Fluzone  >6mos 10/15/2015    H1N1 Inj Preservative Free 12/16/2009    Hepatitis B 02/12/2008, 06/04/2009, 07/17/2009    Influenza TIV (IM) 10/19/2010    PPD Test 01/25/2017, 06/07/2017, 06/13/2018, 06/05/2019, 08/31/2022    Pneumococcal Conjugate 20-Valent (PCV20) 08/16/2022    Tdap 02/19/2019           REVIEW OF SYSTEMS:    Review of Systems   Constitutional: Negative.   HENT: Negative.     Eyes: Negative.    Cardiovascular: Negative.    Respiratory: Negative.     Skin: Negative.    Musculoskeletal: Negative.    Gastrointestinal:  Positive for abdominal pain, nausea and vomiting. Negative for hematemesis.   Genitourinary: Negative.    Neurological: Negative.    Psychiatric/Behavioral: Negative.         Vital Signs  Temp:  [97.2 °F (36.2 °C)-98.4 °F (36.9 °C)] 97.4 °F (36.3 °C)  Heart Rate:  [51-87] 51  Resp:  [12-20] 17  BP: (101-141)/(51-88) 101/51          Physical Exam:  Physical Exam  Vitals reviewed.   Constitutional:       General: She is not in acute distress.     Appearance: Normal appearance. She is obese. She is not ill-appearing, toxic-appearing or diaphoretic.   HENT:      Head: Normocephalic.      Right Ear: External ear normal.      Left Ear: External ear normal.      Nose: Nose normal.      Mouth/Throat:      Mouth: Mucous membranes are moist.   Eyes:      Extraocular Movements: Extraocular movements intact.   Cardiovascular:      Rate and Rhythm: Normal rate and regular rhythm.      Pulses: Normal pulses.   Pulmonary:      Effort: Pulmonary effort is normal.      Breath sounds: Normal breath sounds.    Abdominal:      General: Bowel sounds are normal.      Palpations: Abdomen is soft.      Tenderness: There is abdominal tenderness.      Comments: Mild epigastric tenderness reported   Musculoskeletal:      Cervical back: Normal range of motion.      Right lower leg: No edema.      Left lower leg: No edema.   Skin:     General: Skin is warm and dry.      Capillary Refill: Capillary refill takes less than 2 seconds.   Neurological:      General: No focal deficit present.      Mental Status: She is alert.   Psychiatric:         Mood and Affect: Mood normal.         Behavior: Behavior normal.         Thought Content: Thought content normal.         Judgment: Judgment normal.         Emotional Behavior:   Normal   Debilities:  None  Results Review:    I reviewed the patient's new clinical results.  Lab Results (most recent)       Procedure Component Value Units Date/Time    Lipase [[id number]]  (Abnormal) Collected: 01/23/25 0423    Specimen: Blood from Arm, Left Updated: 01/23/25 0711     Lipase 81 U/L     Basic Metabolic Panel [[id number]]  (Normal) Collected: 01/23/25 0423    Specimen: Blood from Arm, Left Updated: 01/23/25 0516     Glucose 78 mg/dL      BUN 9 mg/dL      Creatinine 0.78 mg/dL      Sodium 140 mmol/L      Potassium 4.1 mmol/L      Chloride 107 mmol/L      CO2 23.9 mmol/L      Calcium 8.9 mg/dL      BUN/Creatinine Ratio 11.5     Anion Gap 9.1 mmol/L      eGFR 94.4 mL/min/1.73     Narrative:      GFR Categories in Chronic Kidney Disease (CKD)      GFR Category          GFR (mL/min/1.73)    Interpretation  G1                     90 or greater         Normal or high (1)  G2                      60-89                Mild decrease (1)  G3a                   45-59                Mild to moderate decrease  G3b                   30-44                Moderate to severe decrease  G4                    15-29                Severe decrease  G5                    14 or less           Kidney failure          (1)In  the absence of evidence of kidney disease, neither GFR category G1 or G2 fulfill the criteria for CKD.    eGFR calculation 2021 CKD-EPI creatinine equation, which does not include race as a factor    CBC Auto Differential [[id number]]  (Abnormal) Collected: 01/23/25 0423    Specimen: Blood from Arm, Left Updated: 01/23/25 0445     WBC 6.25 10*3/mm3      RBC 4.35 10*6/mm3      Hemoglobin 12.5 g/dL      Hematocrit 40.0 %      MCV 92.0 fL      MCH 28.7 pg      MCHC 31.3 g/dL      RDW 12.8 %      RDW-SD 43.4 fl      MPV 10.9 fL      Platelets 197 10*3/mm3      Neutrophil % 46.6 %      Lymphocyte % 45.1 %      Monocyte % 6.2 %      Eosinophil % 1.4 %      Basophil % 0.5 %      Immature Grans % 0.2 %      Neutrophils, Absolute 2.91 10*3/mm3      Lymphocytes, Absolute 2.82 10*3/mm3      Monocytes, Absolute 0.39 10*3/mm3      Eosinophils, Absolute 0.09 10*3/mm3      Basophils, Absolute 0.03 10*3/mm3      Immature Grans, Absolute 0.01 10*3/mm3      nRBC 0.0 /100 WBC     BNP [[id number]]  (Normal) Collected: 01/22/25 1843    Specimen: Blood from Arm, Left Updated: 01/22/25 1943     proBNP 42.2 pg/mL     Narrative:      This assay is used as an aid in the diagnosis of individuals suspected of having heart failure. It can be used as an aid in the diagnosis of acute decompensated heart failure (ADHF) in patients presenting with signs and symptoms of ADHF to the emergency department (ED). In addition, NT-proBNP of <300 pg/mL indicates ADHF is not likely.    Age Range Result Interpretation  NT-proBNP Concentration (pg/mL:      <50             Positive            >450                   Gray                 300-450                    Negative             <300    50-75           Positive            >900                  Gray                300-900                  Negative            <300      >75             Positive            >1800                  Gray                300-1800                  Negative            <300     Comprehensive Metabolic Panel [[id number]] Collected: 01/22/25 1843    Specimen: Blood from Arm, Left Updated: 01/22/25 1922     Glucose 95 mg/dL      BUN 11 mg/dL      Creatinine 0.80 mg/dL      Sodium 140 mmol/L      Potassium 4.0 mmol/L      Chloride 104 mmol/L      CO2 25.6 mmol/L      Calcium 9.3 mg/dL      Total Protein 7.0 g/dL      Albumin 4.3 g/dL      ALT (SGPT) 17 U/L      AST (SGOT) 18 U/L      Alkaline Phosphatase 116 U/L      Total Bilirubin 0.3 mg/dL      Globulin 2.7 gm/dL      A/G Ratio 1.6 g/dL      BUN/Creatinine Ratio 13.8     Anion Gap 10.4 mmol/L      eGFR 91.6 mL/min/1.73     Narrative:      GFR Categories in Chronic Kidney Disease (CKD)      GFR Category          GFR (mL/min/1.73)    Interpretation  G1                     90 or greater         Normal or high (1)  G2                      60-89                Mild decrease (1)  G3a                   45-59                Mild to moderate decrease  G3b                   30-44                Moderate to severe decrease  G4                    15-29                Severe decrease  G5                    14 or less           Kidney failure          (1)In the absence of evidence of kidney disease, neither GFR category G1 or G2 fulfill the criteria for CKD.    eGFR calculation 2021 CKD-EPI creatinine equation, which does not include race as a factor    Lipase [[id number]]  (Normal) Collected: 01/22/25 1843    Specimen: Blood from Arm, Left Updated: 01/22/25 1922     Lipase 55 U/L     CBC & Differential [[id number]]  (Abnormal) Collected: 01/22/25 1843    Specimen: Blood from Arm, Left Updated: 01/22/25 1903    Narrative:      The following orders were created for panel order CBC & Differential.  Procedure                               Abnormality         Status                     ---------                               -----------         ------                     CBC Auto Differential[[id number]]        Abnormal            Final result                  Please view results for these tests on the individual orders.    CBC Auto Differential [[id number]]  (Abnormal) Collected: 01/22/25 1843    Specimen: Blood from Arm, Left Updated: 01/22/25 1903     WBC 7.27 10*3/mm3      RBC 4.45 10*6/mm3      Hemoglobin 13.1 g/dL      Hematocrit 40.6 %      MCV 91.2 fL      MCH 29.4 pg      MCHC 32.3 g/dL      RDW 12.6 %      RDW-SD 41.9 fl      MPV 10.9 fL      Platelets 220 10*3/mm3      Neutrophil % 48.5 %      Lymphocyte % 43.7 %      Monocyte % 5.8 %      Eosinophil % 1.4 %      Basophil % 0.3 %      Immature Grans % 0.3 %      Neutrophils, Absolute 3.53 10*3/mm3      Lymphocytes, Absolute 3.18 10*3/mm3      Monocytes, Absolute 0.42 10*3/mm3      Eosinophils, Absolute 0.10 10*3/mm3      Basophils, Absolute 0.02 10*3/mm3      Immature Grans, Absolute 0.02 10*3/mm3      nRBC 0.0 /100 WBC     Urinalysis With Microscopic If Indicated (No Culture) - Urine, Clean Catch [[id number]]  (Abnormal) Collected: 01/22/25 1834    Specimen: Urine, Clean Catch Updated: 01/22/25 1842     Color, UA Yellow     Appearance, UA Clear     pH, UA 6.0     Specific Gravity, UA 1.011     Glucose, UA Negative     Ketones, UA Negative     Bilirubin, UA Negative     Blood, UA Trace     Protein, UA Negative     Leuk Esterase, UA Negative     Nitrite, UA Negative     Urobilinogen, UA 0.2 E.U./dL    Urinalysis, Microscopic Only - Urine, Clean Catch [[id number]] Collected: 01/22/25 1834    Specimen: Urine, Clean Catch Updated: 01/22/25 1842     RBC, UA 0-2 /HPF      WBC, UA 0-2 /HPF      Bacteria, UA None Seen /HPF      Squamous Epithelial Cells, UA 0-2 /HPF      Hyaline Casts, UA None Seen /LPF      Methodology Automated Microscopy            Imaging Results (Most Recent)       Procedure Component Value Units Date/Time    CT Abdomen Pelvis With Contrast [[id number]] Collected: 01/22/25 2003     Updated: 01/22/25 2009    Narrative:      CT ABDOMEN PELVIS W CONTRAST    Date of Exam: 1/22/2025 7:44 PM  EST    Indication: abd pain hx pancreatitis.    Comparison: 12/24/2024    Technique: Axial CT images were obtained of the abdomen and pelvis following the uneventful intravenous administration of iodinated contrast. Sagittal and coronal reconstructions were performed.  Automated exposure control and iterative reconstruction   methods were used.    FINDINGS:    Lung bases: No masses. No consolidation.    Liver:No masses. No intrahepatic biliary ductal dilatation.    Spleen:No masses. No perisplenic hematoma.    Pancreas:No pancreatic masses. No evidence of pancreatitis.    Gallbladder and common bile duct: Previous cholecystectomy    Adrenal glands:No adrenal masses    Kidneys and ureters:No kidney stones. No renal masses.No calculi present within the ureters. Normal caliber ureters.    Urinary bladder:No urinary bladder wall thickening. No bladder masses.    Small bowel:Normal caliber small bowel.    Large bowel:No diverticulosis or diverticulitis. No large bowel masses are appreciated    Appendix: Normal    GENITOURINARY: Prior hysterectomy    Ascites or pneumoperitoneum:None.    Adenopathy:None present    Osseous structures: The proximal femurs are intact. The pubic bones are intact. The sacrum and sacroiliac joints are normal. The spinous and transverse processes are intact. No rib fractures.    Other findings: None      Impression:      1.No acute abdominal or pelvic abnormality. No evidence of pancreatitis.  2.Previous cholecystectomy and hysterectomy.        Electronically Signed: Keenan Davis MD    1/22/2025 8:06 PM EST    Workstation ID: [id number]          reviewed    ECG/EMG Results (most recent)       None          not reviewed            Microbiology Results (last 10 days)       ** No results found for the last 240 hours. **            Assessment & Plan     Abdominal pain       Acute on chronic pancreatitis  Lab Results   Component Value Date    WBC 6.25 01/23/2025    AST 18 01/22/2025    ALT 17 01/22/2025     ALKPHOS 116 01/22/2025    BILITOT 0.3 01/22/2025    LIPASE 81 (H) 01/23/2025   -BNP: 42.2  -UA showed trace blood but was otherwise unremarkable  -CT of abdomen and pelvis: No acute findings or evidence of pancreatitis with previous cholecystectomy and hysterectomy noted  -In the ED patient was given Dilaudid, famotidine, Zofran and 1 L fluid bolus  -1 additional dose of Dilaudid given with patient noting she is tolerating p.o. liquids without significant difficulty and was switched to oxycodone which she had listed as an allergy but reports she has tolerated in the past.  This was tolerated without difficulty as well  -Advance diet to full liquid  -Continue Conrado Summers  -Monitor CMP while admitted      I discussed the patients findings and my recommendations with patient and nursing staff.     Discharge Diagnosis:      Abdominal pain      Hospital Course  Patient is a 49 y.o. female presented with abdominal pain with nausea and vomiting and a history of chronic pancreatitis with an HPI noted above.  Lipase was initially found to be 55 and trended up to 81 on the morning following admission with CMP and CBC being otherwise generally unremarkable.  proBNP was within normal limits at 42.2 and UA showed trace blood.  CT of abdomen and pelvis was obtained which showed no acute findings with previous cholecystectomy and hysterectomy noted.  She was given Dilaudid, famotidine, Zofran and 1 L fluid bolus in the ED.  Clear liquid diet was initiated which patient tolerated well and she was given 1 additional dose of Dilaudid and is subsequently started on p.o. oxycodone.  She continued to manage a liquid diet and did request advancement to full liquids which was ordered and she tolerated without significant difficulty and moderate pain which continued to be managed with oxycodone.  No further nausea or vomiting is noted.  At this time she is felt to be in good condition for discharge with close follow-up with her PCP on  an outpatient basis.  Her full testing/results and plan were discussed with patient along with concerning/alarm symptoms for which to call 911/return to the ED.  A short course of oxycodone will be prescribed at discharge and she is instructed to ensure adequate hydration.  All questions were answered and she verbalizes her understanding and agreement.    Past Medical History:     Past Medical History:   Diagnosis Date    Endometriosis     Fibromyalgia     Hyperlipidemia     Hypertension        Past Surgical History:     Past Surgical History:   Procedure Laterality Date    ABDOMINAL SURGERY       SECTION      CHOLECYSTECTOMY WITH INTRAOPERATIVE CHOLANGIOGRAM N/A 2022    Procedure: CHOLECYSTECTOMY LAPAROSCOPIC INTRAOPERATIVE CHOLANGIOGRAM;  Surgeon: Robert Dale MD;  Location: [facility] MAIN OR;  Service: General;  Laterality: N/A;    COLONOSCOPY N/A 3/7/2023    Procedure: COLONOSCOPY with ileum and large intestine random biopsies R/O microscopic colitis;  Surgeon: NAVIN Schneider MD;  Location: [facility] ENDOSCOPY;  Service: Gastroenterology;  Laterality: N/A;  hemorrhoids    ENDOSCOPY N/A 2022    Procedure: ESOPHAGOGASTRODUODENOSCOPY WITH BIOPSY;  Surgeon: NAVIN Schneider MD;  Location: [facility] ENDOSCOPY;  Service: Gastroenterology;  Laterality: N/A;    HYSTERECTOMY      UPPER ENDOSCOPIC ULTRASOUND W/ FNA N/A 2023    Procedure: ESOPHAGOGASTRODUODENOSCOPY WITH ENDOSCOPIC ULTRASOUND;  Surgeon: Malik King MD;  Location: [facility] ENDOSCOPY;  Service: Gastroenterology;  Laterality: N/A;  POST: CHRONIC PANCREATITIS       Social History:   Social History     Socioeconomic History    Marital status:    Tobacco Use    Smoking status: Every Day     Current packs/day: 1.00     Average packs/day: 1 pack/day for 34.6 years (34.6 ttl pk-yrs)     Types: Cigarettes     Start date: 1990    Smokeless tobacco: Never   Vaping Use    Vaping status: Never Used   Substance and Sexual Activity     Alcohol use: Never    Drug use: Never    Sexual activity: Defer       Procedures Performed         Consults:   Consults       Date and Time Order Name Status Description    12/24/2024  8:05 PM Inpatient Gastroenterology Consult Completed             Condition on Discharge:     Stable    Discharge Disposition  Home or Self Care    Discharge Medications     Discharge Medications        New Medications        Instructions Start Date   oxyCODONE 5 MG immediate release tablet  Commonly known as: ROXICODONE   5 mg, Oral, Every 6 Hours PRN             Continue These Medications        Instructions Start Date   colesevelam 625 MG tablet  Commonly known as: WELCHOL   625 mg, Oral, Daily      dicyclomine 20 MG tablet  Commonly known as: BENTYL   20 mg, Every 6 Hours PRN      famotidine 40 MG tablet  Commonly known as: PEPCID   40 mg, Nightly      hyoscyamine 0.125 MG SL tablet  Commonly known as: LEVSIN   0.125 mg, Sublingual, Every 6 Hours PRN      nortriptyline 50 MG capsule  Commonly known as: PAMELOR   50 mg, Nightly      ondansetron ODT 4 MG disintegrating tablet  Commonly known as: ZOFRAN-ODT   4 mg, Every 8 Hours PRN      Zenpep 27323-69813 units capsule delayed-release particles  Generic drug: Pancrelipase (Lip-Prot-Amyl)   2 capsules, 3 Times Daily With Meals               Discharge Diet:     Activity at Discharge:   Activity Instructions       Driving Restrictions      Type of Restriction: Driving    Driving Restrictions: No Driving While Taking Narcotics            Follow-up Appointments  Future Appointments   Date Time Provider Department Center   6/3/2025  9:00 AM Anita Blanco MD [facility]     Additional Instructions for the Follow-ups that You Need to Schedule       Discharge Follow-up with PCP   As directed       Currently Documented PCP:    Anita Blanco MD    PCP Phone Number:    [phone]     Follow Up Details: 5 to 7 days                Test Results Pending at Discharge  Pending  Results       None             Risk for Readmission (LACE) Score: 5 (1/23/2025  6:00 AM)      Greater than 30 minutes spent in discharge activities for this patient    Signature:Electronically signed by David Garcia PA-C, 01/23/25, 4:21 PM EST.

## 2025-01-23 NOTE — CASE MANAGEMENT/SOCIAL WORK
Discharge Planning Assessment   [facility]     Patient Name: Dianna Claros  MRN: [id number]  Today's Date: 1/23/2025    Admit Date: 1/22/2025    Plan: Routine home   Discharge Needs Assessment       Row Name 01/23/25 1048       Living Environment    People in Home spouse    Name(s) of People in Home  Will and 3 children ages 18 and 26.    Current Living Arrangements home    Potentially Unsafe Housing Conditions none    In the past 12 months has the electric, gas, oil, or water company threatened to shut off services in your home? No    Primary Care Provided by self    Provides Primary Care For no one    Family Caregiver if Needed spouse    Family Caregiver Names Will    Quality of Family Relationships helpful;involved;supportive    Able to Return to Prior Arrangements yes       Resource/Environmental Concerns    Resource/Environmental Concerns none    Transportation Concerns none       Transportation Needs    In the past 12 months, has lack of transportation kept you from medical appointments or from getting medications? no    In the past 12 months, has lack of transportation kept you from meetings, work, or from getting things needed for daily living? No       Food Insecurity    Within the past 12 months, you worried that your food would run out before you got the money to buy more. Never true    Within the past 12 months, the food you bought just didn't last and you didn't have money to get more. Never true       Transition Planning    Patient/Family Anticipates Transition to home with family    Patient/Family Anticipated Services at Transition none    Transportation Anticipated car, drives self;family or friend will provide       Discharge Needs Assessment    Readmission Within the Last 30 Days no previous admission in last 30 days    Equipment Currently Used at Home none    Concerns to be Addressed denies needs/concerns at this time    Anticipated Changes Related to Illness none    Equipment Needed  After Discharge none                   Discharge Plan       Row Name 01/23/25 1050       Plan    Plan Routine home    Plan Comments CM met with patient at the bedside. Confirmed PCP, insurance, and pharmacy. Patient agreeable in M2B. Patient denies any difficulty affording medications. Patient is not current with any HHC/OPPT/OT services. Patient lives at home with her  and 3 children, is independent with ADLS/IADLS, and drives. Patient states her family is able to provide DC transport. DC Barriers: clear liquid, IV pain medicine.              Demographic Summary       Row Name 01/23/25 1048       General Information    Admission Type observation    Arrived From emergency department    Referral Source admission list    Reason for Consult discharge planning    Preferred Language English                   Functional Status       Row Name 01/23/25 1048       Functional Status    Usual Activity Tolerance good    Current Activity Tolerance good       Functional Status, IADL    Medications independent    Meal Preparation independent    Housekeeping independent    Laundry independent    Shopping independent           Claudio Louis RN     Cell number [phone]  Office number [phone]

## 2025-01-23 NOTE — ED PROVIDER NOTES
Subjective   History of Present Illness  Patient is a 49-year-old female who presents to the emergency department with abdominal pain stating that she has chronic pancreatitis and this feels like other flareups.  Patient states that because she experienced the same pain today as an other acute pancreatitis attacks she has only been on a liquid diet for the past 12 hours but it has not helped; states the pain is only gotten worse in that period.  Patient denies any change of bowel or bladder habits, denies chest pain, shortness of breath, headache, or any other physical complaints at this time.      Review of Systems   Gastrointestinal:  Positive for abdominal pain.       Past Medical History:   Diagnosis Date    Endometriosis     Fibromyalgia     Hyperlipidemia     Hypertension        Allergies   Allergen Reactions    Roxicodone [Oxycodone Hcl] Itching and Nausea Only    Toradol [Ketorolac Tromethamine] Itching and Irritability    Gabapentin Nausea And Vomiting    Naproxen Nausea And Vomiting    Pregabalin Hives    Morphine Itching       Past Surgical History:   Procedure Laterality Date    ABDOMINAL SURGERY       SECTION      CHOLECYSTECTOMY WITH INTRAOPERATIVE CHOLANGIOGRAM N/A 2022    Procedure: CHOLECYSTECTOMY LAPAROSCOPIC INTRAOPERATIVE CHOLANGIOGRAM;  Surgeon: Robert Dale MD;  Location: [facility] MAIN OR;  Service: General;  Laterality: N/A;    COLONOSCOPY N/A 3/7/2023    Procedure: COLONOSCOPY with ileum and large intestine random biopsies R/O microscopic colitis;  Surgeon: NAVIN Schneider MD;  Location: [facility] ENDOSCOPY;  Service: Gastroenterology;  Laterality: N/A;  hemorrhoids    ENDOSCOPY N/A 2022    Procedure: ESOPHAGOGASTRODUODENOSCOPY WITH BIOPSY;  Surgeon: NAVIN Schneider MD;  Location: [facility] ENDOSCOPY;  Service: Gastroenterology;  Laterality: N/A;    HYSTERECTOMY      UPPER ENDOSCOPIC ULTRASOUND W/ FNA N/A 2023    Procedure: ESOPHAGOGASTRODUODENOSCOPY WITH ENDOSCOPIC  ULTRASOUND;  Surgeon: Malik King MD;  Location: [facility] ENDOSCOPY;  Service: Gastroenterology;  Laterality: N/A;  POST: CHRONIC PANCREATITIS       Family History   Problem Relation Age of Onset    Cancer Mother     Heart disease Father     Pancreatic cancer Sister        Social History     Socioeconomic History    Marital status:    Tobacco Use    Smoking status: Every Day     Current packs/day: 1.00     Average packs/day: 1 pack/day for 34.6 years (34.6 ttl pk-yrs)     Types: Cigarettes     Start date: 6/5/1990    Smokeless tobacco: Never   Vaping Use    Vaping status: Never Used   Substance and Sexual Activity    Alcohol use: Never    Drug use: Never    Sexual activity: Defer           Objective   Physical Exam  Constitutional:       General: She is in acute distress.      Appearance: She is obese. She is not ill-appearing or toxic-appearing.   HENT:      Head: Normocephalic.   Eyes:      Pupils: Pupils are equal, round, and reactive to light.   Cardiovascular:      Rate and Rhythm: Normal rate and regular rhythm.      Heart sounds: Normal heart sounds.      No friction rub. No gallop.   Pulmonary:      Effort: Pulmonary effort is normal.      Breath sounds: Normal breath sounds. No wheezing.   Chest:      Chest wall: No tenderness.   Abdominal:      General: Abdomen is protuberant. Bowel sounds are normal. There is no distension. There are no signs of injury.      Palpations: Abdomen is soft.      Tenderness: There is abdominal tenderness in the epigastric area. There is no right CVA tenderness, left CVA tenderness or rebound. Negative signs include Obregon's sign and McBurney's sign.      Hernia: No hernia is present.   Skin:     General: Skin is warm and dry.      Capillary Refill: Capillary refill takes less than 2 seconds.      Coloration: Skin is not jaundiced or pale.   Neurological:      General: No focal deficit present.      Mental Status: She is alert and oriented to person, place, and time.    Psychiatric:         Mood and Affect: Mood normal.         Behavior: Behavior normal.         Procedures           ED Course  ED Course as of 01/22/25 2048 Wed Jan 22, 2025 1925 Marked ready for CT [KW]      ED Course User Index  [KW] Crystal Canseco APRN                                                       Medical Decision Making  Patient is 49-year-old female who presents emergency department for complaints of abdominal pain stating she does have chronic pancreatitis; see HPI above.    See above for primary assessment and initial exam.    Patient placed in ED bed, IV established, and blood drawn for labs.  Patient given Dilaudid for pain and Zofran for nausea.  Chart review for patient revealed multiple CT scans all showing chronic pancreatitis; most recent scan on 12/24/2024.  Due to recent exams showing chronic pancreatitis initial decision is not to order CT scan for patient but to review labs and make decision at that time.    Lab results are as follows:  White count 7.27, lipase 55, eGFR 91.6.    Due to delta and lipase from time of previous CT scan showing pancreatitis till now decision is made to order CT to evaluate for changes.  CT results are as follows:  IMPRESSION:  1.No acute abdominal or pelvic abnormality. No evidence of pancreatitis.  2.Previous cholecystectomy and hysterectomy.  Results read by radiologist and reviewed by myself and Dr. Unruly Saldivar.    Lab and radiology reports discussed with Dr. Saldivar who indicates that a patient can have an acute on chronic pancreatitis exacerbation without elevated lipase and without radiographic evidence. Patient's clinical presentation is consistent with exacerbation of pancreatitis and all previous CT scans and history of elevated lipase support this diagnosis.     Patient updated on all results and presented with option of observation for IV hydration and pain control before discharge, patient requested observation to maintain pain control.   Physical exam repeated at time of patient update and is unchanged from previous with no clinical changes to report.  Patient given repeat dose of Dilaudid for pain control.    Overnight orders placed for observation status.        Problems Addressed:  Chronic pancreatitis, unspecified pancreatitis type: complicated acute illness or injury  Epigastric pain: complicated acute illness or injury    Amount and/or Complexity of Data Reviewed  Labs: ordered.  Radiology: ordered.  ECG/medicine tests: ordered.    Risk  OTC drugs.  Prescription drug management.  Decision regarding hospitalization.        Final diagnoses:   Epigastric pain   Chronic pancreatitis, unspecified pancreatitis type       ED Disposition  ED Disposition       ED Disposition   Decision to Admit    Condition   --    Comment   --               No follow-up provider specified.       Medication List      No changes were made to your prescriptions during this visit.            Crystal Canseco, APRN  01/22/25 3018

## 2025-01-23 NOTE — OUTREACH NOTE
Prep Survey      Flowsheet Row Responses   [facility]? [facility]   Is LACE score < 7 ? Yes   Eligibility [facility]   Date of Admission 01/22/25   Date of Discharge 01/23/25   Discharge Disposition Home or Self Care   Discharge diagnosis Abdominal pain   Does the patient have one of the following disease processes/diagnoses(primary or secondary)? Other   Does the patient have Home health ordered? No   Is there a DME ordered? No   Prep survey completed? Yes            [facility] - Registered Nurse

## 2025-01-24 ENCOUNTER — TRANSITIONAL CARE MANAGEMENT TELEPHONE ENCOUNTER (OUTPATIENT)
Dept: CALL CENTER | Facility: HOSPITAL | Age: 50
End: 2025-01-24
Payer: COMMERCIAL

## 2025-01-24 NOTE — OUTREACH NOTE
Call Center TCM Note      Flowsheet Row Responses   [facility] facility patient discharged from? [facility]   Does the patient have one of the following disease processes/diagnoses(primary or secondary)? Other   TCM attempt successful? No   Unsuccessful attempts Attempt 1            Elena Hein RN    1/24/2025, 09:50 EST

## 2025-01-24 NOTE — CASE MANAGEMENT/SOCIAL WORK
Case Management Discharge Note      Final Note: Routine home         Selected Continued Care - Discharged on 1/23/2025 Admission date: 1/22/2025 - Discharge disposition: Home or Self Care         Transportation Services  Private: Car    Final Discharge Disposition Code: 01 - home or self-care

## 2025-01-24 NOTE — OUTREACH NOTE
Call Center TCM Note      Flowsheet Row Responses   [facility] facility patient discharged from? [facility]   Does the patient have one of the following disease processes/diagnoses(primary or secondary)? Other   TCM attempt successful? No   Unsuccessful attempts Attempt 2            Elena Hein RN    1/24/2025, 14:10 EST

## 2025-01-25 ENCOUNTER — TRANSITIONAL CARE MANAGEMENT TELEPHONE ENCOUNTER (OUTPATIENT)
Dept: CALL CENTER | Facility: HOSPITAL | Age: 50
End: 2025-01-25
Payer: COMMERCIAL

## 2025-01-25 NOTE — OUTREACH NOTE
Call Center TCM Note      Flowsheet Row Responses   [facility] patient discharged from? [facility]   Does the patient have one of the following disease processes/diagnoses(primary or secondary)? Other   TCM attempt successful? Yes   Call start time 1021   Call end time 1026   Discharge diagnosis Abdominal pain   Meds reviewed with patient/caregiver? Yes   Is the patient having any side effects they believe may be caused by any medication additions or changes? No   Does the patient have all medications ordered at discharge? Yes   Is the patient taking all medications as directed (includes completed medication regime)? Yes   Does the patient have an appointment with their PCP within 7-14 days of discharge? No   Nursing Interventions Patient declined scheduling/rescheduling appointment at this time, Routed TCM call to PCP office   Has home health visited the patient within 72 hours of discharge? N/A   Psychosocial issues? No   Did the patient receive a copy of their discharge instructions? Yes   Nursing interventions Reviewed instructions with patient   What is the patient's perception of their health status since discharge? Improving   Is the patient/caregiver able to teach back signs and symptoms related to disease process for when to call PCP? Yes   Is the patient/caregiver able to teach back signs and symptoms related to disease process for when to call 911? Yes   Is the patient/caregiver able to teach back the hierarchy of who to call/visit for symptoms/problems? PCP, Specialist, Home health nurse, Urgent Care, ED, 911 Yes   TCM call completed? Yes   Wrap up additional comments Patient reports abdominal pian slightly improved, tolerating small amoubts of food.   Call end time 1026   Would this patient benefit from a Referral to Amb Social Work? No   Is the patient interested in additional calls from an ambulatory ? No            Shelly Tolliver RN    1/25/2025, 10:27 EST

## 2025-01-26 ENCOUNTER — TELEPHONE (OUTPATIENT)
Dept: FAMILY MEDICINE CLINIC | Facility: CLINIC | Age: 50
End: 2025-01-26
Payer: COMMERCIAL

## 2025-01-26 NOTE — TELEPHONE ENCOUNTER
Will be glad to recheck you this week for abdominal pain that caused observation at [facility]

## 2025-02-10 ENCOUNTER — HOSPITAL ENCOUNTER (EMERGENCY)
Facility: HOSPITAL | Age: 50
Discharge: HOME | End: 2025-02-10
Attending: EMERGENCY MEDICINE | Admitting: EMERGENCY MEDICINE
Payer: COMMERCIAL

## 2025-02-10 VITALS
SYSTOLIC BLOOD PRESSURE: 91 MMHG | OXYGEN SATURATION: 99 % | BODY MASS INDEX: 34.1 KG/M2 | RESPIRATION RATE: 16 BRPM | TEMPERATURE: 98.1 F | HEIGHT: 64 IN | WEIGHT: 199.74 LBS | HEART RATE: 69 BPM | DIASTOLIC BLOOD PRESSURE: 58 MMHG

## 2025-02-10 DIAGNOSIS — K85.90 ACUTE PANCREATITIS, UNSPECIFIED COMPLICATION STATUS, UNSPECIFIED PANCREATITIS TYPE: Primary | ICD-10-CM

## 2025-02-10 LAB
ALBUMIN SERPL-MCNC: 4.2 G/DL (ref 3.5–5.2)
ALBUMIN/GLOB SERPL: 1.5 G/DL
ALP SERPL-CCNC: 111 U/L (ref 39–117)
ALT SERPL W P-5'-P-CCNC: 18 U/L (ref 1–33)
ANION GAP SERPL CALCULATED.3IONS-SCNC: 11.8 MMOL/L (ref 5–15)
AST SERPL-CCNC: 20 U/L (ref 1–32)
BACTERIA UR QL AUTO: ABNORMAL /HPF
BASOPHILS # BLD AUTO: 0.03 10*3/MM3 (ref 0–0.2)
BASOPHILS NFR BLD AUTO: 0.3 % (ref 0–1.5)
BILIRUB SERPL-MCNC: 0.4 MG/DL (ref 0–1.2)
BILIRUB UR QL STRIP: NEGATIVE
BUN SERPL-MCNC: 9 MG/DL (ref 6–20)
BUN/CREAT SERPL: 10.6 (ref 7–25)
CALCIUM SPEC-SCNC: 9.5 MG/DL (ref 8.6–10.5)
CHLORIDE SERPL-SCNC: 104 MMOL/L (ref 98–107)
CLARITY UR: CLEAR
CO2 SERPL-SCNC: 24.2 MMOL/L (ref 22–29)
COLOR UR: YELLOW
CREAT SERPL-MCNC: 0.85 MG/DL (ref 0.57–1)
DEPRECATED RDW RBC AUTO: 42.9 FL (ref 37–54)
EGFRCR SERPLBLD CKD-EPI 2021: 85.2 ML/MIN/1.73
EOSINOPHIL # BLD AUTO: 0.13 10*3/MM3 (ref 0–0.4)
EOSINOPHIL NFR BLD AUTO: 1.3 % (ref 0.3–6.2)
ERYTHROCYTE [DISTWIDTH] IN BLOOD BY AUTOMATED COUNT: 12.9 % (ref 12.3–15.4)
GLOBULIN UR ELPH-MCNC: 2.8 GM/DL
GLUCOSE SERPL-MCNC: 90 MG/DL (ref 65–99)
GLUCOSE UR STRIP-MCNC: NEGATIVE MG/DL
HCT VFR BLD AUTO: 42.3 % (ref 34–46.6)
HGB BLD-MCNC: 13.5 G/DL (ref 12–15.9)
HGB UR QL STRIP.AUTO: ABNORMAL
HYALINE CASTS UR QL AUTO: ABNORMAL /LPF
IMM GRANULOCYTES # BLD AUTO: 0.03 10*3/MM3 (ref 0–0.05)
IMM GRANULOCYTES NFR BLD AUTO: 0.3 % (ref 0–0.5)
KETONES UR QL STRIP: NEGATIVE
LEUKOCYTE ESTERASE UR QL STRIP.AUTO: NEGATIVE
LIPASE SERPL-CCNC: 55 U/L (ref 13–60)
LYMPHOCYTES # BLD AUTO: 4.13 10*3/MM3 (ref 0.7–3.1)
LYMPHOCYTES NFR BLD AUTO: 40.9 % (ref 19.6–45.3)
MCH RBC QN AUTO: 29.2 PG (ref 26.6–33)
MCHC RBC AUTO-ENTMCNC: 31.9 G/DL (ref 31.5–35.7)
MCV RBC AUTO: 91.4 FL (ref 79–97)
MONOCYTES # BLD AUTO: 0.59 10*3/MM3 (ref 0.1–0.9)
MONOCYTES NFR BLD AUTO: 5.8 % (ref 5–12)
NEUTROPHILS NFR BLD AUTO: 5.19 10*3/MM3 (ref 1.7–7)
NEUTROPHILS NFR BLD AUTO: 51.4 % (ref 42.7–76)
NITRITE UR QL STRIP: NEGATIVE
NRBC BLD AUTO-RTO: 0 /100 WBC (ref 0–0.2)
PH UR STRIP.AUTO: 5.5 [PH] (ref 5–8)
PLATELET # BLD AUTO: 244 10*3/MM3 (ref 140–450)
PMV BLD AUTO: 11.1 FL (ref 6–12)
POTASSIUM SERPL-SCNC: 4.1 MMOL/L (ref 3.5–5.2)
PROT SERPL-MCNC: 7 G/DL (ref 6–8.5)
PROT UR QL STRIP: NEGATIVE
RBC # BLD AUTO: 4.63 10*6/MM3 (ref 3.77–5.28)
RBC # UR STRIP: ABNORMAL /HPF
REF LAB TEST METHOD: ABNORMAL
SODIUM SERPL-SCNC: 140 MMOL/L (ref 136–145)
SP GR UR STRIP: 1.02 (ref 1–1.03)
SQUAMOUS #/AREA URNS HPF: ABNORMAL /HPF
UROBILINOGEN UR QL STRIP: ABNORMAL
WBC # UR STRIP: ABNORMAL /HPF
WBC NRBC COR # BLD AUTO: 10.1 10*3/MM3 (ref 3.4–10.8)

## 2025-02-10 PROCEDURE — 25010000002 ONDANSETRON PER 1 MG: Performed by: EMERGENCY MEDICINE

## 2025-02-10 PROCEDURE — 25010000002 HYDROMORPHONE 1 MG/ML SOLUTION: Performed by: EMERGENCY MEDICINE

## 2025-02-10 PROCEDURE — 99283 EMERGENCY DEPT VISIT LOW MDM: CPT

## 2025-02-10 PROCEDURE — 80053 COMPREHEN METABOLIC PANEL: CPT | Performed by: NURSE PRACTITIONER

## 2025-02-10 PROCEDURE — 85025 COMPLETE CBC W/AUTO DIFF WBC: CPT | Performed by: NURSE PRACTITIONER

## 2025-02-10 PROCEDURE — 96374 THER/PROPH/DIAG INJ IV PUSH: CPT

## 2025-02-10 PROCEDURE — 83690 ASSAY OF LIPASE: CPT | Performed by: NURSE PRACTITIONER

## 2025-02-10 PROCEDURE — 81001 URINALYSIS AUTO W/SCOPE: CPT | Performed by: NURSE PRACTITIONER

## 2025-02-10 PROCEDURE — 96375 TX/PRO/DX INJ NEW DRUG ADDON: CPT

## 2025-02-10 RX ORDER — ONDANSETRON 2 MG/ML
4 INJECTION INTRAMUSCULAR; INTRAVENOUS ONCE
Status: COMPLETED | OUTPATIENT
Start: 2025-02-10 | End: 2025-02-10

## 2025-02-10 RX ORDER — SODIUM CHLORIDE 0.9 % (FLUSH) 0.9 %
10 SYRINGE (ML) INJECTION AS NEEDED
Status: DISCONTINUED | OUTPATIENT
Start: 2025-02-10 | End: 2025-02-11 | Stop reason: HOSPADM

## 2025-02-10 RX ORDER — PROMETHAZINE HYDROCHLORIDE 25 MG/1
25 TABLET ORAL ONCE
Status: DISCONTINUED | OUTPATIENT
Start: 2025-02-10 | End: 2025-02-10

## 2025-02-10 RX ORDER — ONDANSETRON 4 MG/1
4 TABLET, ORALLY DISINTEGRATING ORAL EVERY 8 HOURS PRN
Qty: 12 TABLET | Refills: 0 | Status: SHIPPED | OUTPATIENT
Start: 2025-02-10

## 2025-02-10 RX ORDER — OXYCODONE HYDROCHLORIDE 5 MG/1
5 TABLET ORAL EVERY 12 HOURS PRN
Qty: 6 TABLET | Refills: 0 | Status: SHIPPED | OUTPATIENT
Start: 2025-02-10

## 2025-02-10 RX ADMIN — HYDROMORPHONE HYDROCHLORIDE 0.5 MG: 1 INJECTION, SOLUTION INTRAMUSCULAR; INTRAVENOUS; SUBCUTANEOUS at 22:36

## 2025-02-10 RX ADMIN — ONDANSETRON 4 MG: 2 INJECTION, SOLUTION INTRAMUSCULAR; INTRAVENOUS at 22:36

## 2025-02-11 NOTE — ED PROVIDER NOTES
Provider in Triage Note  Patient is a 49-year-old female presents the ED for evaluation of upper abdominal pain.  She wishes a history of chronic pancreatitis this pain feels similar.  No fevers she has diarrhea at baseline is not abnormal for her.    Admission end of January for similar.    Chart Review: 25  IMPRESSION:  1.No acute abdominal or pelvic abnormality. No evidence of pancreatitis.  2.Previous cholecystectomy and hysterectomy.    Observation for chronic pancreatitis on 25        Due to significant overcrowding in the emergency department patient was initially seen and evaluated in triage.  Provider in triage recommended patient placement in the treatment area to initiate therapy and movement to an ER bed as soon as possible.   Orders placed; medications will be deferred to main provider per protocol.        Subjective   History of Present Illness  Agree with above unless otherwise noted  Review of Systems  See above.  Past Medical History:   Diagnosis Date    Endometriosis     Fibromyalgia     Hyperlipidemia     Hypertension        Allergies   Allergen Reactions    Roxicodone [Oxycodone Hcl] Itching and Nausea Only    Toradol [Ketorolac Tromethamine] Itching and Irritability    Gabapentin Nausea And Vomiting    Naproxen Nausea And Vomiting    Pregabalin Hives    Morphine Itching       Past Surgical History:   Procedure Laterality Date    ABDOMINAL SURGERY       SECTION      CHOLECYSTECTOMY WITH INTRAOPERATIVE CHOLANGIOGRAM N/A 2022    Procedure: CHOLECYSTECTOMY LAPAROSCOPIC INTRAOPERATIVE CHOLANGIOGRAM;  Surgeon: Robert Dale MD;  Location: [facility] MAIN OR;  Service: General;  Laterality: N/A;    COLONOSCOPY N/A 3/7/2023    Procedure: COLONOSCOPY with ileum and large intestine random biopsies R/O microscopic colitis;  Surgeon: NAVIN Schneider MD;  Location: [facility] ENDOSCOPY;  Service: Gastroenterology;  Laterality: N/A;  hemorrhoids    ENDOSCOPY N/A 2022    Procedure:  "ESOPHAGOGASTRODUODENOSCOPY WITH BIOPSY;  Surgeon: NAVIN Schneider MD;  Location: [facility] ENDOSCOPY;  Service: Gastroenterology;  Laterality: N/A;    HYSTERECTOMY      UPPER ENDOSCOPIC ULTRASOUND W/ FNA N/A 12/4/2023    Procedure: ESOPHAGOGASTRODUODENOSCOPY WITH ENDOSCOPIC ULTRASOUND;  Surgeon: Malik King MD;  Location: [facility] ENDOSCOPY;  Service: Gastroenterology;  Laterality: N/A;  POST: CHRONIC PANCREATITIS       Family History   Problem Relation Age of Onset    Cancer Mother     Heart disease Father     Pancreatic cancer Sister        Social History     Socioeconomic History    Marital status:    Tobacco Use    Smoking status: Every Day     Current packs/day: 1.00     Average packs/day: 1 pack/day for 34.7 years (34.7 ttl pk-yrs)     Types: Cigarettes     Start date: 6/5/1990    Smokeless tobacco: Never   Vaping Use    Vaping status: Never Used   Substance and Sexual Activity    Alcohol use: Never    Drug use: Never    Sexual activity: Defer           Objective   Physical Exam  Constitutional:  No acute distress.  Head:  Atraumatic.  Normocephalic.   Eyes:  No scleral icterus. Normal conjunctivae  ENT:  Moist mucosa.  No nasal discharge present.  Cardiovascular:  Well perfused.  Equal pulses.  Regular rate.  Normal capillary refill.    Pulmonary/Chest:  No respiratory distress.  Airway patent.  No tachypnea.  No accessory muscle usage.    Abdominal: Left upper quadrant pain without peritoneal signs.  Extremities:  No peripheral edema.  No Deformity  Skin:  Warm, dry  Neurological:  Alert, awake, and appropriate.  Normal speech.      Procedures           ED Course      BP 91/58   Pulse 69   Temp 98.1 °F (36.7 °C) (Oral)   Resp 16   Ht 162.6 cm (64\")   Wt 90.6 kg (199 lb 11.8 oz)   SpO2 99%   BMI 34.28 kg/m²   Labs Reviewed   URINALYSIS W/ MICROSCOPIC IF INDICATED (NO CULTURE) - Abnormal; Notable for the following components:       Result Value    Blood, UA Trace (*)     All other components " within normal limits   CBC WITH AUTO DIFFERENTIAL - Abnormal; Notable for the following components:    Lymphocytes, Absolute 4.13 (*)     All other components within normal limits   URINALYSIS, MICROSCOPIC ONLY - Abnormal; Notable for the following components:    Squamous Epithelial Cells, UA 3-6 (*)     All other components within normal limits   LIPASE - Normal   COMPREHENSIVE METABOLIC PANEL    Narrative:     GFR Categories in Chronic Kidney Disease (CKD)      GFR Category          GFR (mL/min/1.73)    Interpretation  G1                     90 or greater         Normal or high (1)  G2                      60-89                Mild decrease (1)  G3a                   45-59                Mild to moderate decrease  G3b                   30-44                Moderate to severe decrease  G4                    15-29                Severe decrease  G5                    14 or less           Kidney failure          (1)In the absence of evidence of kidney disease, neither GFR category G1 or G2 fulfill the criteria for CKD.    eGFR calculation 2021 CKD-EPI creatinine equation, which does not include race as a factor   CBC AND DIFFERENTIAL    Narrative:     The following orders were created for panel order CBC & Differential.  Procedure                               Abnormality         Status                     ---------                               -----------         ------                     CBC Auto Differential[[id number]]        Abnormal            Final result                 Please view results for these tests on the individual orders.     Medications   sodium chloride 0.9 % flush 10 mL (has no administration in time range)   ondansetron (ZOFRAN) injection 4 mg (4 mg Intravenous Given 2/10/25 2236)   HYDROmorphone (DILAUDID) injection 0.5 mg (0.5 mg Intravenous Given 2/10/25 2236)     No orders to display                                                      Medical Decision Making  Problems Addressed:  Acute  pancreatitis, unspecified complication status, unspecified pancreatitis type: complicated acute illness or injury    Amount and/or Complexity of Data Reviewed  Labs: ordered.    Risk  Prescription drug management.    Lipase negative.  Reassuring white count.  Reassuring labs and vital signs.  No peritoneal signs on exam.  Discussed in detail with patient regarding CT and admission versus discharge.  Was agreeable with deferring CT given reassuring labs.  Patient states she would prefer to be discharged home with pain medication and nausea medication.  States she will follow a clear liquid diet that she will advance slowly.  Return ER precautions were discussed in detail.    Final diagnoses:   Acute pancreatitis, unspecified complication status, unspecified pancreatitis type       ED Disposition  ED Disposition       ED Disposition   Discharge    Condition   Stable    Comment   --               Anita Blanco MD  [address] [facility] [place] [id number] [phone]    In 3 days           Medication List        New Prescriptions      naloxone 4 MG/0.1ML nasal spray  Commonly known as: NARCAN  Call 911. Don't prime. [place] in 1 nostril for overdose. Repeat in 2-3 minutes in other nostril if no or minimal breathing/responsiveness.            Changed      oxyCODONE 5 MG immediate release tablet  Commonly known as: Roxicodone  Take 1 tablet by mouth Every 12 (Twelve) Hours As Needed for Moderate Pain.  What changed: when to take this               Where to Get Your Medications        These medications were sent to [id number] - [place] - [address] - [phone]  - [phone] FX  [address]  [place] [address]      Phone: [phone]   naloxone 4 MG/0.1ML nasal spray  ondansetron ODT 4 MG disintegrating tablet  oxyCODONE 5 MG immediate release tablet            Jimbo Eubanks MD  02/11/25 0057

## 2025-02-20 ENCOUNTER — OFFICE (OUTPATIENT)
Dept: URBAN - METROPOLITAN AREA CLINIC 64 | Facility: CLINIC | Age: 50
End: 2025-02-20
Payer: COMMERCIAL

## 2025-02-20 VITALS
HEIGHT: 64 IN | SYSTOLIC BLOOD PRESSURE: 108 MMHG | DIASTOLIC BLOOD PRESSURE: 77 MMHG | WEIGHT: 197 LBS | HEART RATE: 79 BPM

## 2025-02-20 DIAGNOSIS — F17.290 NICOTINE DEPENDENCE, OTHER TOBACCO PRODUCT, UNCOMPLICATED: ICD-10-CM

## 2025-02-20 DIAGNOSIS — R11.0 NAUSEA: ICD-10-CM

## 2025-02-20 DIAGNOSIS — K86.1 OTHER CHRONIC PANCREATITIS: ICD-10-CM

## 2025-02-20 DIAGNOSIS — R10.13 EPIGASTRIC PAIN: ICD-10-CM

## 2025-02-20 DIAGNOSIS — R19.7 DIARRHEA, UNSPECIFIED: ICD-10-CM

## 2025-02-20 PROCEDURE — 99214 OFFICE O/P EST MOD 30 MIN: CPT

## 2025-02-20 RX ORDER — DICYCLOMINE HYDROCHLORIDE 20 MG/1
80 TABLET ORAL
Qty: 120 | Refills: 12 | Status: COMPLETED
Start: 2024-10-28 | End: 2025-02-20 | Stop reason: SDUPTHER

## 2025-02-20 RX ORDER — NORTRIPTYLINE HYDROCHLORIDE 75 MG/1
75 CAPSULE ORAL
Qty: 90 | Refills: 3 | Status: ACTIVE
Start: 2024-05-20

## 2025-03-07 ENCOUNTER — APPOINTMENT (OUTPATIENT)
Dept: CT IMAGING | Facility: HOSPITAL | Age: 50
End: 2025-03-07
Payer: COMMERCIAL

## 2025-03-07 ENCOUNTER — HOSPITAL ENCOUNTER (EMERGENCY)
Facility: HOSPITAL | Age: 50
Discharge: HOME | End: 2025-03-07
Attending: EMERGENCY MEDICINE
Payer: COMMERCIAL

## 2025-03-07 VITALS
RESPIRATION RATE: 16 BRPM | BODY MASS INDEX: 34.25 KG/M2 | TEMPERATURE: 97.8 F | HEIGHT: 64 IN | HEART RATE: 80 BPM | DIASTOLIC BLOOD PRESSURE: 70 MMHG | OXYGEN SATURATION: 94 % | SYSTOLIC BLOOD PRESSURE: 116 MMHG | WEIGHT: 200.62 LBS

## 2025-03-07 DIAGNOSIS — R10.9 ABDOMINAL PAIN, UNSPECIFIED ABDOMINAL LOCATION: Primary | ICD-10-CM

## 2025-03-07 LAB
ALBUMIN SERPL-MCNC: 4.1 G/DL (ref 3.5–5.2)
ALBUMIN/GLOB SERPL: 1.7 G/DL
ALP SERPL-CCNC: 103 U/L (ref 39–117)
ALT SERPL W P-5'-P-CCNC: 19 U/L (ref 1–33)
ANION GAP SERPL CALCULATED.3IONS-SCNC: 11.9 MMOL/L (ref 5–15)
AST SERPL-CCNC: 15 U/L (ref 1–32)
BASOPHILS # BLD AUTO: 0.02 10*3/MM3 (ref 0–0.2)
BASOPHILS NFR BLD AUTO: 0.2 % (ref 0–1.5)
BILIRUB SERPL-MCNC: 0.3 MG/DL (ref 0–1.2)
BUN SERPL-MCNC: 8 MG/DL (ref 6–20)
BUN/CREAT SERPL: 8.2 (ref 7–25)
CALCIUM SPEC-SCNC: 9 MG/DL (ref 8.6–10.5)
CHLORIDE SERPL-SCNC: 105 MMOL/L (ref 98–107)
CO2 SERPL-SCNC: 25.1 MMOL/L (ref 22–29)
CREAT SERPL-MCNC: 0.97 MG/DL (ref 0.57–1)
DEPRECATED RDW RBC AUTO: 42.9 FL (ref 37–54)
EGFRCR SERPLBLD CKD-EPI 2021: 72.7 ML/MIN/1.73
EOSINOPHIL # BLD AUTO: 0.12 10*3/MM3 (ref 0–0.4)
EOSINOPHIL NFR BLD AUTO: 1.4 % (ref 0.3–6.2)
ERYTHROCYTE [DISTWIDTH] IN BLOOD BY AUTOMATED COUNT: 13 % (ref 12.3–15.4)
GLOBULIN UR ELPH-MCNC: 2.4 GM/DL
GLUCOSE SERPL-MCNC: 95 MG/DL (ref 65–99)
HCT VFR BLD AUTO: 40.2 % (ref 34–46.6)
HGB BLD-MCNC: 12.8 G/DL (ref 12–15.9)
HOLD SPECIMEN: NORMAL
IMM GRANULOCYTES # BLD AUTO: 0.03 10*3/MM3 (ref 0–0.05)
IMM GRANULOCYTES NFR BLD AUTO: 0.4 % (ref 0–0.5)
LIPASE SERPL-CCNC: 55 U/L (ref 13–60)
LYMPHOCYTES # BLD AUTO: 3.47 10*3/MM3 (ref 0.7–3.1)
LYMPHOCYTES NFR BLD AUTO: 41.8 % (ref 19.6–45.3)
MCH RBC QN AUTO: 28.7 PG (ref 26.6–33)
MCHC RBC AUTO-ENTMCNC: 31.8 G/DL (ref 31.5–35.7)
MCV RBC AUTO: 90.1 FL (ref 79–97)
MONOCYTES # BLD AUTO: 0.52 10*3/MM3 (ref 0.1–0.9)
MONOCYTES NFR BLD AUTO: 6.3 % (ref 5–12)
NEUTROPHILS NFR BLD AUTO: 4.15 10*3/MM3 (ref 1.7–7)
NEUTROPHILS NFR BLD AUTO: 49.9 % (ref 42.7–76)
NRBC BLD AUTO-RTO: 0 /100 WBC (ref 0–0.2)
PLATELET # BLD AUTO: 226 10*3/MM3 (ref 140–450)
PMV BLD AUTO: 10.5 FL (ref 6–12)
POTASSIUM SERPL-SCNC: 4 MMOL/L (ref 3.5–5.2)
PROT SERPL-MCNC: 6.5 G/DL (ref 6–8.5)
RBC # BLD AUTO: 4.46 10*6/MM3 (ref 3.77–5.28)
SODIUM SERPL-SCNC: 142 MMOL/L (ref 136–145)
WBC NRBC COR # BLD AUTO: 8.31 10*3/MM3 (ref 3.4–10.8)

## 2025-03-07 PROCEDURE — 25010000002 HYDROMORPHONE 1 MG/ML SOLUTION: Performed by: EMERGENCY MEDICINE

## 2025-03-07 PROCEDURE — 25010000002 DIPHENHYDRAMINE PER 50 MG: Performed by: EMERGENCY MEDICINE

## 2025-03-07 PROCEDURE — 25510000001 IOPAMIDOL PER 1 ML: Performed by: EMERGENCY MEDICINE

## 2025-03-07 PROCEDURE — 96374 THER/PROPH/DIAG INJ IV PUSH: CPT

## 2025-03-07 PROCEDURE — 96375 TX/PRO/DX INJ NEW DRUG ADDON: CPT

## 2025-03-07 PROCEDURE — 74177 CT ABD & PELVIS W/CONTRAST: CPT

## 2025-03-07 PROCEDURE — 25010000002 ONDANSETRON PER 1 MG: Performed by: EMERGENCY MEDICINE

## 2025-03-07 PROCEDURE — 99285 EMERGENCY DEPT VISIT HI MDM: CPT

## 2025-03-07 PROCEDURE — 85025 COMPLETE CBC W/AUTO DIFF WBC: CPT | Performed by: EMERGENCY MEDICINE

## 2025-03-07 PROCEDURE — 80053 COMPREHEN METABOLIC PANEL: CPT | Performed by: EMERGENCY MEDICINE

## 2025-03-07 PROCEDURE — 83690 ASSAY OF LIPASE: CPT | Performed by: EMERGENCY MEDICINE

## 2025-03-07 RX ORDER — SODIUM CHLORIDE 0.9 % (FLUSH) 0.9 %
10 SYRINGE (ML) INJECTION AS NEEDED
Status: DISCONTINUED | OUTPATIENT
Start: 2025-03-07 | End: 2025-03-08 | Stop reason: HOSPADM

## 2025-03-07 RX ORDER — DIPHENHYDRAMINE HYDROCHLORIDE 50 MG/ML
12.5 INJECTION INTRAMUSCULAR; INTRAVENOUS ONCE
Status: COMPLETED | OUTPATIENT
Start: 2025-03-07 | End: 2025-03-07

## 2025-03-07 RX ORDER — IOPAMIDOL 755 MG/ML
100 INJECTION, SOLUTION INTRAVASCULAR
Status: COMPLETED | OUTPATIENT
Start: 2025-03-07 | End: 2025-03-07

## 2025-03-07 RX ORDER — ONDANSETRON 2 MG/ML
4 INJECTION INTRAMUSCULAR; INTRAVENOUS ONCE
Status: COMPLETED | OUTPATIENT
Start: 2025-03-07 | End: 2025-03-07

## 2025-03-07 RX ADMIN — HYDROMORPHONE HYDROCHLORIDE 1 MG: 1 INJECTION, SOLUTION INTRAMUSCULAR; INTRAVENOUS; SUBCUTANEOUS at 20:45

## 2025-03-07 RX ADMIN — ONDANSETRON 4 MG: 2 INJECTION, SOLUTION INTRAMUSCULAR; INTRAVENOUS at 20:45

## 2025-03-07 RX ADMIN — DIPHENHYDRAMINE HYDROCHLORIDE 12.5 MG: 50 INJECTION, SOLUTION INTRAMUSCULAR; INTRAVENOUS at 22:46

## 2025-03-07 RX ADMIN — IOPAMIDOL 100 ML: 755 INJECTION, SOLUTION INTRAVENOUS at 21:25

## 2025-03-08 NOTE — ED PROVIDER NOTES
Subjective   History of Present Illness  Chief complaint: Abdominal pain    49-year-old female presents with abdominal pain.  Patient states she has a history of pancreatitis and feels like she is having a flareup.  She is having pain in the upper abdomen as well as nausea.  She has been taking nausea medicine.  She denies any actual vomiting or diarrhea.  She has had no fever.  She states she has been having some abdominal pain for the past couple days but it got worse today.    History provided by:  Patient      Review of Systems   Constitutional:  Negative for fever.   HENT:  Negative for congestion.    Respiratory:  Negative for cough and shortness of breath.    Cardiovascular:  Negative for chest pain.   Gastrointestinal:  Positive for abdominal pain and nausea. Negative for diarrhea and vomiting.   Musculoskeletal:  Negative for back pain.   Neurological:  Negative for headaches.   Psychiatric/Behavioral:  Negative for confusion.        Past Medical History:   Diagnosis Date    Endometriosis     Fibromyalgia     Hyperlipidemia     Hypertension        Allergies   Allergen Reactions    Roxicodone [Oxycodone Hcl] Itching and Nausea Only    Toradol [Ketorolac Tromethamine] Itching and Irritability    Gabapentin Nausea And Vomiting    Naproxen Nausea And Vomiting    Pregabalin Hives    Morphine Itching       Past Surgical History:   Procedure Laterality Date    ABDOMINAL SURGERY       SECTION      CHOLECYSTECTOMY WITH INTRAOPERATIVE CHOLANGIOGRAM N/A 2022    Procedure: CHOLECYSTECTOMY LAPAROSCOPIC INTRAOPERATIVE CHOLANGIOGRAM;  Surgeon: Robert Dale MD;  Location: [facility] MAIN OR;  Service: General;  Laterality: N/A;    COLONOSCOPY N/A 3/7/2023    Procedure: COLONOSCOPY with ileum and large intestine random biopsies R/O microscopic colitis;  Surgeon: NAVIN Schneider MD;  Location: [facility] ENDOSCOPY;  Service: Gastroenterology;  Laterality: N/A;  hemorrhoids    ENDOSCOPY N/A 2022    Procedure:  "ESOPHAGOGASTRODUODENOSCOPY WITH BIOPSY;  Surgeon: NAVIN Schneider MD;  Location: [facility] ENDOSCOPY;  Service: Gastroenterology;  Laterality: N/A;    HYSTERECTOMY      UPPER ENDOSCOPIC ULTRASOUND W/ FNA N/A 12/4/2023    Procedure: ESOPHAGOGASTRODUODENOSCOPY WITH ENDOSCOPIC ULTRASOUND;  Surgeon: Malik King MD;  Location: [facility] ENDOSCOPY;  Service: Gastroenterology;  Laterality: N/A;  POST: CHRONIC PANCREATITIS       Family History   Problem Relation Age of Onset    Cancer Mother     Heart disease Father     Pancreatic cancer Sister        Social History     Socioeconomic History    Marital status:    Tobacco Use    Smoking status: Every Day     Current packs/day: 1.00     Average packs/day: 1 pack/day for 34.8 years (34.8 ttl pk-yrs)     Types: Cigarettes     Start date: 6/5/1990    Smokeless tobacco: Never   Vaping Use    Vaping status: Never Used   Substance and Sexual Activity    Alcohol use: Never    Drug use: Never    Sexual activity: Defer       /70   Pulse 73   Temp 97.8 °F (36.6 °C)   Resp 16   Ht 162.6 cm (64\")   Wt 91 kg (200 lb 9.9 oz)   SpO2 95%   BMI 34.44 kg/m²       Objective   Physical Exam  Vitals and nursing note reviewed.   Constitutional:       Appearance: She is well-developed.   HENT:      Head: Normocephalic and atraumatic.      Mouth/Throat:      Mouth: Mucous membranes are moist.   Cardiovascular:      Rate and Rhythm: Normal rate.      Heart sounds: Normal heart sounds.   Pulmonary:      Effort: Pulmonary effort is normal. No respiratory distress.      Breath sounds: Normal breath sounds.   Abdominal:      General: Bowel sounds are normal.      Palpations: Abdomen is soft.      Tenderness: There is abdominal tenderness in the right upper quadrant and epigastric area. There is no guarding or rebound.   Skin:     General: Skin is warm and dry.   Neurological:      Mental Status: She is alert and oriented to person, place, and time.         Procedures           ED " Course      Results for orders placed or performed during the hospital encounter of 03/07/25   Comprehensive Metabolic Panel    Collection Time: 03/07/25  8:42 PM    Specimen: Blood   Result Value Ref Range    Glucose 95 65 - 99 mg/dL    BUN 8 6 - 20 mg/dL    Creatinine 0.97 0.57 - 1.00 mg/dL    Sodium 142 136 - 145 mmol/L    Potassium 4.0 3.5 - 5.2 mmol/L    Chloride 105 98 - 107 mmol/L    CO2 25.1 22.0 - 29.0 mmol/L    Calcium 9.0 8.6 - 10.5 mg/dL    Total Protein 6.5 6.0 - 8.5 g/dL    Albumin 4.1 3.5 - 5.2 g/dL    ALT (SGPT) 19 1 - 33 U/L    AST (SGOT) 15 1 - 32 U/L    Alkaline Phosphatase 103 39 - 117 U/L    Total Bilirubin 0.3 0.0 - 1.2 mg/dL    Globulin 2.4 gm/dL    A/G Ratio 1.7 g/dL    BUN/Creatinine Ratio 8.2 7.0 - 25.0    Anion Gap 11.9 5.0 - 15.0 mmol/L    eGFR 72.7 >60.0 mL/min/1.73   Lipase    Collection Time: 03/07/25  8:42 PM    Specimen: Blood   Result Value Ref Range    Lipase 55 13 - 60 U/L   CBC Auto Differential    Collection Time: 03/07/25  8:42 PM    Specimen: Blood   Result Value Ref Range    WBC 8.31 3.40 - 10.80 10*3/mm3    RBC 4.46 3.77 - 5.28 10*6/mm3    Hemoglobin 12.8 12.0 - 15.9 g/dL    Hematocrit 40.2 34.0 - 46.6 %    MCV 90.1 79.0 - 97.0 fL    MCH 28.7 26.6 - 33.0 pg    MCHC 31.8 31.5 - 35.7 g/dL    RDW 13.0 12.3 - 15.4 %    RDW-SD 42.9 37.0 - 54.0 fl    MPV 10.5 6.0 - 12.0 fL    Platelets 226 140 - 450 10*3/mm3    Neutrophil % 49.9 42.7 - 76.0 %    Lymphocyte % 41.8 19.6 - 45.3 %    Monocyte % 6.3 5.0 - 12.0 %    Eosinophil % 1.4 0.3 - 6.2 %    Basophil % 0.2 0.0 - 1.5 %    Immature Grans % 0.4 0.0 - 0.5 %    Neutrophils, Absolute 4.15 1.70 - 7.00 10*3/mm3    Lymphocytes, Absolute 3.47 (H) 0.70 - 3.10 10*3/mm3    Monocytes, Absolute 0.52 0.10 - 0.90 10*3/mm3    Eosinophils, Absolute 0.12 0.00 - 0.40 10*3/mm3    Basophils, Absolute 0.02 0.00 - 0.20 10*3/mm3    Immature Grans, Absolute 0.03 0.00 - 0.05 10*3/mm3    nRBC 0.0 0.0 - 0.2 /100 WBC   Gold Top - [facility]    Collection Time:  03/07/25  8:42 PM   Result Value Ref Range    Extra Tube Hold for add-ons.      CT Abdomen Pelvis With Contrast    Result Date: 3/7/2025  No acute abdominal or pelvic pathology. Electronically Signed: Keenan Davis MD  3/7/2025 10:21 PM EST  Workstation ID: [id number]                                                    Medical Decision Making  Amount and/or Complexity of Data Reviewed  Labs: ordered.  Radiology: ordered.    Risk  Prescription drug management.      Patient evaluation.  Results were discussed with the patient.  CT of the abdomen and pelvis showed no acute abnormality.  White blood cell count is normal.  CMP and lipase are normal.  Patient has a benign abdominal exam.  She is feeling better after pain and nausea medicine in the emergency room.  She will be discharged to follow-up with her primary provider and gastroenterologist.      Final diagnoses:   Abdominal pain, unspecified abdominal location       ED Disposition  ED Disposition       ED Disposition   Discharge    Condition   Stable    Comment   --               Anita Blanco MD  [address] [place] [address]  [phone]    Call in 2 days           Medication List      No changes were made to your prescriptions during this visit.            Maurice Whittaker MD  03/07/25 8308

## 2025-03-08 NOTE — DISCHARGE INSTRUCTIONS
Follow-up with your primary provider and gastroenterologist.  Return to the emergency room for any new or worsening symptoms or if you have any other questions or concerns.

## 2025-04-18 ENCOUNTER — HOSPITAL ENCOUNTER (EMERGENCY)
Facility: HOSPITAL | Age: 50
Discharge: HOME | End: 2025-04-18
Attending: EMERGENCY MEDICINE
Payer: COMMERCIAL

## 2025-04-18 VITALS
BODY MASS INDEX: 33.8 KG/M2 | OXYGEN SATURATION: 97 % | WEIGHT: 198 LBS | DIASTOLIC BLOOD PRESSURE: 70 MMHG | SYSTOLIC BLOOD PRESSURE: 109 MMHG | HEART RATE: 78 BPM | HEIGHT: 64 IN | TEMPERATURE: 97.7 F | RESPIRATION RATE: 20 BRPM

## 2025-04-18 DIAGNOSIS — K85.90 ACUTE PANCREATITIS, UNSPECIFIED COMPLICATION STATUS, UNSPECIFIED PANCREATITIS TYPE: Primary | ICD-10-CM

## 2025-04-18 LAB
ALBUMIN SERPL-MCNC: 4.3 G/DL (ref 3.5–5.2)
ALBUMIN/GLOB SERPL: 1.6 G/DL
ALP SERPL-CCNC: 126 U/L (ref 39–117)
ALT SERPL W P-5'-P-CCNC: 23 U/L (ref 1–33)
ANION GAP SERPL CALCULATED.3IONS-SCNC: 10.5 MMOL/L (ref 5–15)
AST SERPL-CCNC: 21 U/L (ref 1–32)
BACTERIA UR QL AUTO: ABNORMAL /HPF
BASOPHILS # BLD AUTO: 0.03 10*3/MM3 (ref 0–0.2)
BASOPHILS NFR BLD AUTO: 0.3 % (ref 0–1.5)
BILIRUB SERPL-MCNC: 0.3 MG/DL (ref 0–1.2)
BILIRUB UR QL STRIP: NEGATIVE
BUN SERPL-MCNC: 8 MG/DL (ref 6–20)
BUN/CREAT SERPL: 8.2 (ref 7–25)
CALCIUM SPEC-SCNC: 9.2 MG/DL (ref 8.6–10.5)
CHLORIDE SERPL-SCNC: 105 MMOL/L (ref 98–107)
CLARITY UR: CLEAR
CO2 SERPL-SCNC: 22.5 MMOL/L (ref 22–29)
COLOR UR: YELLOW
CREAT SERPL-MCNC: 0.98 MG/DL (ref 0.57–1)
DEPRECATED RDW RBC AUTO: 43.9 FL (ref 37–54)
EGFRCR SERPLBLD CKD-EPI 2021: 71.8 ML/MIN/1.73
EOSINOPHIL # BLD AUTO: 0.12 10*3/MM3 (ref 0–0.4)
EOSINOPHIL NFR BLD AUTO: 1.3 % (ref 0.3–6.2)
ERYTHROCYTE [DISTWIDTH] IN BLOOD BY AUTOMATED COUNT: 13.2 % (ref 12.3–15.4)
GLOBULIN UR ELPH-MCNC: 2.7 GM/DL
GLUCOSE SERPL-MCNC: 97 MG/DL (ref 65–99)
GLUCOSE UR STRIP-MCNC: NEGATIVE MG/DL
HCT VFR BLD AUTO: 42.1 % (ref 34–46.6)
HGB BLD-MCNC: 13.3 G/DL (ref 12–15.9)
HGB UR QL STRIP.AUTO: ABNORMAL
HOLD SPECIMEN: NORMAL
HYALINE CASTS UR QL AUTO: ABNORMAL /LPF
IMM GRANULOCYTES # BLD AUTO: 0.03 10*3/MM3 (ref 0–0.05)
IMM GRANULOCYTES NFR BLD AUTO: 0.3 % (ref 0–0.5)
KETONES UR QL STRIP: ABNORMAL
LEUKOCYTE ESTERASE UR QL STRIP.AUTO: NEGATIVE
LIPASE SERPL-CCNC: 90 U/L (ref 13–60)
LYMPHOCYTES # BLD AUTO: 3.65 10*3/MM3 (ref 0.7–3.1)
LYMPHOCYTES NFR BLD AUTO: 38.7 % (ref 19.6–45.3)
MCH RBC QN AUTO: 28.7 PG (ref 26.6–33)
MCHC RBC AUTO-ENTMCNC: 31.6 G/DL (ref 31.5–35.7)
MCV RBC AUTO: 90.7 FL (ref 79–97)
MONOCYTES # BLD AUTO: 0.63 10*3/MM3 (ref 0.1–0.9)
MONOCYTES NFR BLD AUTO: 6.7 % (ref 5–12)
NEUTROPHILS NFR BLD AUTO: 4.96 10*3/MM3 (ref 1.7–7)
NEUTROPHILS NFR BLD AUTO: 52.7 % (ref 42.7–76)
NITRITE UR QL STRIP: NEGATIVE
NRBC BLD AUTO-RTO: 0 /100 WBC (ref 0–0.2)
PH UR STRIP.AUTO: 5.5 [PH] (ref 5–8)
PLATELET # BLD AUTO: 255 10*3/MM3 (ref 140–450)
PMV BLD AUTO: 10.5 FL (ref 6–12)
POTASSIUM SERPL-SCNC: 3.9 MMOL/L (ref 3.5–5.2)
PROT SERPL-MCNC: 7 G/DL (ref 6–8.5)
PROT UR QL STRIP: NEGATIVE
RBC # BLD AUTO: 4.64 10*6/MM3 (ref 3.77–5.28)
RBC # UR STRIP: ABNORMAL /HPF
REF LAB TEST METHOD: ABNORMAL
SODIUM SERPL-SCNC: 138 MMOL/L (ref 136–145)
SP GR UR STRIP: 1.02 (ref 1–1.03)
SQUAMOUS #/AREA URNS HPF: ABNORMAL /HPF
UROBILINOGEN UR QL STRIP: ABNORMAL
WBC # UR STRIP: ABNORMAL /HPF
WBC NRBC COR # BLD AUTO: 9.42 10*3/MM3 (ref 3.4–10.8)
WHOLE BLOOD HOLD COAG: NORMAL

## 2025-04-18 PROCEDURE — 81001 URINALYSIS AUTO W/SCOPE: CPT | Performed by: EMERGENCY MEDICINE

## 2025-04-18 PROCEDURE — 96374 THER/PROPH/DIAG INJ IV PUSH: CPT

## 2025-04-18 PROCEDURE — 80053 COMPREHEN METABOLIC PANEL: CPT | Performed by: EMERGENCY MEDICINE

## 2025-04-18 PROCEDURE — 99283 EMERGENCY DEPT VISIT LOW MDM: CPT

## 2025-04-18 PROCEDURE — 25010000002 ONDANSETRON PER 1 MG: Performed by: EMERGENCY MEDICINE

## 2025-04-18 PROCEDURE — 96375 TX/PRO/DX INJ NEW DRUG ADDON: CPT

## 2025-04-18 PROCEDURE — 83690 ASSAY OF LIPASE: CPT | Performed by: EMERGENCY MEDICINE

## 2025-04-18 PROCEDURE — 85025 COMPLETE CBC W/AUTO DIFF WBC: CPT | Performed by: EMERGENCY MEDICINE

## 2025-04-18 PROCEDURE — 25010000002 HYDROMORPHONE 1 MG/ML SOLUTION: Performed by: EMERGENCY MEDICINE

## 2025-04-18 RX ORDER — SODIUM CHLORIDE 0.9 % (FLUSH) 0.9 %
10 SYRINGE (ML) INJECTION AS NEEDED
Status: DISCONTINUED | OUTPATIENT
Start: 2025-04-18 | End: 2025-04-18 | Stop reason: HOSPADM

## 2025-04-18 RX ORDER — ONDANSETRON 2 MG/ML
4 INJECTION INTRAMUSCULAR; INTRAVENOUS ONCE
Status: COMPLETED | OUTPATIENT
Start: 2025-04-18 | End: 2025-04-18

## 2025-04-18 RX ORDER — HYDROCODONE BITARTRATE AND ACETAMINOPHEN 7.5; 325 MG/1; MG/1
1-2 TABLET ORAL EVERY 6 HOURS PRN
Qty: 15 TABLET | Refills: 0 | Status: SHIPPED | OUTPATIENT
Start: 2025-04-18

## 2025-04-18 RX ORDER — ONDANSETRON 4 MG/1
4 TABLET, ORALLY DISINTEGRATING ORAL EVERY 8 HOURS PRN
Qty: 12 TABLET | Refills: 0 | Status: SHIPPED | OUTPATIENT
Start: 2025-04-18

## 2025-04-18 RX ADMIN — HYDROMORPHONE HYDROCHLORIDE 0.5 MG: 1 INJECTION, SOLUTION INTRAMUSCULAR; INTRAVENOUS; SUBCUTANEOUS at 20:26

## 2025-04-18 RX ADMIN — ONDANSETRON 4 MG: 2 INJECTION, SOLUTION INTRAMUSCULAR; INTRAVENOUS at 20:26

## 2025-04-19 NOTE — ED PROVIDER NOTES
Subjective   History of Present Illness  Patient is a 49-year-old female complaining of abdominal pain that developed over the past 24 hours.  She has had nausea.  Denies cough fever Parish diarrhea dysuria or other associated complaints      Review of Systems    Past Medical History:   Diagnosis Date    Endometriosis     Fibromyalgia     Hyperlipidemia     Hypertension        Allergies   Allergen Reactions    Roxicodone [Oxycodone Hcl] Itching and Nausea Only    Toradol [Ketorolac Tromethamine] Itching and Irritability    Gabapentin Nausea And Vomiting    Naproxen Nausea And Vomiting    Pregabalin Hives    Morphine Itching       Past Surgical History:   Procedure Laterality Date    ABDOMINAL SURGERY       SECTION      CHOLECYSTECTOMY WITH INTRAOPERATIVE CHOLANGIOGRAM N/A 2022    Procedure: CHOLECYSTECTOMY LAPAROSCOPIC INTRAOPERATIVE CHOLANGIOGRAM;  Surgeon: Robert Dale MD;  Location: [facility] MAIN OR;  Service: General;  Laterality: N/A;    COLONOSCOPY N/A 3/7/2023    Procedure: COLONOSCOPY with ileum and large intestine random biopsies R/O microscopic colitis;  Surgeon: NAVIN Schneider MD;  Location: [facility] ENDOSCOPY;  Service: Gastroenterology;  Laterality: N/A;  hemorrhoids    ENDOSCOPY N/A 2022    Procedure: ESOPHAGOGASTRODUODENOSCOPY WITH BIOPSY;  Surgeon: NAVIN Schneider MD;  Location: [facility] ENDOSCOPY;  Service: Gastroenterology;  Laterality: N/A;    HYSTERECTOMY      UPPER ENDOSCOPIC ULTRASOUND W/ FNA N/A 2023    Procedure: ESOPHAGOGASTRODUODENOSCOPY WITH ENDOSCOPIC ULTRASOUND;  Surgeon: Malik King MD;  Location: [facility] ENDOSCOPY;  Service: Gastroenterology;  Laterality: N/A;  POST: CHRONIC PANCREATITIS       Family History   Problem Relation Age of Onset    Cancer Mother     Heart disease Father     Pancreatic cancer Sister        Social History     Socioeconomic History    Marital status:    Tobacco Use    Smoking status: Every Day     Current packs/day: 1.00      Average packs/day: 1 pack/day for 34.9 years (34.9 ttl pk-yrs)     Types: Cigarettes     Start date: 6/5/1990    Smokeless tobacco: Never   Vaping Use    Vaping status: Never Used   Substance and Sexual Activity    Alcohol use: Never    Drug use: Never    Sexual activity: Defer           Objective   Physical Exam  Neck has no adenopathy JVD or bruits.  Lungs clear.  Heart has regular rhythm.  Chest nontender.  Ab soft with mild epigastric and right upper quadrant tenderness.  Patient has normal bowel sounds without rebound or guarding.  Back has no tenderness.  Procedures           ED Course      Results for orders placed or performed during the hospital encounter of 04/18/25   Comprehensive Metabolic Panel    Collection Time: 04/18/25  7:12 PM    Specimen: Blood   Result Value Ref Range    Glucose 97 65 - 99 mg/dL    BUN 8 6 - 20 mg/dL    Creatinine 0.98 0.57 - 1.00 mg/dL    Sodium 138 136 - 145 mmol/L    Potassium 3.9 3.5 - 5.2 mmol/L    Chloride 105 98 - 107 mmol/L    CO2 22.5 22.0 - 29.0 mmol/L    Calcium 9.2 8.6 - 10.5 mg/dL    Total Protein 7.0 6.0 - 8.5 g/dL    Albumin 4.3 3.5 - 5.2 g/dL    ALT (SGPT) 23 1 - 33 U/L    AST (SGOT) 21 1 - 32 U/L    Alkaline Phosphatase 126 (H) 39 - 117 U/L    Total Bilirubin 0.3 0.0 - 1.2 mg/dL    Globulin 2.7 gm/dL    A/G Ratio 1.6 g/dL    BUN/Creatinine Ratio 8.2 7.0 - 25.0    Anion Gap 10.5 5.0 - 15.0 mmol/L    eGFR 71.8 >60.0 mL/min/1.73   Lipase    Collection Time: 04/18/25  7:12 PM    Specimen: Blood   Result Value Ref Range    Lipase 90 (H) 13 - 60 U/L   Urinalysis With Microscopic If Indicated (No Culture) - Urine, Clean Catch    Collection Time: 04/18/25  7:12 PM    Specimen: Urine, Clean Catch   Result Value Ref Range    Color, UA Yellow Yellow, Straw    Appearance, UA Clear Clear    pH, UA 5.5 5.0 - 8.0    Specific Gravity, UA 1.018 1.005 - 1.030    Glucose, UA Negative Negative    Ketones, UA Trace (A) Negative    Bilirubin, UA Negative Negative    Blood, UA Small  (1+) (A) Negative    Protein, UA Negative Negative    Leuk Esterase, UA Negative Negative    Nitrite, UA Negative Negative    Urobilinogen, UA 0.2 E.U./dL 0.2 - 1.0 E.U./dL   CBC Auto Differential    Collection Time: 04/18/25  7:12 PM    Specimen: Blood   Result Value Ref Range    WBC 9.42 3.40 - 10.80 10*3/mm3    RBC 4.64 3.77 - 5.28 10*6/mm3    Hemoglobin 13.3 12.0 - 15.9 g/dL    Hematocrit 42.1 34.0 - 46.6 %    MCV 90.7 79.0 - 97.0 fL    MCH 28.7 26.6 - 33.0 pg    MCHC 31.6 31.5 - 35.7 g/dL    RDW 13.2 12.3 - 15.4 %    RDW-SD 43.9 37.0 - 54.0 fl    MPV 10.5 6.0 - 12.0 fL    Platelets 255 140 - 450 10*3/mm3    Neutrophil % 52.7 42.7 - 76.0 %    Lymphocyte % 38.7 19.6 - 45.3 %    Monocyte % 6.7 5.0 - 12.0 %    Eosinophil % 1.3 0.3 - 6.2 %    Basophil % 0.3 0.0 - 1.5 %    Immature Grans % 0.3 0.0 - 0.5 %    Neutrophils, Absolute 4.96 1.70 - 7.00 10*3/mm3    Lymphocytes, Absolute 3.65 (H) 0.70 - 3.10 10*3/mm3    Monocytes, Absolute 0.63 0.10 - 0.90 10*3/mm3    Eosinophils, Absolute 0.12 0.00 - 0.40 10*3/mm3    Basophils, Absolute 0.03 0.00 - 0.20 10*3/mm3    Immature Grans, Absolute 0.03 0.00 - 0.05 10*3/mm3    nRBC 0.0 0.0 - 0.2 /100 WBC   Urinalysis, Microscopic Only - Urine, Clean Catch    Collection Time: 04/18/25  7:12 PM    Specimen: Urine, Clean Catch   Result Value Ref Range    RBC, UA 0-2 None Seen, 0-2 /HPF    WBC, UA 0-2 None Seen, 0-2 /HPF    Bacteria, UA None Seen None Seen /HPF    Squamous Epithelial Cells, UA 3-6 (A) None Seen, 0-2 /HPF    Hyaline Casts, UA None Seen None Seen /LPF    Methodology Automated Microscopy    Gold Top - [facility]    Collection Time: 04/18/25  7:12 PM   Result Value Ref Range    Extra Tube Hold for add-ons.    Light Blue Top    Collection Time: 04/18/25  7:12 PM   Result Value Ref Range    Extra Tube Hold for add-ons.      No radiology results for the last day                                                     Medical Decision Making  UA shows no evidence urinary tract infection.   CBC shows no leukocytosis no left shift and no anemia.  Metabolic panel shows no renal insufficiency or electrolyte abnormality LFTs no with no evidence hepatitis.  Lipase mildly elevated at 90.  Reviewing her old chart she generally runs between 50 and 80.  She does have a history of chronic pancreatitis.  She was given IV Dilaudid and Zofran.  She will be discharged patient will be placed on hydrocodone and Zofran.  She will see MD for recheck.    Amount and/or Complexity of Data Reviewed  Labs: ordered. Decision-making details documented in ED Course.    Risk  Prescription drug management.  Parenteral controlled substances.        Final diagnoses:   Acute pancreatitis, unspecified complication status, unspecified pancreatitis type       ED Disposition  ED Disposition       ED Disposition   Discharge    Condition   Stable    Comment   --               No follow-up provider specified.       Medication List        New Prescriptions      HYDROcodone-acetaminophen 7.5-325 MG per tablet  Commonly known as: NORCO  Take 1-2 tablets by mouth Every 6 (Six) Hours As Needed for Moderate Pain.            Changed      * ondansetron ODT 4 MG disintegrating tablet  Commonly known as: ZOFRAN-ODT  Place 1 tablet on the tongue Every 8 (Eight) Hours As Needed for Nausea or Vomiting.  What changed: Another medication with the same name was added. Make sure you understand how and when to take each.     * ondansetron ODT 4 MG disintegrating tablet  Commonly known as: ZOFRAN-ODT  Place 1 tablet on the tongue Every 8 (Eight) Hours As Needed for Vomiting.  What changed: You were already taking a medication with the same name, and this prescription was added. Make sure you understand how and when to take each.           * This list has 2 medication(s) that are the same as other medications prescribed for you. Read the directions carefully, and ask your doctor or other care provider to review them with you.                   Where to Get  Your Medications        Information about where to get these medications is not yet available    Ask your nurse or doctor about these medications  HYDROcodone-acetaminophen 7.5-325 MG per tablet  ondansetron ODT 4 MG disintegrating tablet            Zhao Delatorre MD  04/18/25 2018

## 2025-04-24 ENCOUNTER — TELEPHONE (OUTPATIENT)
Dept: FAMILY MEDICINE CLINIC | Facility: CLINIC | Age: 50
End: 2025-04-24
Payer: COMMERCIAL

## 2025-04-24 NOTE — TELEPHONE ENCOUNTER
Please call patient and make sure that she is doing better from the acute pancreatitis that caused her to go to [place] [facility].  If she is not improving she should be rechecked in our office or with gastroenterology.

## 2025-05-06 ENCOUNTER — APPOINTMENT (OUTPATIENT)
Dept: CT IMAGING | Facility: HOSPITAL | Age: 50
End: 2025-05-06
Payer: COMMERCIAL

## 2025-05-06 ENCOUNTER — HOSPITAL ENCOUNTER (EMERGENCY)
Facility: HOSPITAL | Age: 50
Discharge: HOME | End: 2025-05-06
Admitting: EMERGENCY MEDICINE
Payer: COMMERCIAL

## 2025-05-06 VITALS
TEMPERATURE: 97.9 F | OXYGEN SATURATION: 96 % | HEIGHT: 64 IN | HEART RATE: 78 BPM | BODY MASS INDEX: 35.23 KG/M2 | RESPIRATION RATE: 18 BRPM | DIASTOLIC BLOOD PRESSURE: 73 MMHG | SYSTOLIC BLOOD PRESSURE: 115 MMHG | WEIGHT: 206.35 LBS

## 2025-05-06 DIAGNOSIS — R10.13 EPIGASTRIC PAIN: Primary | ICD-10-CM

## 2025-05-06 DIAGNOSIS — R11.0 NAUSEA: ICD-10-CM

## 2025-05-06 LAB
ALBUMIN SERPL-MCNC: 4.4 G/DL (ref 3.5–5.2)
ALBUMIN/GLOB SERPL: 1.5 G/DL
ALP SERPL-CCNC: 118 U/L (ref 39–117)
ALT SERPL W P-5'-P-CCNC: 16 U/L (ref 1–33)
ANION GAP SERPL CALCULATED.3IONS-SCNC: 11.8 MMOL/L (ref 5–15)
AST SERPL-CCNC: 19 U/L (ref 1–32)
BACTERIA UR QL AUTO: NORMAL /HPF
BASOPHILS # BLD AUTO: 0.02 10*3/MM3 (ref 0–0.2)
BASOPHILS NFR BLD AUTO: 0.2 % (ref 0–1.5)
BILIRUB SERPL-MCNC: 0.3 MG/DL (ref 0–1.2)
BILIRUB UR QL STRIP: NEGATIVE
BUN SERPL-MCNC: 13 MG/DL (ref 6–20)
BUN/CREAT SERPL: 11 (ref 7–25)
CALCIUM SPEC-SCNC: 9.4 MG/DL (ref 8.6–10.5)
CHLORIDE SERPL-SCNC: 104 MMOL/L (ref 98–107)
CLARITY UR: CLEAR
CO2 SERPL-SCNC: 23.2 MMOL/L (ref 22–29)
COLOR UR: YELLOW
CREAT SERPL-MCNC: 1.18 MG/DL (ref 0.57–1)
DEPRECATED RDW RBC AUTO: 44.7 FL (ref 37–54)
EGFRCR SERPLBLD CKD-EPI 2021: 57.4 ML/MIN/1.73
EOSINOPHIL # BLD AUTO: 0.12 10*3/MM3 (ref 0–0.4)
EOSINOPHIL NFR BLD AUTO: 1.4 % (ref 0.3–6.2)
ERYTHROCYTE [DISTWIDTH] IN BLOOD BY AUTOMATED COUNT: 13.2 % (ref 12.3–15.4)
GLOBULIN UR ELPH-MCNC: 3 GM/DL
GLUCOSE SERPL-MCNC: 99 MG/DL (ref 65–99)
GLUCOSE UR STRIP-MCNC: NEGATIVE MG/DL
HCT VFR BLD AUTO: 40.9 % (ref 34–46.6)
HGB BLD-MCNC: 13 G/DL (ref 12–15.9)
HGB UR QL STRIP.AUTO: ABNORMAL
HYALINE CASTS UR QL AUTO: NORMAL /LPF
IMM GRANULOCYTES # BLD AUTO: 0.03 10*3/MM3 (ref 0–0.05)
IMM GRANULOCYTES NFR BLD AUTO: 0.3 % (ref 0–0.5)
KETONES UR QL STRIP: NEGATIVE
LEUKOCYTE ESTERASE UR QL STRIP.AUTO: NEGATIVE
LIPASE SERPL-CCNC: 59 U/L (ref 13–60)
LYMPHOCYTES # BLD AUTO: 3.45 10*3/MM3 (ref 0.7–3.1)
LYMPHOCYTES NFR BLD AUTO: 39.5 % (ref 19.6–45.3)
MCH RBC QN AUTO: 29 PG (ref 26.6–33)
MCHC RBC AUTO-ENTMCNC: 31.8 G/DL (ref 31.5–35.7)
MCV RBC AUTO: 91.3 FL (ref 79–97)
MONOCYTES # BLD AUTO: 0.61 10*3/MM3 (ref 0.1–0.9)
MONOCYTES NFR BLD AUTO: 7 % (ref 5–12)
NEUTROPHILS NFR BLD AUTO: 4.5 10*3/MM3 (ref 1.7–7)
NEUTROPHILS NFR BLD AUTO: 51.6 % (ref 42.7–76)
NITRITE UR QL STRIP: NEGATIVE
NRBC BLD AUTO-RTO: 0 /100 WBC (ref 0–0.2)
PH UR STRIP.AUTO: 6 [PH] (ref 5–8)
PLATELET # BLD AUTO: 242 10*3/MM3 (ref 140–450)
PMV BLD AUTO: 10.6 FL (ref 6–12)
POTASSIUM SERPL-SCNC: 4.2 MMOL/L (ref 3.5–5.2)
PROT SERPL-MCNC: 7.4 G/DL (ref 6–8.5)
PROT UR QL STRIP: NEGATIVE
RBC # BLD AUTO: 4.48 10*6/MM3 (ref 3.77–5.28)
RBC # UR STRIP: NORMAL /HPF
REF LAB TEST METHOD: NORMAL
SODIUM SERPL-SCNC: 139 MMOL/L (ref 136–145)
SP GR UR STRIP: 1.01 (ref 1–1.03)
SQUAMOUS #/AREA URNS HPF: NORMAL /HPF
UROBILINOGEN UR QL STRIP: ABNORMAL
WBC # UR STRIP: NORMAL /HPF
WBC NRBC COR # BLD AUTO: 8.73 10*3/MM3 (ref 3.4–10.8)

## 2025-05-06 PROCEDURE — 99285 EMERGENCY DEPT VISIT HI MDM: CPT

## 2025-05-06 PROCEDURE — 25510000001 IOPAMIDOL PER 1 ML

## 2025-05-06 PROCEDURE — 96376 TX/PRO/DX INJ SAME DRUG ADON: CPT

## 2025-05-06 PROCEDURE — 25010000002 HYDROMORPHONE 1 MG/ML SOLUTION

## 2025-05-06 PROCEDURE — 81001 URINALYSIS AUTO W/SCOPE: CPT

## 2025-05-06 PROCEDURE — 96375 TX/PRO/DX INJ NEW DRUG ADDON: CPT

## 2025-05-06 PROCEDURE — 74177 CT ABD & PELVIS W/CONTRAST: CPT

## 2025-05-06 PROCEDURE — 96374 THER/PROPH/DIAG INJ IV PUSH: CPT

## 2025-05-06 PROCEDURE — 85025 COMPLETE CBC W/AUTO DIFF WBC: CPT

## 2025-05-06 PROCEDURE — 83690 ASSAY OF LIPASE: CPT

## 2025-05-06 PROCEDURE — 25010000002 ONDANSETRON PER 1 MG

## 2025-05-06 PROCEDURE — 80053 COMPREHEN METABOLIC PANEL: CPT

## 2025-05-06 RX ORDER — ONDANSETRON 2 MG/ML
4 INJECTION INTRAMUSCULAR; INTRAVENOUS ONCE
Status: COMPLETED | OUTPATIENT
Start: 2025-05-06 | End: 2025-05-06

## 2025-05-06 RX ORDER — HYDROCODONE BITARTRATE AND ACETAMINOPHEN 7.5; 325 MG/1; MG/1
1 TABLET ORAL EVERY 6 HOURS PRN
Qty: 9 TABLET | Refills: 0 | Status: SHIPPED | OUTPATIENT
Start: 2025-05-06

## 2025-05-06 RX ORDER — IOPAMIDOL 755 MG/ML
100 INJECTION, SOLUTION INTRAVASCULAR
Status: COMPLETED | OUTPATIENT
Start: 2025-05-06 | End: 2025-05-06

## 2025-05-06 RX ADMIN — HYDROMORPHONE HYDROCHLORIDE 0.5 MG: 1 INJECTION, SOLUTION INTRAMUSCULAR; INTRAVENOUS; SUBCUTANEOUS at 23:00

## 2025-05-06 RX ADMIN — ONDANSETRON 4 MG: 2 INJECTION, SOLUTION INTRAMUSCULAR; INTRAVENOUS at 21:10

## 2025-05-06 RX ADMIN — HYDROMORPHONE HYDROCHLORIDE 0.5 MG: 1 INJECTION, SOLUTION INTRAMUSCULAR; INTRAVENOUS; SUBCUTANEOUS at 21:10

## 2025-05-06 RX ADMIN — IOPAMIDOL 100 ML: 755 INJECTION, SOLUTION INTRAVENOUS at 22:52

## 2025-05-07 NOTE — DISCHARGE INSTRUCTIONS
Take pain medication as prescribed as needed.  Consume liquid diet and increase as tolerated.  Avoid greasy or fatty foods.    Follow-up with GI for further evaluation.    Follow-up closely with PCP.    Return to the ED for any new or worsening symptoms.

## 2025-05-07 NOTE — ED PROVIDER NOTES
Subjective   History of Present Illness  Patient is a 49-year-old female with PMH of HLD, HTN, pancreatitis presenting to the ED for epigastric abdominal pain that is been ongoing the past couple days, worse this afternoon.  Patient states that an hour prior to arrival she started having worsening shooting pains in her upper abdomen radiating into her back describing it as a 9 out of 10 constant pain.  Patient has not taking any medications for her symptoms.  She also reports nausea.  She denies any fever, chills, vomiting, diarrhea, constipation, dysuria, hematuria, chest pain, or dyspnea.  Patient had her gallbladder removed 1-2 to 2 years ago, states she has been having bouts of pancreatitis ever since.        Review of Systems   Constitutional:  Negative for chills and fever.   Respiratory:  Negative for shortness of breath.    Cardiovascular:  Negative for chest pain.   Gastrointestinal:  Positive for abdominal pain and nausea. Negative for constipation, diarrhea and vomiting.   Genitourinary:  Negative for dysuria and hematuria.       Past Medical History:   Diagnosis Date    Endometriosis     Fibromyalgia     Hyperlipidemia     Hypertension        Allergies   Allergen Reactions    Roxicodone [Oxycodone Hcl] Itching and Nausea Only    Toradol [Ketorolac Tromethamine] Itching and Irritability    Gabapentin Nausea And Vomiting    Naproxen Nausea And Vomiting    Pregabalin Hives    Morphine Itching       Past Surgical History:   Procedure Laterality Date    ABDOMINAL SURGERY       SECTION      CHOLECYSTECTOMY WITH INTRAOPERATIVE CHOLANGIOGRAM N/A 2022    Procedure: CHOLECYSTECTOMY LAPAROSCOPIC INTRAOPERATIVE CHOLANGIOGRAM;  Surgeon: Robert Dale MD;  Location: [facility] MAIN OR;  Service: General;  Laterality: N/A;    COLONOSCOPY N/A 3/7/2023    Procedure: COLONOSCOPY with ileum and large intestine random biopsies R/O microscopic colitis;  Surgeon: NAVIN Schneider MD;  Location: [facility] ENDOSCOPY;   Service: Gastroenterology;  Laterality: N/A;  hemorrhoids    ENDOSCOPY N/A 5/30/2022    Procedure: ESOPHAGOGASTRODUODENOSCOPY WITH BIOPSY;  Surgeon: NAVIN Schneider MD;  Location: [facility] ENDOSCOPY;  Service: Gastroenterology;  Laterality: N/A;    HYSTERECTOMY      UPPER ENDOSCOPIC ULTRASOUND W/ FNA N/A 12/4/2023    Procedure: ESOPHAGOGASTRODUODENOSCOPY WITH ENDOSCOPIC ULTRASOUND;  Surgeon: Malik King MD;  Location: [facility] ENDOSCOPY;  Service: Gastroenterology;  Laterality: N/A;  POST: CHRONIC PANCREATITIS       Family History   Problem Relation Age of Onset    Cancer Mother     Heart disease Father     Pancreatic cancer Sister        Social History     Socioeconomic History    Marital status:    Tobacco Use    Smoking status: Every Day     Current packs/day: 1.00     Average packs/day: 1 pack/day for 34.9 years (34.9 ttl pk-yrs)     Types: Cigarettes     Start date: 6/5/1990    Smokeless tobacco: Never   Vaping Use    Vaping status: Never Used   Substance and Sexual Activity    Alcohol use: Never    Drug use: Never    Sexual activity: Defer           Objective   Physical Exam  Constitutional:       Appearance: Normal appearance. She is well-developed.   HENT:      Head: Normocephalic and atraumatic.      Mouth/Throat:      Mouth: Mucous membranes are moist.   Eyes:      Extraocular Movements: Extraocular movements intact.   Cardiovascular:      Rate and Rhythm: Normal rate and regular rhythm.      Pulses: Normal pulses.      Heart sounds: Normal heart sounds.   Pulmonary:      Effort: Pulmonary effort is normal.      Breath sounds: Normal breath sounds.   Abdominal:      General: Abdomen is flat. Bowel sounds are normal.      Palpations: Abdomen is soft.      Tenderness: There is abdominal tenderness in the right upper quadrant, epigastric area and left upper quadrant.   Musculoskeletal:         General: Normal range of motion.      Cervical back: Normal range of motion.      Right lower leg: No edema.  "     Left lower leg: No edema.   Skin:     General: Skin is warm and dry.      Capillary Refill: Capillary refill takes less than 2 seconds.   Neurological:      General: No focal deficit present.      Mental Status: She is alert and oriented to person, place, and time.   Psychiatric:         Mood and Affect: Mood normal.         Behavior: Behavior normal.         Procedures           ED Course      /73   Pulse 78   Temp 97.9 °F (36.6 °C) (Oral)   Resp 18   Ht 162.6 cm (64\")   Wt 93.6 kg (206 lb 5.6 oz)   SpO2 96%   BMI 35.42 kg/m²   Labs Reviewed   COMPREHENSIVE METABOLIC PANEL - Abnormal; Notable for the following components:       Result Value    Creatinine 1.18 (*)     Alkaline Phosphatase 118 (*)     eGFR 57.4 (*)     All other components within normal limits    Narrative:     GFR Categories in Chronic Kidney Disease (CKD)              GFR Category          GFR (mL/min/1.73)    Interpretation  G1                    90 or greater        Normal or high (1)  G2                    60-89                Mild decrease (1)  G3a                   45-59                Mild to moderate decrease  G3b                   30-44                Moderate to severe decrease  G4                    15-29                Severe decrease  G5                    14 or less           Kidney failure    (1)In the absence of evidence of kidney disease, neither GFR category G1 or G2 fulfill the criteria for CKD.    eGFR calculation 2021 CKD-EPI creatinine equation, which does not include race as a factor   URINALYSIS W/ CULTURE IF INDICATED - Abnormal; Notable for the following components:    Blood, UA Trace (*)     All other components within normal limits    Narrative:     In absence of clinical symptoms, the presence of pyuria, bacteria, and/or nitrites on the urinalysis result does not correlate with infection.   CBC WITH AUTO DIFFERENTIAL - Abnormal; Notable for the following components:    Lymphocytes, Absolute 3.45 (*)     " All other components within normal limits   LIPASE - Normal   URINALYSIS, MICROSCOPIC ONLY   CBC AND DIFFERENTIAL    Narrative:     The following orders were created for panel order CBC & Differential.  Procedure                               Abnormality         Status                     ---------                               -----------         ------                     CBC Auto Differential[[id number]]        Abnormal            Final result                 Please view results for these tests on the individual orders.     Medications   HYDROmorphone (DILAUDID) injection 0.5 mg (0.5 mg Intravenous Given 5/6/25 2110)   ondansetron (ZOFRAN) injection 4 mg (4 mg Intravenous Given 5/6/25 2110)   HYDROmorphone (DILAUDID) injection 0.5 mg (0.5 mg Intravenous Given 5/6/25 2300)   iopamidol (ISOVUE-370) 76 % injection 100 mL (100 mL Intravenous Given 5/6/25 2252)     CT Abdomen Pelvis With Contrast  Result Date: 5/6/2025  Impression: 1.No acute findings in the abdomen or pelvis. 2.Chronic and incidental findings as above. Electronically Signed: Jd Coronel MD  5/6/2025 11:00 PM EDT  Workstation ID: [id number]                                                     Medical Decision Making  Patient presented to the ED for the above complaint.    Patient underwent the above exam and evaluation.    While in the ED patient was placed in gown and IV was established.  Blood work was obtained to assess for acute pancreatitis, electro abnormality, dehydration, infection.  Patient was given IV pain medication and Zofran.  Upon reevaluation patient reporting continuing pain, still significantly tender to palpation, discussed imaging at this time, patient requesting imaging.  At this time imaging was ordered and patient was given more pain medication.  Upon reevaluation patient reports improvement in pain.  Discussed findings with patient family at bedside.  Offered placement in observation for continued symptomatic treatment and GI  evaluation, patient declined, preferring to follow-up outpatient.  She will be discharged here with short course of pain medication.  Inspect reviewed showing patient last picked up a prescription 4/19/25 for 3 days of Norco 7.5.  Educated patient to take pain medication as prescribed as needed, use Zofran as previously prescribed, consume liquid diet and increase as tolerated, follow closely with GI, follow-up closely with PCP, strict return precautions were discussed.  Patient voiced understanding, agreeable with dispo plan.    Labs were independently interpreted by myself and deemed remarkable for the following: No leukocytosis, no signs of anemia, creatinine 1.18, no electrolyte imbalance, lipase 59, urinalysis negative for hematuria or bacteria.  CT abdomen pelvis independently interpreted by radiologist and reviewed by myself showing: No findings of acute intra-abdominal infection, perforation, or abscess.    Appropriate PPE was worn during each patient encounter.    Note Disclaimer: At [facility], we believe that sharing information builds trust and better relationships. You are receiving this note because you are receiving care at [facility] or recently visited. It is possible you will see health information before a provider has talked with you about it. This kind of information can be easy to misunderstand. To help you fully understand what it means for your health, we urge you to discuss this note with your provider.        Problems Addressed:  Epigastric pain: complicated acute illness or injury  Nausea: complicated acute illness or injury    Amount and/or Complexity of Data Reviewed  Labs: ordered.  Radiology: ordered.    Risk  Prescription drug management.        Final diagnoses:   Epigastric pain   Nausea       ED Disposition  ED Disposition       ED Disposition   Discharge    Condition   Stable    Comment   --               Anita Blanco MD  [address] [place] [address] [place]  [address] [phone]          Malik King MD  [address] [facility] [place] [address] [phone]    Schedule an appointment as soon as possible for a visit            Medication List        Changed      HYDROcodone-acetaminophen 7.5-325 MG per tablet  Commonly known as: NORCO  Take 1 tablet by mouth Every 6 (Six) Hours As Needed for Moderate Pain.  What changed: how much to take               Where to Get Your Medications        These medications were sent to [facility] - [place], [place] - [address] - [phone] [place] [phone]  [address]  [place] [address]      Phone: [phone]   HYDROcodone-acetaminophen 7.5-325 MG per tablet            Robyn Moore PA-C  05/07/25 0026

## 2025-05-07 NOTE — ED TRIAGE NOTES
Pt c/o upper abd pain that is radiating to her back that started a couple days and got a lot worse this afternoon. Pt has hx of pancreatitis and this feels very similar

## 2025-05-24 ENCOUNTER — HOSPITAL ENCOUNTER (EMERGENCY)
Facility: HOSPITAL | Age: 50
Discharge: HOME | End: 2025-05-24
Payer: COMMERCIAL

## 2025-05-24 ENCOUNTER — APPOINTMENT (OUTPATIENT)
Dept: CT IMAGING | Facility: HOSPITAL | Age: 50
End: 2025-05-24
Payer: COMMERCIAL

## 2025-05-24 VITALS
HEIGHT: 64 IN | TEMPERATURE: 97.7 F | WEIGHT: 206.79 LBS | OXYGEN SATURATION: 92 % | BODY MASS INDEX: 35.3 KG/M2 | SYSTOLIC BLOOD PRESSURE: 109 MMHG | RESPIRATION RATE: 17 BRPM | HEART RATE: 72 BPM | DIASTOLIC BLOOD PRESSURE: 56 MMHG

## 2025-05-24 DIAGNOSIS — R10.10 UPPER ABDOMINAL PAIN: Primary | ICD-10-CM

## 2025-05-24 LAB
ALBUMIN SERPL-MCNC: 4.4 G/DL (ref 3.5–5.2)
ALBUMIN/GLOB SERPL: 2 G/DL
ALP SERPL-CCNC: 115 U/L (ref 39–117)
ALT SERPL W P-5'-P-CCNC: 21 U/L (ref 1–33)
ANION GAP SERPL CALCULATED.3IONS-SCNC: 18.4 MMOL/L (ref 5–15)
AST SERPL-CCNC: 26 U/L (ref 1–32)
BACTERIA UR QL AUTO: ABNORMAL /HPF
BASOPHILS # BLD AUTO: 0.03 10*3/MM3 (ref 0–0.2)
BASOPHILS NFR BLD AUTO: 0.3 % (ref 0–1.5)
BILIRUB SERPL-MCNC: 0.3 MG/DL (ref 0–1.2)
BILIRUB UR QL STRIP: NEGATIVE
BUN SERPL-MCNC: 12 MG/DL (ref 6–20)
BUN/CREAT SERPL: 11.7 (ref 7–25)
CALCIUM SPEC-SCNC: 9.2 MG/DL (ref 8.6–10.5)
CHLORIDE SERPL-SCNC: 102 MMOL/L (ref 98–107)
CLARITY UR: CLEAR
CO2 SERPL-SCNC: 21.6 MMOL/L (ref 22–29)
COLOR UR: YELLOW
CREAT SERPL-MCNC: 1.03 MG/DL (ref 0.57–1)
D DIMER PPP FEU-MCNC: 0.3 MCGFEU/ML (ref 0–0.5)
DEPRECATED RDW RBC AUTO: 44.3 FL (ref 37–54)
EGFRCR SERPLBLD CKD-EPI 2021: 67.6 ML/MIN/1.73
EOSINOPHIL # BLD AUTO: 0.14 10*3/MM3 (ref 0–0.4)
EOSINOPHIL NFR BLD AUTO: 1.5 % (ref 0.3–6.2)
ERYTHROCYTE [DISTWIDTH] IN BLOOD BY AUTOMATED COUNT: 13 % (ref 12.3–15.4)
GLOBULIN UR ELPH-MCNC: 2.2 GM/DL
GLUCOSE SERPL-MCNC: 98 MG/DL (ref 65–99)
GLUCOSE UR STRIP-MCNC: NEGATIVE MG/DL
HCT VFR BLD AUTO: 40.6 % (ref 34–46.6)
HGB BLD-MCNC: 13.2 G/DL (ref 12–15.9)
HGB UR QL STRIP.AUTO: ABNORMAL
HOLD SPECIMEN: NORMAL
HOLD SPECIMEN: NORMAL
HYALINE CASTS UR QL AUTO: ABNORMAL /LPF
IMM GRANULOCYTES # BLD AUTO: 0.04 10*3/MM3 (ref 0–0.05)
IMM GRANULOCYTES NFR BLD AUTO: 0.4 % (ref 0–0.5)
KETONES UR QL STRIP: ABNORMAL
LEUKOCYTE ESTERASE UR QL STRIP.AUTO: NEGATIVE
LIPASE SERPL-CCNC: 103 U/L (ref 13–60)
LYMPHOCYTES # BLD AUTO: 3.41 10*3/MM3 (ref 0.7–3.1)
LYMPHOCYTES NFR BLD AUTO: 37.2 % (ref 19.6–45.3)
MCH RBC QN AUTO: 30.1 PG (ref 26.6–33)
MCHC RBC AUTO-ENTMCNC: 32.5 G/DL (ref 31.5–35.7)
MCV RBC AUTO: 92.5 FL (ref 79–97)
MONOCYTES # BLD AUTO: 0.56 10*3/MM3 (ref 0.1–0.9)
MONOCYTES NFR BLD AUTO: 6.1 % (ref 5–12)
NEUTROPHILS NFR BLD AUTO: 4.99 10*3/MM3 (ref 1.7–7)
NEUTROPHILS NFR BLD AUTO: 54.5 % (ref 42.7–76)
NITRITE UR QL STRIP: NEGATIVE
NRBC BLD AUTO-RTO: 0 /100 WBC (ref 0–0.2)
PH UR STRIP.AUTO: 5.5 [PH] (ref 5–8)
PLATELET # BLD AUTO: 246 10*3/MM3 (ref 140–450)
PMV BLD AUTO: 11 FL (ref 6–12)
POTASSIUM SERPL-SCNC: 4.2 MMOL/L (ref 3.5–5.2)
PROT SERPL-MCNC: 6.6 G/DL (ref 6–8.5)
PROT UR QL STRIP: NEGATIVE
RBC # BLD AUTO: 4.39 10*6/MM3 (ref 3.77–5.28)
RBC # UR STRIP: ABNORMAL /HPF
REF LAB TEST METHOD: ABNORMAL
SODIUM SERPL-SCNC: 142 MMOL/L (ref 136–145)
SP GR UR STRIP: 1.02 (ref 1–1.03)
SQUAMOUS #/AREA URNS HPF: ABNORMAL /HPF
UROBILINOGEN UR QL STRIP: ABNORMAL
WBC # UR STRIP: ABNORMAL /HPF
WBC NRBC COR # BLD AUTO: 9.17 10*3/MM3 (ref 3.4–10.8)
WHOLE BLOOD HOLD COAG: NORMAL
WHOLE BLOOD HOLD SPECIMEN: NORMAL

## 2025-05-24 PROCEDURE — 36415 COLL VENOUS BLD VENIPUNCTURE: CPT

## 2025-05-24 PROCEDURE — 96374 THER/PROPH/DIAG INJ IV PUSH: CPT

## 2025-05-24 PROCEDURE — 25010000002 ONDANSETRON PER 1 MG: Performed by: NURSE PRACTITIONER

## 2025-05-24 PROCEDURE — 25010000002 HYDROMORPHONE 1 MG/ML SOLUTION: Performed by: NURSE PRACTITIONER

## 2025-05-24 PROCEDURE — 74177 CT ABD & PELVIS W/CONTRAST: CPT

## 2025-05-24 PROCEDURE — 96375 TX/PRO/DX INJ NEW DRUG ADDON: CPT

## 2025-05-24 PROCEDURE — 25510000001 IOPAMIDOL PER 1 ML: Performed by: NURSE PRACTITIONER

## 2025-05-24 PROCEDURE — 81001 URINALYSIS AUTO W/SCOPE: CPT | Performed by: NURSE PRACTITIONER

## 2025-05-24 PROCEDURE — 85379 FIBRIN DEGRADATION QUANT: CPT | Performed by: NURSE PRACTITIONER

## 2025-05-24 PROCEDURE — 83690 ASSAY OF LIPASE: CPT | Performed by: NURSE PRACTITIONER

## 2025-05-24 PROCEDURE — 80053 COMPREHEN METABOLIC PANEL: CPT | Performed by: NURSE PRACTITIONER

## 2025-05-24 PROCEDURE — 85025 COMPLETE CBC W/AUTO DIFF WBC: CPT | Performed by: NURSE PRACTITIONER

## 2025-05-24 PROCEDURE — 25010000002 PROCHLORPERAZINE 10 MG/2ML SOLUTION: Performed by: NURSE PRACTITIONER

## 2025-05-24 PROCEDURE — 99285 EMERGENCY DEPT VISIT HI MDM: CPT

## 2025-05-24 PROCEDURE — 25010000002 FAMOTIDINE 10 MG/ML SOLUTION: Performed by: NURSE PRACTITIONER

## 2025-05-24 RX ORDER — SUCRALFATE 1 G/1
1 TABLET ORAL 4 TIMES DAILY
Qty: 12 TABLET | Refills: 0 | Status: SHIPPED | OUTPATIENT
Start: 2025-05-24

## 2025-05-24 RX ORDER — SODIUM CHLORIDE 0.9 % (FLUSH) 0.9 %
10 SYRINGE (ML) INJECTION AS NEEDED
Status: DISCONTINUED | OUTPATIENT
Start: 2025-05-24 | End: 2025-05-25 | Stop reason: HOSPADM

## 2025-05-24 RX ORDER — HYDROCODONE BITARTRATE AND ACETAMINOPHEN 5; 325 MG/1; MG/1
1 TABLET ORAL EVERY 6 HOURS PRN
Qty: 8 TABLET | Refills: 0 | Status: SHIPPED | OUTPATIENT
Start: 2025-05-24

## 2025-05-24 RX ORDER — ONDANSETRON 2 MG/ML
4 INJECTION INTRAMUSCULAR; INTRAVENOUS ONCE
Status: COMPLETED | OUTPATIENT
Start: 2025-05-24 | End: 2025-05-24

## 2025-05-24 RX ORDER — IOPAMIDOL 755 MG/ML
100 INJECTION, SOLUTION INTRAVASCULAR
Status: COMPLETED | OUTPATIENT
Start: 2025-05-24 | End: 2025-05-24

## 2025-05-24 RX ORDER — ONDANSETRON 4 MG/1
4 TABLET, ORALLY DISINTEGRATING ORAL EVERY 8 HOURS PRN
Qty: 8 TABLET | Refills: 0 | Status: SHIPPED | OUTPATIENT
Start: 2025-05-24 | End: 2025-06-01

## 2025-05-24 RX ORDER — FAMOTIDINE 10 MG/ML
20 INJECTION, SOLUTION INTRAVENOUS ONCE
Status: COMPLETED | OUTPATIENT
Start: 2025-05-24 | End: 2025-05-24

## 2025-05-24 RX ORDER — PROCHLORPERAZINE EDISYLATE 5 MG/ML
10 INJECTION INTRAMUSCULAR; INTRAVENOUS ONCE
Status: COMPLETED | OUTPATIENT
Start: 2025-05-24 | End: 2025-05-24

## 2025-05-24 RX ADMIN — FAMOTIDINE 20 MG: 10 INJECTION INTRAVENOUS at 19:31

## 2025-05-24 RX ADMIN — IOPAMIDOL 100 ML: 755 INJECTION, SOLUTION INTRAVENOUS at 21:09

## 2025-05-24 RX ADMIN — ONDANSETRON 4 MG: 2 INJECTION, SOLUTION INTRAMUSCULAR; INTRAVENOUS at 19:32

## 2025-05-24 RX ADMIN — HYDROMORPHONE HYDROCHLORIDE 1 MG: 1 INJECTION, SOLUTION INTRAMUSCULAR; INTRAVENOUS; SUBCUTANEOUS at 21:14

## 2025-05-24 RX ADMIN — PROCHLORPERAZINE EDISYLATE 10 MG: 5 INJECTION INTRAMUSCULAR; INTRAVENOUS at 20:49

## 2025-05-25 ENCOUNTER — TELEPHONE (OUTPATIENT)
Dept: FAMILY MEDICINE CLINIC | Facility: CLINIC | Age: 50
End: 2025-05-25
Payer: COMMERCIAL

## 2025-05-25 NOTE — DISCHARGE INSTRUCTIONS
Take medication as prescribed.  Continue current home medications.  Drink plenty of fluids.  Follow-up with your gastroenterologist and primary care provider.  Return for new or worsening symptoms.

## 2025-05-25 NOTE — ED PROVIDER NOTES
Subjective   History of Present Illness  Patient is a 49-year-old white female with history of chronic pancreatitis presents to the ED today with complaints of upper abdominal pain epigastric pain radiating into the right upper quadrant and right lower chest.  She states it feels similar to prior peritonitis flareups.  She has been trying treat at home with clear liquids and Tylenol with no relief.  She reports no nausea, no vomiting.  No fever or chills.  No shortness of breath.      Review of Systems   Constitutional:  Negative for fever.   Gastrointestinal:  Positive for abdominal pain and nausea.       Past Medical History:   Diagnosis Date    Endometriosis     Fibromyalgia     Hyperlipidemia     Hypertension        Allergies   Allergen Reactions    Roxicodone [Oxycodone Hcl] Itching and Nausea Only    Toradol [Ketorolac Tromethamine] Itching and Irritability    Gabapentin Nausea And Vomiting    Naproxen Nausea And Vomiting    Pregabalin Hives    Morphine Itching       Past Surgical History:   Procedure Laterality Date    ABDOMINAL SURGERY       SECTION      CHOLECYSTECTOMY WITH INTRAOPERATIVE CHOLANGIOGRAM N/A 2022    Procedure: CHOLECYSTECTOMY LAPAROSCOPIC INTRAOPERATIVE CHOLANGIOGRAM;  Surgeon: Robert Dale MD;  Location: [facility] MAIN OR;  Service: General;  Laterality: N/A;    COLONOSCOPY N/A 3/7/2023    Procedure: COLONOSCOPY with ileum and large intestine random biopsies R/O microscopic colitis;  Surgeon: NAVIN Schneider MD;  Location: [facility] ENDOSCOPY;  Service: Gastroenterology;  Laterality: N/A;  hemorrhoids    ENDOSCOPY N/A 2022    Procedure: ESOPHAGOGASTRODUODENOSCOPY WITH BIOPSY;  Surgeon: NAVIN Schneider MD;  Location: [facility] ENDOSCOPY;  Service: Gastroenterology;  Laterality: N/A;    HYSTERECTOMY      UPPER ENDOSCOPIC ULTRASOUND W/ FNA N/A 2023    Procedure: ESOPHAGOGASTRODUODENOSCOPY WITH ENDOSCOPIC ULTRASOUND;  Surgeon: Malik King MD;  Location: [facility] ENDOSCOPY;   Service: Gastroenterology;  Laterality: N/A;  POST: CHRONIC PANCREATITIS       Family History   Problem Relation Age of Onset    Cancer Mother     Heart disease Father     Pancreatic cancer Sister        Social History     Socioeconomic History    Marital status:    Tobacco Use    Smoking status: Every Day     Current packs/day: 1.00     Average packs/day: 1 pack/day for 35.0 years (35.0 ttl pk-yrs)     Types: Cigarettes     Start date: 6/5/1990    Smokeless tobacco: Never   Vaping Use    Vaping status: Never Used   Substance and Sexual Activity    Alcohol use: Never    Drug use: Never    Sexual activity: Defer           Objective   Physical Exam  Vital signs and triage nurse note reviewed.  Constitutional: Awake, alert; well-developed and well-nourished. No acute distress is noted.  HEENT: Normocephalic, atraumatic; pupils are PERRL with intact EOM; oropharynx is pink and moist without exudate or erythema.  No drooling or pooling of oral secretions.  Neck: Supple  Cardiovascular: Regular rate and rhythm, normal S1-S2.  No murmur noted.  Pulmonary: Respiratory effort regular nonlabored, breath sounds clear to auscultation all fields.  Abdomen: Soft, tender over the upper abdomen, nondistended with normoactive bowel sounds; no rebound or guarding.  Musculoskeletal: Independent range of motion of all extremities with no palpable tenderness or edema.   Skin: Flesh tone, warm, dry, intact; no erythematous or petechial rash or lesion.    Procedures           ED Course      Labs Reviewed   COMPREHENSIVE METABOLIC PANEL - Abnormal; Notable for the following components:       Result Value    Creatinine 1.03 (*)     CO2 21.6 (*)     Anion Gap 18.4 (*)     All other components within normal limits    Narrative:     GFR Categories in Chronic Kidney Disease (CKD)              GFR Category          GFR (mL/min/1.73)    Interpretation  G1                    90 or greater        Normal or high (1)  G2                     60-89                Mild decrease (1)  G3a                   45-59                Mild to moderate decrease  G3b                   30-44                Moderate to severe decrease  G4                    15-29                Severe decrease  G5                    14 or less           Kidney failure    (1)In the absence of evidence of kidney disease, neither GFR category G1 or G2 fulfill the criteria for CKD.    eGFR calculation 2021 CKD-EPI creatinine equation, which does not include race as a factor   LIPASE - Abnormal; Notable for the following components:    Lipase 103 (*)     All other components within normal limits   URINALYSIS W/ MICROSCOPIC IF INDICATED (NO CULTURE) - Abnormal; Notable for the following components:    Ketones, UA Trace (*)     Blood, UA Small (1+) (*)     All other components within normal limits   CBC WITH AUTO DIFFERENTIAL - Abnormal; Notable for the following components:    Lymphocytes, Absolute 3.41 (*)     All other components within normal limits   URINALYSIS, MICROSCOPIC ONLY - Abnormal; Notable for the following components:    Bacteria, UA Trace (*)     Squamous Epithelial Cells, UA 3-6 (*)     All other components within normal limits   D-DIMER, QUANTITATIVE - Normal    Narrative:     According to the assay 's published package insert, a normal (<0.50 MCGFEU/mL) D-dimer result in conjunction with a non-high clinical probability assessment, excludes deep vein thrombosis (DVT) and pulmonary embolism (PE) with high sensitivity.    D-dimer values increase with age and this can make VTE exclusion of an older population difficult. To address this, the [employer] College [employer], based on best available evidence and recent guidelines, recommends that clinicians use age-adjusted D-dimer thresholds in patients greater than 50 years of age with: a) a low probability of PE who do not meet all Pulmonary Embolism Rule Out Criteria, or b) in those with intermediate probability of  "PE.   The formula for an age-adjusted D-dimer cut-off is \"age/100\".  For example, a 60 year old patient would have an age-adjusted cut-off of 0.60 MCGFEU/mL and an 80 year old 0.80 MCGFEU/mL.   RAINBOW DRAW    Narrative:     The following orders were created for panel order [place] Draw.  Procedure                               Abnormality         Status                     ---------                               -----------         ------                     Green Top (Gel)[[id number]]                                  Final result               Lavender Top[[id number]]                                     Final result               Gold Top - SST[[id number]]                                   Final result               Light Blue Top[[id number]]                                   Final result                 Please view results for these tests on the individual orders.   GREEN TOP   LAVENDER TOP   GOLD TOP - SST   LIGHT BLUE TOP   CBC AND DIFFERENTIAL    Narrative:     The following orders were created for panel order CBC & Differential.  Procedure                               Abnormality         Status                     ---------                               -----------         ------                     CBC Auto Differential[[id number]]        Abnormal            Final result                 Please view results for these tests on the individual orders.     CT Abdomen Pelvis With Contrast  Result Date: 5/24/2025  Impression: 1.No acute intra-abdominal or intrapelvic process. 2.Significant hepatic steatosis. 3.Ancillary findings as described above. Electronically Signed: Farhana Tan MD  5/24/2025 9:28 PM EDT  Workstation ID: [id number]    Medications   sodium chloride 0.9 % flush 10 mL (has no administration in time range)   famotidine (PEPCID) injection 20 mg (20 mg Intravenous Given 5/24/25 1931)   ondansetron (ZOFRAN) injection 4 mg (4 mg Intravenous Given 5/24/25 1932)   prochlorperazine (COMPAZINE) injection 10 mg " "(10 mg Intravenous Given 5/24/25 2049)   HYDROmorphone (DILAUDID) injection 1 mg (1 mg Intravenous Given 5/24/25 2114)   iopamidol (ISOVUE-370) 76 % injection 100 mL (100 mL Intravenous Given 5/24/25 2109)                                                      Medical Decision Making  Patient presents today with the above complaint.    She had above exam and evaluation.  IV was established.  Labs and CT were obtained.  She was given IV Pepcid and Zofran initially with no relief in her pain.  She was given a dose of IV Compazine but states \"that never helps my pain.\"  She was subsequently given a dose of Dilaudid.    Workup: CBC is unremarkable.  Metabolic panel is grossly unremarkable.  Lipase 103.  INR within normal limits at 0.30.  Urinalysis shows trace ketones, small blood, trace bacteria.  CT of the abdomen pelvis was interpreted by radiologist and reviewed by myself and shows no acute findings, hepatic steatosis.    On reexamination patient is resting quietly no distress.  She has no new complaints.  She is well-appearing and hemodynamically stable.  She is afebrile.  Findings were discussed with her.  We discussed placement in observation for continued treatment versus discharge, patient states she would like to go home.  She will be discharged home with prescriptions for Norco, Carafate and Zofran instructed follow-up with her gastroenterologist and primary care provider.  She was given return precautions and voiced understanding.    Problems Addressed:  Upper abdominal pain: complicated acute illness or injury    Amount and/or Complexity of Data Reviewed  Labs: ordered.  Radiology: ordered.    Risk  Prescription drug management.        Final diagnoses:   Upper abdominal pain       ED Disposition  ED Disposition       ED Disposition   Discharge    Condition   Stable    Comment   --               [facility]  [address] [facility] [address] [phone]  Schedule an " appointment as soon as possible for a visit       Anita Blanco MD  [address] [facility] [place] [address] [phone]    Schedule an appointment as soon as possible for a visit            Medication List        New Prescriptions      HYDROcodone-acetaminophen 5-325 MG per tablet  Commonly known as: NORCO  Take 1 tablet by mouth Every 6 (Six) Hours As Needed for Moderate Pain or Severe Pain.  Replaces: HYDROcodone-acetaminophen 7.5-325 MG per tablet     sucralfate 1 g tablet  Commonly known as: CARAFATE  Take 1 tablet by mouth 4 (Four) Times a Day.            Changed      ondansetron ODT 4 MG disintegrating tablet  Commonly known as: ZOFRAN-ODT  Place 1 tablet on the tongue Every 8 (Eight) Hours As Needed for Nausea.  What changed:   reasons to take this  Another medication with the same name was removed. Continue taking this medication, and follow the directions you see here.            Stop      HYDROcodone-acetaminophen 7.5-325 MG per tablet  Commonly known as: NORCO  Replaced by: HYDROcodone-acetaminophen 5-325 MG per tablet     oxyCODONE 5 MG immediate release tablet  Commonly known as: Roxicodone               Where to Get Your Medications        These medications were sent to [facility] - [place] - [address]   - [phone]  - [phone]   [address]  [place] [address]      Phone: [phone]   HYDROcodone-acetaminophen 5-325 MG per tablet  ondansetron ODT 4 MG disintegrating tablet  sucralfate 1 g tablet            Jane Portillo APRN  05/24/25 4305

## 2025-05-25 NOTE — TELEPHONE ENCOUNTER
Please call patient and make sure that she is doing better from the abdominal pain that caused her to go to [facility] we should recheck her if it persists.

## 2025-06-01 ENCOUNTER — APPOINTMENT (OUTPATIENT)
Dept: ULTRASOUND IMAGING | Facility: HOSPITAL | Age: 50
End: 2025-06-01
Payer: COMMERCIAL

## 2025-06-01 ENCOUNTER — HOSPITAL ENCOUNTER (EMERGENCY)
Facility: HOSPITAL | Age: 50
Discharge: HOME | End: 2025-06-01
Admitting: EMERGENCY MEDICINE
Payer: COMMERCIAL

## 2025-06-01 VITALS
DIASTOLIC BLOOD PRESSURE: 70 MMHG | HEART RATE: 65 BPM | HEIGHT: 64 IN | TEMPERATURE: 98.2 F | BODY MASS INDEX: 35.49 KG/M2 | WEIGHT: 207.89 LBS | OXYGEN SATURATION: 97 % | SYSTOLIC BLOOD PRESSURE: 110 MMHG | RESPIRATION RATE: 18 BRPM

## 2025-06-01 DIAGNOSIS — R10.10 PAIN OF UPPER ABDOMEN: Primary | ICD-10-CM

## 2025-06-01 PROBLEM — Z87.19 HISTORY OF ACUTE PANCREATITIS: Status: ACTIVE | Noted: 2022-04-24

## 2025-06-01 LAB
ALBUMIN SERPL-MCNC: 4.5 G/DL (ref 3.5–5.2)
ALBUMIN/GLOB SERPL: 1.7 G/DL
ALP SERPL-CCNC: 111 U/L (ref 39–117)
ALT SERPL W P-5'-P-CCNC: 21 U/L (ref 1–33)
ANION GAP SERPL CALCULATED.3IONS-SCNC: 11.6 MMOL/L (ref 5–15)
AST SERPL-CCNC: 22 U/L (ref 1–32)
BACTERIA UR QL AUTO: NORMAL /HPF
BASOPHILS # BLD AUTO: 0.02 10*3/MM3 (ref 0–0.2)
BASOPHILS NFR BLD AUTO: 0.3 % (ref 0–1.5)
BILIRUB SERPL-MCNC: 0.5 MG/DL (ref 0–1.2)
BILIRUB UR QL STRIP: NEGATIVE
BUN SERPL-MCNC: 8.9 MG/DL (ref 6–20)
BUN/CREAT SERPL: 10 (ref 7–25)
CALCIUM SPEC-SCNC: 9.4 MG/DL (ref 8.6–10.5)
CHLORIDE SERPL-SCNC: 103 MMOL/L (ref 98–107)
CLARITY UR: CLEAR
CO2 SERPL-SCNC: 23.4 MMOL/L (ref 22–29)
COLOR UR: YELLOW
CREAT SERPL-MCNC: 0.89 MG/DL (ref 0.57–1)
DEPRECATED RDW RBC AUTO: 44.7 FL (ref 37–54)
EGFRCR SERPLBLD CKD-EPI 2021: 80.6 ML/MIN/1.73
EOSINOPHIL # BLD AUTO: 0.13 10*3/MM3 (ref 0–0.4)
EOSINOPHIL NFR BLD AUTO: 1.7 % (ref 0.3–6.2)
ERYTHROCYTE [DISTWIDTH] IN BLOOD BY AUTOMATED COUNT: 13.2 % (ref 12.3–15.4)
GLOBULIN UR ELPH-MCNC: 2.6 GM/DL
GLUCOSE SERPL-MCNC: 91 MG/DL (ref 65–99)
GLUCOSE UR STRIP-MCNC: NEGATIVE MG/DL
HCT VFR BLD AUTO: 41.6 % (ref 34–46.6)
HGB BLD-MCNC: 13.1 G/DL (ref 12–15.9)
HGB UR QL STRIP.AUTO: ABNORMAL
HOLD SPECIMEN: NORMAL
HYALINE CASTS UR QL AUTO: NORMAL /LPF
IMM GRANULOCYTES # BLD AUTO: 0.02 10*3/MM3 (ref 0–0.05)
IMM GRANULOCYTES NFR BLD AUTO: 0.3 % (ref 0–0.5)
KETONES UR QL STRIP: NEGATIVE
LEUKOCYTE ESTERASE UR QL STRIP.AUTO: NEGATIVE
LIPASE SERPL-CCNC: 50 U/L (ref 13–60)
LYMPHOCYTES # BLD AUTO: 2.71 10*3/MM3 (ref 0.7–3.1)
LYMPHOCYTES NFR BLD AUTO: 35.1 % (ref 19.6–45.3)
MAGNESIUM SERPL-MCNC: 2.2 MG/DL (ref 1.6–2.6)
MCH RBC QN AUTO: 29.1 PG (ref 26.6–33)
MCHC RBC AUTO-ENTMCNC: 31.5 G/DL (ref 31.5–35.7)
MCV RBC AUTO: 92.4 FL (ref 79–97)
MONOCYTES # BLD AUTO: 0.46 10*3/MM3 (ref 0.1–0.9)
MONOCYTES NFR BLD AUTO: 6 % (ref 5–12)
NEUTROPHILS NFR BLD AUTO: 4.37 10*3/MM3 (ref 1.7–7)
NEUTROPHILS NFR BLD AUTO: 56.6 % (ref 42.7–76)
NITRITE UR QL STRIP: NEGATIVE
NRBC BLD AUTO-RTO: 0 /100 WBC (ref 0–0.2)
PH UR STRIP.AUTO: 6 [PH] (ref 5–8)
PLATELET # BLD AUTO: 258 10*3/MM3 (ref 140–450)
PMV BLD AUTO: 10.6 FL (ref 6–12)
POTASSIUM SERPL-SCNC: 3.6 MMOL/L (ref 3.5–5.2)
PROT SERPL-MCNC: 7.1 G/DL (ref 6–8.5)
PROT UR QL STRIP: NEGATIVE
RBC # BLD AUTO: 4.5 10*6/MM3 (ref 3.77–5.28)
RBC # UR STRIP: NORMAL /HPF
REF LAB TEST METHOD: NORMAL
SODIUM SERPL-SCNC: 138 MMOL/L (ref 136–145)
SP GR UR STRIP: 1.01 (ref 1–1.03)
SQUAMOUS #/AREA URNS HPF: NORMAL /HPF
UROBILINOGEN UR QL STRIP: ABNORMAL
WBC # UR STRIP: NORMAL /HPF
WBC NRBC COR # BLD AUTO: 7.71 10*3/MM3 (ref 3.4–10.8)
WHOLE BLOOD HOLD COAG: NORMAL

## 2025-06-01 PROCEDURE — 25010000002 FAMOTIDINE 10 MG/ML SOLUTION: Performed by: NURSE PRACTITIONER

## 2025-06-01 PROCEDURE — 83735 ASSAY OF MAGNESIUM: CPT | Performed by: NURSE PRACTITIONER

## 2025-06-01 PROCEDURE — 99284 EMERGENCY DEPT VISIT MOD MDM: CPT

## 2025-06-01 PROCEDURE — 83690 ASSAY OF LIPASE: CPT | Performed by: NURSE PRACTITIONER

## 2025-06-01 PROCEDURE — 96375 TX/PRO/DX INJ NEW DRUG ADDON: CPT

## 2025-06-01 PROCEDURE — 25810000003 SODIUM CHLORIDE 0.9 % SOLUTION: Performed by: NURSE PRACTITIONER

## 2025-06-01 PROCEDURE — 25010000002 ONDANSETRON PER 1 MG: Performed by: NURSE PRACTITIONER

## 2025-06-01 PROCEDURE — 80053 COMPREHEN METABOLIC PANEL: CPT | Performed by: NURSE PRACTITIONER

## 2025-06-01 PROCEDURE — 76705 ECHO EXAM OF ABDOMEN: CPT

## 2025-06-01 PROCEDURE — 96374 THER/PROPH/DIAG INJ IV PUSH: CPT

## 2025-06-01 PROCEDURE — 85025 COMPLETE CBC W/AUTO DIFF WBC: CPT | Performed by: NURSE PRACTITIONER

## 2025-06-01 PROCEDURE — 81001 URINALYSIS AUTO W/SCOPE: CPT

## 2025-06-01 PROCEDURE — 96361 HYDRATE IV INFUSION ADD-ON: CPT

## 2025-06-01 PROCEDURE — 25010000002 HYDROMORPHONE PER 4 MG: Performed by: NURSE PRACTITIONER

## 2025-06-01 RX ORDER — LIDOCAINE HYDROCHLORIDE 20 MG/ML
10 SOLUTION OROPHARYNGEAL ONCE
Status: COMPLETED | OUTPATIENT
Start: 2025-06-01 | End: 2025-06-01

## 2025-06-01 RX ORDER — HYDROMORPHONE HYDROCHLORIDE 1 MG/ML
0.5 INJECTION, SOLUTION INTRAMUSCULAR; INTRAVENOUS; SUBCUTANEOUS ONCE
Refills: 0 | Status: COMPLETED | OUTPATIENT
Start: 2025-06-01 | End: 2025-06-01

## 2025-06-01 RX ORDER — ALUMINA, MAGNESIA, AND SIMETHICONE 2400; 2400; 240 MG/30ML; MG/30ML; MG/30ML
15 SUSPENSION ORAL EVERY 6 HOURS PRN
Status: DISCONTINUED | OUTPATIENT
Start: 2025-06-01 | End: 2025-06-01 | Stop reason: HOSPADM

## 2025-06-01 RX ORDER — FAMOTIDINE 10 MG/ML
20 INJECTION, SOLUTION INTRAVENOUS ONCE
Status: COMPLETED | OUTPATIENT
Start: 2025-06-01 | End: 2025-06-01

## 2025-06-01 RX ORDER — SODIUM CHLORIDE 0.9 % (FLUSH) 0.9 %
10 SYRINGE (ML) INJECTION AS NEEDED
Status: DISCONTINUED | OUTPATIENT
Start: 2025-06-01 | End: 2025-06-01 | Stop reason: HOSPADM

## 2025-06-01 RX ORDER — ONDANSETRON 2 MG/ML
4 INJECTION INTRAMUSCULAR; INTRAVENOUS ONCE
Status: COMPLETED | OUTPATIENT
Start: 2025-06-01 | End: 2025-06-01

## 2025-06-01 RX ORDER — DICYCLOMINE HCL 20 MG
20 TABLET ORAL EVERY 6 HOURS PRN
Qty: 20 TABLET | Refills: 0 | Status: SHIPPED | OUTPATIENT
Start: 2025-06-01

## 2025-06-01 RX ORDER — ONDANSETRON 4 MG/1
4 TABLET, ORALLY DISINTEGRATING ORAL 4 TIMES DAILY PRN
Qty: 10 TABLET | Refills: 0 | Status: SHIPPED | OUTPATIENT
Start: 2025-06-01

## 2025-06-01 RX ADMIN — ONDANSETRON 4 MG: 2 INJECTION, SOLUTION INTRAMUSCULAR; INTRAVENOUS at 15:22

## 2025-06-01 RX ADMIN — FAMOTIDINE 20 MG: 10 INJECTION INTRAVENOUS at 16:29

## 2025-06-01 RX ADMIN — LIDOCAINE HYDROCHLORIDE 10 ML: 20 SOLUTION ORAL at 16:33

## 2025-06-01 RX ADMIN — ALUMINUM HYDROXIDE, MAGNESIUM HYDROXIDE, AND DIMETHICONE 15 ML: 400; 400; 40 SUSPENSION ORAL at 16:33

## 2025-06-01 RX ADMIN — HYDROMORPHONE HYDROCHLORIDE 0.5 MG: 1 INJECTION, SOLUTION INTRAMUSCULAR; INTRAVENOUS; SUBCUTANEOUS at 15:22

## 2025-06-01 RX ADMIN — SODIUM CHLORIDE 1000 ML: 900 INJECTION, SOLUTION INTRAVENOUS at 15:22

## 2025-06-01 NOTE — ED NOTES
Patient reports RUQ pain starting Thursday. Patient states pain feels similar to pancreatitis flare up. Patient reports N/V. Patient states she took zofran approximately 1 hour PTA.

## 2025-06-01 NOTE — ED PROVIDER NOTES
Subjective   Provider in Triage Note  Upper abdominal pain nausea vomiting diarrhea.  History of nonalcoholic pancreatitis.  Status post remote history of cholecystectomy.  No upper respiratory complaints urinary complaints or fever    Due to significant overcrowding in the emergency department patient was initially seen and evaluated in triage.  Provider in triage recommended patient placement in the treatment area to initiate therapy and movement to an ER bed as soon as possible.   Orders placed; medications will be deferred to main provider per protocol.        History of Present Illness  Reviewed the provider in triage note and attest this is the HPI    The patient denies any fever or chills she denies any vomiting.  She denies any urinary symptoms.    She denies ever drinking alcohol      Review of Systems   Gastrointestinal:  Positive for abdominal pain and nausea.       Past Medical History:   Diagnosis Date    Endometriosis     Fibromyalgia     Hyperlipidemia     Hypertension        Allergies   Allergen Reactions    Roxicodone [Oxycodone Hcl] Itching and Nausea Only    Toradol [Ketorolac Tromethamine] Itching and Irritability    Gabapentin Nausea And Vomiting    Naproxen Nausea And Vomiting    Pregabalin Hives    Morphine Itching       Past Surgical History:   Procedure Laterality Date    ABDOMINAL SURGERY       SECTION      CHOLECYSTECTOMY WITH INTRAOPERATIVE CHOLANGIOGRAM N/A 2022    Procedure: CHOLECYSTECTOMY LAPAROSCOPIC INTRAOPERATIVE CHOLANGIOGRAM;  Surgeon: Robert Dale MD;  Location: [facility] MAIN OR;  Service: General;  Laterality: N/A;    COLONOSCOPY N/A 3/7/2023    Procedure: COLONOSCOPY with ileum and large intestine random biopsies R/O microscopic colitis;  Surgeon: NAVIN Schneider MD;  Location: [facility] ENDOSCOPY;  Service: Gastroenterology;  Laterality: N/A;  hemorrhoids    ENDOSCOPY N/A 2022    Procedure: ESOPHAGOGASTRODUODENOSCOPY WITH BIOPSY;  Surgeon: NAVIN Schneider  MD;  Location: [facility] ENDOSCOPY;  Service: Gastroenterology;  Laterality: N/A;    HYSTERECTOMY      UPPER ENDOSCOPIC ULTRASOUND W/ FNA N/A 12/4/2023    Procedure: ESOPHAGOGASTRODUODENOSCOPY WITH ENDOSCOPIC ULTRASOUND;  Surgeon: Malik King MD;  Location: [facility] ENDOSCOPY;  Service: Gastroenterology;  Laterality: N/A;  POST: CHRONIC PANCREATITIS       Family History   Problem Relation Age of Onset    Cancer Mother     Heart disease Father     Pancreatic cancer Sister        Social History     Socioeconomic History    Marital status:    Tobacco Use    Smoking status: Every Day     Current packs/day: 1.00     Average packs/day: 1 pack/day for 35.0 years (35.0 ttl pk-yrs)     Types: Cigarettes     Start date: 6/5/1990    Smokeless tobacco: Never   Vaping Use    Vaping status: Never Used   Substance and Sexual Activity    Alcohol use: Never    Drug use: Never    Sexual activity: Defer           Objective   Physical Exam  Vitals reviewed.   Constitutional:       Appearance: She is well-developed. She is obese.   HENT:      Head: Normocephalic and atraumatic.   Eyes:      Conjunctiva/sclera: Conjunctivae normal.      Pupils: Pupils are equal, round, and reactive to light.   Cardiovascular:      Rate and Rhythm: Normal rate and regular rhythm.      Pulses:           Dorsalis pedis pulses are 2+ on the right side and 2+ on the left side.        Posterior tibial pulses are 2+ on the right side and 2+ on the left side.      Heart sounds: Normal heart sounds.   Pulmonary:      Effort: Pulmonary effort is normal. No respiratory distress.      Breath sounds: Normal breath sounds. No wheezing.   Abdominal:      General: Abdomen is protuberant. Bowel sounds are normal.      Palpations: Abdomen is soft.      Tenderness: There is abdominal tenderness in the right upper quadrant, epigastric area and left upper quadrant. There is no guarding or rebound.   Musculoskeletal:         General: No deformity. Normal range of motion.  "     Cervical back: Normal range of motion and neck supple.   Feet:      Right foot:      Skin integrity: Skin integrity normal.      Left foot:      Skin integrity: Skin integrity normal.   Skin:     General: Skin is warm and dry.      Capillary Refill: Capillary refill takes less than 2 seconds.      Findings: No rash.   Neurological:      General: No focal deficit present.      Mental Status: She is alert and oriented to person, place, and time.      GCS: GCS eye subscore is 4. GCS verbal subscore is 5. GCS motor subscore is 6.      Motor: No weakness.   Psychiatric:         Mood and Affect: Mood normal.         Behavior: Behavior normal.         Procedures           ED Course  ED Course as of 06/01/25 1709   Sun Jun 01, 2025   1526 Marked ready for US [KW]   1554 Patient going to US now [KW]   1613 Waiting for US to be read   [KW]      ED Course User Index  [KW] Crystal Canseco, APRN                                           /70   Pulse 65   Temp 98.2 °F (36.8 °C) (Oral)   Resp 18   Ht 162.6 cm (64\")   Wt 94.3 kg (207 lb 14.3 oz)   SpO2 97%   BMI 35.68 kg/m²   Labs Reviewed   URINALYSIS W/ MICROSCOPIC IF INDICATED (NO CULTURE) - Abnormal; Notable for the following components:       Result Value    Blood, UA Trace (*)     All other components within normal limits   LIPASE - Normal   CBC WITH AUTO DIFFERENTIAL - Normal   MAGNESIUM - Normal   COMPREHENSIVE METABOLIC PANEL    Narrative:     GFR Categories in Chronic Kidney Disease (CKD)              GFR Category          GFR (mL/min/1.73)    Interpretation  G1                    90 or greater        Normal or high (1)  G2                    60-89                Mild decrease (1)  G3a                   45-59                Mild to moderate decrease  G3b                   30-44                Moderate to severe decrease  G4                    15-29                Severe decrease  G5                    14 or less           Kidney failure    (1)In the " absence of evidence of kidney disease, neither GFR category G1 or G2 fulfill the criteria for CKD.    eGFR calculation 2021 CKD-EPI creatinine equation, which does not include race as a factor   URINALYSIS, MICROSCOPIC ONLY   CBC AND DIFFERENTIAL    Narrative:     The following orders were created for panel order CBC & Differential.  Procedure                               Abnormality         Status                     ---------                               -----------         ------                     CBC Auto Differential[[id number]]        Normal              Final result                 Please view results for these tests on the individual orders.   EXTRA TUBES    Narrative:     The following orders were created for panel order Extra Tubes.  Procedure                               Abnormality         Status                     ---------                               -----------         ------                     Gold Top - SST[[id number]]                                   Final result               Light Blue Top[[id number]]                                   Final result                 Please view results for these tests on the individual orders.   GOLD TOP - SST   LIGHT BLUE TOP     Medications   sodium chloride 0.9 % flush 10 mL (has no administration in time range)   aluminum-magnesium hydroxide-simethicone (MAALOX MAX) 400-400-40 MG/5ML suspension 15 mL (15 mL Oral Given 6/1/25 1633)   sodium chloride 0.9 % bolus 1,000 mL (0 mL Intravenous Stopped 6/1/25 1707)   HYDROmorphone (DILAUDID) injection 0.5 mg (0.5 mg Intravenous Given 6/1/25 1522)   ondansetron (ZOFRAN) injection 4 mg (4 mg Intravenous Given 6/1/25 1522)   Lidocaine Viscous HCl (XYLOCAINE) 2 % solution 10 mL (10 mL Mouth/Throat Given 6/1/25 1633)   famotidine (PEPCID) injection 20 mg (20 mg Intravenous Given 6/1/25 1629)               Medical Decision Making  CT Abdomen Pelvis With Contrast  Result Date: 5/24/2025  Impression: 1.No acute  intra-abdominal or intrapelvic process. 2.Significant hepatic steatosis. 3.Ancillary findings as described above. Electronically Signed: Farhana Tan MD  5/24/2025 9:28 PM EDT  Workstation ID: [id number]      Patient is a 49-year-old female who comes in with upper abdominal pain with a history of pancreatitis she has had some nausea without vomiting concerning for acute pancreatitis, GERD, bowel obstruction, mesenteric adenitis this is not an all-inclusive list-  Patient had IV established and blood work was obtained and reviewed patient was found have a normal CBC and chemistry she was treated initially with Dilaudid and Zofran for pain which she states helped minimally she was then given some Pepcid and a GI cocktail after the ultrasound was performed the ultrasound was read by the radiologist myself and Dr. Delatorre as have no acute abnormalities.    The patient was advised that she needs to follow-up with GI as no acute disease were identified patient was feeling better at discharge.        Problems Addressed:  Pain of upper abdomen: complicated acute illness or injury    Amount and/or Complexity of Data Reviewed  Labs: ordered. Decision-making details documented in ED Course.  Radiology: ordered and independent interpretation performed. Decision-making details documented in ED Course.  ECG/medicine tests: ordered and independent interpretation performed. Decision-making details documented in ED Course.    Risk  OTC drugs.  Prescription drug management.        Final diagnoses:   Pain of upper abdomen       ED Disposition  ED Disposition       ED Disposition   Discharge    Condition   Stable    Comment   --               Anita Blanco MD  [address] [facility] [place] [address]  [phone]      As needed, If symptoms worsen    Malik King MD  [address] [facility] [place] [address]  [phone]    In 3 days  As needed, If symptoms worsen         Medication List        New Prescriptions      dicyclomine 20 MG  tablet  Commonly known as: BENTYL  Take 1 tablet by mouth Every 6 (Six) Hours As Needed for Abdominal Cramping.     ondansetron ODT 4 MG disintegrating tablet  Commonly known as: ZOFRAN-ODT  Place 1 tablet under the tongue 4 (Four) Times a Day As Needed for Vomiting or Nausea.               Where to Get Your Medications        These medications were sent to [facility] [place] - [address] - [phone] [place] [phone]   [address] [place] [address]      Phone: [phone]   dicyclomine 20 MG tablet  ondansetron ODT 4 MG disintegrating tablet            Crystal Canseco, APRN  06/01/25 5256

## 2025-06-01 NOTE — Clinical Note
[facility] EMERGENCY DEPARTMENT  [address] [place] [address]  Phone: [phone]    Dianna Claros was seen and treated in our emergency department on 6/1/2025.  She may return to work on 06/03/2025.         Thank you for choosing [facility].    Joellen Hoover, RN

## 2025-06-01 NOTE — DISCHARGE INSTRUCTIONS
Push clear liquids    Use Zofran as needed for nausea use Bentyl as needed for abdominal pain    You should see GI for follow-up call one of the providers listed above for follow-up    Return if worse

## 2025-06-02 ENCOUNTER — TELEPHONE (OUTPATIENT)
Dept: FAMILY MEDICINE CLINIC | Facility: CLINIC | Age: 50
End: 2025-06-02
Payer: COMMERCIAL

## 2025-06-02 NOTE — TELEPHONE ENCOUNTER
Please call patient and make sure she is doing better from the upper abdominal pain that is caused her to go to [place] ER.  We should recheck her if his pain is not improved

## 2025-07-10 ENCOUNTER — HOSPITAL ENCOUNTER (EMERGENCY)
Facility: HOSPITAL | Age: 50
Discharge: HOME | End: 2025-07-11
Payer: COMMERCIAL

## 2025-07-10 ENCOUNTER — APPOINTMENT (OUTPATIENT)
Dept: CT IMAGING | Facility: HOSPITAL | Age: 50
End: 2025-07-10
Payer: COMMERCIAL

## 2025-07-10 DIAGNOSIS — R10.12 LEFT UPPER QUADRANT ABDOMINAL PAIN: Primary | ICD-10-CM

## 2025-07-10 LAB
ALBUMIN SERPL-MCNC: 4.4 G/DL (ref 3.5–5.2)
ALBUMIN/GLOB SERPL: 1.7 G/DL
ALP SERPL-CCNC: 110 U/L (ref 39–117)
ALT SERPL W P-5'-P-CCNC: 22 U/L (ref 1–33)
AMPHET+METHAMPHET UR QL: NEGATIVE
AMPHETAMINES UR QL: NEGATIVE
ANION GAP SERPL CALCULATED.3IONS-SCNC: 11.6 MMOL/L (ref 5–15)
APTT PPP: 26.7 SECONDS (ref 22.7–35.4)
AST SERPL-CCNC: 18 U/L (ref 1–32)
BACTERIA UR QL AUTO: ABNORMAL /HPF
BARBITURATES UR QL SCN: NEGATIVE
BASOPHILS # BLD AUTO: 0.02 10*3/MM3 (ref 0–0.2)
BASOPHILS NFR BLD AUTO: 0.3 % (ref 0–1.5)
BENZODIAZ UR QL SCN: NEGATIVE
BILIRUB SERPL-MCNC: 0.4 MG/DL (ref 0–1.2)
BILIRUB UR QL STRIP: NEGATIVE
BUN SERPL-MCNC: 12.3 MG/DL (ref 6–20)
BUN/CREAT SERPL: 13.8 (ref 7–25)
BUPRENORPHINE SERPL-MCNC: NEGATIVE NG/ML
CALCIUM SPEC-SCNC: 9.4 MG/DL (ref 8.6–10.5)
CANNABINOIDS SERPL QL: POSITIVE
CHLORIDE SERPL-SCNC: 106 MMOL/L (ref 98–107)
CLARITY UR: CLEAR
CO2 SERPL-SCNC: 23.4 MMOL/L (ref 22–29)
COCAINE UR QL: NEGATIVE
COLOR UR: YELLOW
CREAT SERPL-MCNC: 0.89 MG/DL (ref 0.57–1)
DEPRECATED RDW RBC AUTO: 42.3 FL (ref 37–54)
EGFRCR SERPLBLD CKD-EPI 2021: 80.6 ML/MIN/1.73
EOSINOPHIL # BLD AUTO: 0.11 10*3/MM3 (ref 0–0.4)
EOSINOPHIL NFR BLD AUTO: 1.5 % (ref 0.3–6.2)
ERYTHROCYTE [DISTWIDTH] IN BLOOD BY AUTOMATED COUNT: 12.7 % (ref 12.3–15.4)
ETHANOL UR QL: <0.01 %
GLOBULIN UR ELPH-MCNC: 2.6 GM/DL
GLUCOSE SERPL-MCNC: 97 MG/DL (ref 65–99)
GLUCOSE UR STRIP-MCNC: NEGATIVE MG/DL
HCT VFR BLD AUTO: 40.2 % (ref 34–46.6)
HGB BLD-MCNC: 12.9 G/DL (ref 12–15.9)
HGB UR QL STRIP.AUTO: ABNORMAL
HOLD SPECIMEN: NORMAL
HOLD SPECIMEN: NORMAL
HYALINE CASTS UR QL AUTO: ABNORMAL /LPF
IMM GRANULOCYTES # BLD AUTO: 0.02 10*3/MM3 (ref 0–0.05)
IMM GRANULOCYTES NFR BLD AUTO: 0.3 % (ref 0–0.5)
INR PPP: 0.91 (ref 0.9–1.1)
KETONES UR QL STRIP: ABNORMAL
LEUKOCYTE ESTERASE UR QL STRIP.AUTO: NEGATIVE
LIPASE SERPL-CCNC: 135 U/L (ref 13–60)
LYMPHOCYTES # BLD AUTO: 3.29 10*3/MM3 (ref 0.7–3.1)
LYMPHOCYTES NFR BLD AUTO: 44.2 % (ref 19.6–45.3)
MAGNESIUM SERPL-MCNC: 2.2 MG/DL (ref 1.6–2.6)
MCH RBC QN AUTO: 29.3 PG (ref 26.6–33)
MCHC RBC AUTO-ENTMCNC: 32.1 G/DL (ref 31.5–35.7)
MCV RBC AUTO: 91.4 FL (ref 79–97)
METHADONE UR QL SCN: NEGATIVE
MONOCYTES # BLD AUTO: 0.5 10*3/MM3 (ref 0.1–0.9)
MONOCYTES NFR BLD AUTO: 6.7 % (ref 5–12)
NEUTROPHILS NFR BLD AUTO: 3.51 10*3/MM3 (ref 1.7–7)
NEUTROPHILS NFR BLD AUTO: 47 % (ref 42.7–76)
NITRITE UR QL STRIP: NEGATIVE
NRBC BLD AUTO-RTO: 0 /100 WBC (ref 0–0.2)
OPIATES UR QL: NEGATIVE
OXYCODONE UR QL SCN: NEGATIVE
PCP UR QL SCN: NEGATIVE
PH UR STRIP.AUTO: 5.5 [PH] (ref 5–8)
PHOSPHATE SERPL-MCNC: 4.1 MG/DL (ref 2.5–4.5)
PLATELET # BLD AUTO: 213 10*3/MM3 (ref 140–450)
PMV BLD AUTO: 10.5 FL (ref 6–12)
POTASSIUM SERPL-SCNC: 3.8 MMOL/L (ref 3.5–5.2)
PROT SERPL-MCNC: 7 G/DL (ref 6–8.5)
PROT UR QL STRIP: NEGATIVE
PROTHROMBIN TIME: 12.2 SECONDS (ref 11.7–14.2)
RBC # BLD AUTO: 4.4 10*6/MM3 (ref 3.77–5.28)
RBC # UR STRIP: ABNORMAL /HPF
REF LAB TEST METHOD: ABNORMAL
SODIUM SERPL-SCNC: 141 MMOL/L (ref 136–145)
SP GR UR STRIP: 1.02 (ref 1–1.03)
SQUAMOUS #/AREA URNS HPF: ABNORMAL /HPF
TRICYCLICS UR QL SCN: NEGATIVE
UROBILINOGEN UR QL STRIP: ABNORMAL
WBC # UR STRIP: ABNORMAL /HPF
WBC NRBC COR # BLD AUTO: 7.45 10*3/MM3 (ref 3.4–10.8)
WHOLE BLOOD HOLD COAG: NORMAL
WHOLE BLOOD HOLD SPECIMEN: NORMAL

## 2025-07-10 PROCEDURE — 83735 ASSAY OF MAGNESIUM: CPT | Performed by: OCCUPATIONAL THERAPIST

## 2025-07-10 PROCEDURE — 25810000003 LACTATED RINGERS SOLUTION: Performed by: OCCUPATIONAL THERAPIST

## 2025-07-10 PROCEDURE — 80306 DRUG TEST PRSMV INSTRMNT: CPT | Performed by: OCCUPATIONAL THERAPIST

## 2025-07-10 PROCEDURE — 25510000001 IOPAMIDOL PER 1 ML: Performed by: OCCUPATIONAL THERAPIST

## 2025-07-10 PROCEDURE — 96374 THER/PROPH/DIAG INJ IV PUSH: CPT

## 2025-07-10 PROCEDURE — 84484 ASSAY OF TROPONIN QUANT: CPT | Performed by: OCCUPATIONAL THERAPIST

## 2025-07-10 PROCEDURE — 25010000002 ONDANSETRON PER 1 MG: Performed by: OCCUPATIONAL THERAPIST

## 2025-07-10 PROCEDURE — 81001 URINALYSIS AUTO W/SCOPE: CPT | Performed by: OCCUPATIONAL THERAPIST

## 2025-07-10 PROCEDURE — 85730 THROMBOPLASTIN TIME PARTIAL: CPT | Performed by: OCCUPATIONAL THERAPIST

## 2025-07-10 PROCEDURE — 83690 ASSAY OF LIPASE: CPT | Performed by: OCCUPATIONAL THERAPIST

## 2025-07-10 PROCEDURE — 96375 TX/PRO/DX INJ NEW DRUG ADDON: CPT

## 2025-07-10 PROCEDURE — 99285 EMERGENCY DEPT VISIT HI MDM: CPT

## 2025-07-10 PROCEDURE — 85025 COMPLETE CBC W/AUTO DIFF WBC: CPT | Performed by: OCCUPATIONAL THERAPIST

## 2025-07-10 PROCEDURE — 25010000002 HYDROMORPHONE PER 4 MG: Performed by: OCCUPATIONAL THERAPIST

## 2025-07-10 PROCEDURE — 85610 PROTHROMBIN TIME: CPT | Performed by: OCCUPATIONAL THERAPIST

## 2025-07-10 PROCEDURE — 96376 TX/PRO/DX INJ SAME DRUG ADON: CPT

## 2025-07-10 PROCEDURE — 82077 ASSAY SPEC XCP UR&BREATH IA: CPT | Performed by: OCCUPATIONAL THERAPIST

## 2025-07-10 PROCEDURE — 80053 COMPREHEN METABOLIC PANEL: CPT | Performed by: OCCUPATIONAL THERAPIST

## 2025-07-10 PROCEDURE — 93005 ELECTROCARDIOGRAM TRACING: CPT

## 2025-07-10 PROCEDURE — 84100 ASSAY OF PHOSPHORUS: CPT | Performed by: OCCUPATIONAL THERAPIST

## 2025-07-10 PROCEDURE — 74177 CT ABD & PELVIS W/CONTRAST: CPT

## 2025-07-10 RX ORDER — HYDROMORPHONE HYDROCHLORIDE 1 MG/ML
0.25 INJECTION, SOLUTION INTRAMUSCULAR; INTRAVENOUS; SUBCUTANEOUS ONCE
Refills: 0 | Status: COMPLETED | OUTPATIENT
Start: 2025-07-11 | End: 2025-07-10

## 2025-07-10 RX ORDER — HYDROMORPHONE HYDROCHLORIDE 1 MG/ML
0.5 INJECTION, SOLUTION INTRAMUSCULAR; INTRAVENOUS; SUBCUTANEOUS ONCE
Refills: 0 | Status: COMPLETED | OUTPATIENT
Start: 2025-07-10 | End: 2025-07-10

## 2025-07-10 RX ORDER — IOPAMIDOL 755 MG/ML
100 INJECTION, SOLUTION INTRAVASCULAR
Status: COMPLETED | OUTPATIENT
Start: 2025-07-10 | End: 2025-07-10

## 2025-07-10 RX ORDER — ONDANSETRON 2 MG/ML
4 INJECTION INTRAMUSCULAR; INTRAVENOUS ONCE
Status: COMPLETED | OUTPATIENT
Start: 2025-07-11 | End: 2025-07-10

## 2025-07-10 RX ADMIN — ONDANSETRON 4 MG: 2 INJECTION, SOLUTION INTRAMUSCULAR; INTRAVENOUS at 23:55

## 2025-07-10 RX ADMIN — HYDROMORPHONE HYDROCHLORIDE 0.5 MG: 1 INJECTION, SOLUTION INTRAMUSCULAR; INTRAVENOUS; SUBCUTANEOUS at 22:15

## 2025-07-10 RX ADMIN — SODIUM CHLORIDE, SODIUM LACTATE, POTASSIUM CHLORIDE, CALCIUM CHLORIDE 1500 ML: 20; 30; 600; 310 INJECTION, SOLUTION INTRAVENOUS at 22:18

## 2025-07-10 RX ADMIN — HYDROMORPHONE HYDROCHLORIDE 0.25 MG: 1 INJECTION, SOLUTION INTRAMUSCULAR; INTRAVENOUS; SUBCUTANEOUS at 23:55

## 2025-07-10 RX ADMIN — IOPAMIDOL 100 ML: 755 INJECTION, SOLUTION INTRAVENOUS at 23:21

## 2025-07-10 NOTE — Clinical Note
[facility] EMERGENCY DEPARTMENT  [address] [place] [address]  Phone: [phone]    Dianna Claros was seen and treated in our emergency department on 7/10/2025.  She may return to work on 07/12/2025.         Thank you for choosing [facility].    Velvet Keita PA-C

## 2025-07-11 VITALS
HEART RATE: 70 BPM | DIASTOLIC BLOOD PRESSURE: 59 MMHG | WEIGHT: 206.35 LBS | OXYGEN SATURATION: 95 % | BODY MASS INDEX: 35.23 KG/M2 | SYSTOLIC BLOOD PRESSURE: 102 MMHG | RESPIRATION RATE: 16 BRPM | HEIGHT: 64 IN | TEMPERATURE: 97.5 F

## 2025-07-11 LAB
QT INTERVAL: 413 MS
QTC INTERVAL: 478 MS
TROPONIN T SERPL HS-MCNC: <6 NG/L

## 2025-07-11 RX ORDER — ONDANSETRON 4 MG/1
4 TABLET, ORALLY DISINTEGRATING ORAL EVERY 8 HOURS PRN
Qty: 15 TABLET | Refills: 0 | Status: SHIPPED | OUTPATIENT
Start: 2025-07-11

## 2025-07-11 NOTE — DISCHARGE INSTRUCTIONS
Follow-up with GI doctor and PCP.    Take medication as prescribed.    Clear liquid diet until instructed otherwise by your doctors or symptoms subside.    Return to the ER with new or worsening symptoms.    Take over-the-counter medications as needed for pain according to package instructions.

## 2025-07-11 NOTE — ED PROVIDER NOTES
Subjective   History of Present Illness  Patient is a 49-year-old female with past medical history significant for fibromyalgia, hypertension, hyperlipidemia, and pancreatitis presenting to the ER with complaints of epigastric and left upper quadrant pain x 4 days.  She reports that she has had sharp upper abdominal pain with radiation to her back.  Feels like when she had pancreatitis in the past.  Patient reports that pain significantly increased today with nausea, diarrhea, and 1 episode of vomiting.  Patient reports that she has had some increased stress recently and has been on a clear liquid diet for 3 days.  Patient denies any hematochezia, melena, hematemesis, medication change, and alcohol use.  Patient has seen gastroenterology in the past.        Review of Systems   Constitutional:  Negative for chills and fever.   Gastrointestinal:  Positive for abdominal pain.       Past Medical History:   Diagnosis Date    Endometriosis     Fibromyalgia     Hyperlipidemia     Hypertension        Allergies   Allergen Reactions    Roxicodone [Oxycodone Hcl] Itching and Nausea Only    Toradol [Ketorolac Tromethamine] Itching and Irritability    Gabapentin Nausea And Vomiting    Naproxen Nausea And Vomiting    Pregabalin Hives    Morphine Itching       Past Surgical History:   Procedure Laterality Date    ABDOMINAL SURGERY       SECTION      CHOLECYSTECTOMY WITH INTRAOPERATIVE CHOLANGIOGRAM N/A 2022    Procedure: CHOLECYSTECTOMY LAPAROSCOPIC INTRAOPERATIVE CHOLANGIOGRAM;  Surgeon: Robert Dale MD;  Location: [facility] MAIN OR;  Service: General;  Laterality: N/A;    COLONOSCOPY N/A 3/7/2023    Procedure: COLONOSCOPY with ileum and large intestine random biopsies R/O microscopic colitis;  Surgeon: NAVIN Schneider MD;  Location: [facility] ENDOSCOPY;  Service: Gastroenterology;  Laterality: N/A;  hemorrhoids    ENDOSCOPY N/A 2022    Procedure: ESOPHAGOGASTRODUODENOSCOPY WITH BIOPSY;  Surgeon: NAVIN Schneider  MD Jono;  Location: [facility] ENDOSCOPY;  Service: Gastroenterology;  Laterality: N/A;    HYSTERECTOMY      UPPER ENDOSCOPIC ULTRASOUND W/ FNA N/A 12/4/2023    Procedure: ESOPHAGOGASTRODUODENOSCOPY WITH ENDOSCOPIC ULTRASOUND;  Surgeon: Malik King MD;  Location: [facility] ENDOSCOPY;  Service: Gastroenterology;  Laterality: N/A;  POST: CHRONIC PANCREATITIS       Family History   Problem Relation Age of Onset    Cancer Mother     Heart disease Father     Pancreatic cancer Sister        Social History     Socioeconomic History    Marital status:    Tobacco Use    Smoking status: Every Day     Current packs/day: 1.00     Average packs/day: 1 pack/day for 35.1 years (35.1 ttl pk-yrs)     Types: Cigarettes     Start date: 6/5/1990    Smokeless tobacco: Never   Vaping Use    Vaping status: Never Used   Substance and Sexual Activity    Alcohol use: Never    Drug use: Never    Sexual activity: Defer           Objective   Physical Exam  Vitals and nursing note reviewed.   Constitutional:       General: She is not in acute distress.     Appearance: She is well-developed. She is not ill-appearing, toxic-appearing or diaphoretic.   HENT:      Head: Normocephalic and atraumatic.      Mouth/Throat:      Mouth: Mucous membranes are moist.   Eyes:      General: No scleral icterus.     Extraocular Movements: Extraocular movements intact.   Cardiovascular:      Rate and Rhythm: Normal rate and regular rhythm.      Heart sounds: Normal heart sounds. No murmur heard.     No friction rub. No gallop.   Pulmonary:      Effort: Pulmonary effort is normal. No respiratory distress.      Breath sounds: Normal breath sounds. No stridor. No wheezing, rhonchi or rales.   Abdominal:      General: Abdomen is flat. Bowel sounds are normal. There is no distension.      Palpations: Abdomen is soft. There is no mass or pulsatile mass.      Tenderness: There is abdominal tenderness in the epigastric area and left upper quadrant. There is no  guarding. Negative signs include Obregon's sign, Rovsing's sign and McBurney's sign.   Skin:     General: Skin is warm and dry.      Capillary Refill: Capillary refill takes less than 2 seconds.      Coloration: Skin is not cyanotic, jaundiced, mottled or pale.      Findings: No erythema or rash.   Neurological:      General: No focal deficit present.      Mental Status: She is alert.   Psychiatric:         Mood and Affect: Mood normal.         Behavior: Behavior normal.         Procedures           ED Course        Labs Reviewed   LIPASE - Abnormal; Notable for the following components:       Result Value    Lipase 135 (*)     All other components within normal limits   URINALYSIS W/ CULTURE IF INDICATED - Abnormal; Notable for the following components:    Ketones, UA Trace (*)     Blood, UA Small (1+) (*)     All other components within normal limits    Narrative:     In absence of clinical symptoms, the presence of pyuria, bacteria, and/or nitrites on the urinalysis result does not correlate with infection.   URINE DRUG SCREEN - Abnormal; Notable for the following components:    THC, Screen, Urine Positive (*)     All other components within normal limits    Narrative:     Cutoff For Drugs Screened:    Amphetamines               500 ng/ml  Barbiturates               200 ng/ml  Benzodiazepines            150 ng/ml  Cocaine                    150 ng/ml  Methadone                  200 ng/ml  Opiates                    100 ng/ml  Phencyclidine               25 ng/ml  THC                         50 ng/ml  Methamphetamine            500 ng/ml  Tricyclic Antidepressants  300 ng/ml  Oxycodone                  100 ng/ml  Buprenorphine               10 ng/ml    The normal value for all drugs tested is negative. This report includes unconfirmed screening results, with the cutoff values listed, to be used for medical treatment purposes only.  Unconfirmed results must not be used for non-medical purposes such as employment or  legal testing.  Clinical consideration should be applied to any drug of abuse test, particularly when unconfirmed results are used.    All urine drugs of abuse requests without chain of custody are for medical screening purposes only.  False positives are possible.     CBC WITH AUTO DIFFERENTIAL - Abnormal; Notable for the following components:    Lymphocytes, Absolute 3.29 (*)     All other components within normal limits   URINALYSIS, MICROSCOPIC ONLY - Abnormal; Notable for the following components:    RBC, UA 3-5 (*)     WBC, UA 3-5 (*)     Bacteria, UA Trace (*)     Squamous Epithelial Cells, UA 3-6 (*)     All other components within normal limits   PROTIME-INR - Normal   APTT - Normal   MAGNESIUM - Normal   PHOSPHORUS - Normal   TROPONIN - Normal    Narrative:     High Sensitive Troponin T Reference Range:  <14.0 ng/L- Negative Female for AMI  <22.0 ng/L- Negative Male for AMI  >=14 - Abnormal Female indicating possible myocardial injury.  >=22 - Abnormal Male indicating possible myocardial injury.   Clinicians would have to utilize clinical acumen, EKG, Troponin, and serial changes to determine if it is an Acute Myocardial Infarction or myocardial injury due to an underlying chronic condition.        RAINBOW DRAW    Narrative:     The following orders were created for panel order [place] Draw.  Procedure                               Abnormality         Status                     ---------                               -----------         ------                     Green Top (Gel)[[id number]]                                  Final result               Lavender Top[[id number]]                                     Final result               Gold Top - SST[[id number]]                                   Final result               Light Blue Top[[id number]]                                   Final result                 Please view results for these tests on the individual orders.   COMPREHENSIVE METABOLIC PANEL     Narrative:     GFR Categories in Chronic Kidney Disease (CKD)              GFR Category          GFR (mL/min/1.73)    Interpretation  G1                    90 or greater        Normal or high (1)  G2                    60-89                Mild decrease (1)  G3a                   45-59                Mild to moderate decrease  G3b                   30-44                Moderate to severe decrease  G4                    15-29                Severe decrease  G5                    14 or less           Kidney failure    (1)In the absence of evidence of kidney disease, neither GFR category G1 or G2 fulfill the criteria for CKD.    eGFR calculation 2021 CKD-EPI creatinine equation, which does not include race as a factor   ETHANOL    Narrative:     Not for legal purposes.   GREEN TOP   LAVENDER TOP   GOLD TOP - SST   LIGHT BLUE TOP   CBC AND DIFFERENTIAL    Narrative:     The following orders were created for panel order CBC & Differential.  Procedure                               Abnormality         Status                     ---------                               -----------         ------                     CBC Auto Differential[[id number]]        Abnormal            Final result                 Please view results for these tests on the individual orders.     CT Abdomen Pelvis With Contrast  Result Date: 7/10/2025  Impression: 1.No acute intra-abdominal or intrapelvic process. 2.Ancillary findings as described above. Electronically Signed: Farhana Tan MD  7/10/2025 11:27 PM EDT  Workstation ID: [id number]    Medications   lactated ringers bolus 1,500 mL (0 mL Intravenous Stopped 7/11/25 0039)   HYDROmorphone (DILAUDID) injection 0.5 mg (0.5 mg Intravenous Given 7/10/25 2215)   iopamidol (ISOVUE-370) 76 % injection 100 mL (100 mL Intravenous Given 7/10/25 2321)   ondansetron (ZOFRAN) injection 4 mg (4 mg Intravenous Given 7/10/25 2355)   HYDROmorphone (DILAUDID) injection 0.25 mg (0.25 mg Intravenous Given 7/10/25  6361)                                                    Medical Decision Making  Patient is a 49-year-old female who presented with the above complaint.  She had the above evaluation and exam.  Patient was placed on the monitor and IV was established.    EKG independently interpreted by Dr. Eubanks and reviewed by myself.  Sinus rhythm, rate 80, QT interval 413.  No significant change as compared to prior on 9/20/2024.    Imaging independently interpreted by radiology and reviewed by myself.  No acute findings on CTA of abdomen or pelvis.    Labs: Troponin was less than 6.  Ethanol was negative.  UDS positive for THC.  Urinalysis showed trace bacteria with 3-5 white blood cells and 3-5 RBCs.  Patient is not having any urinary symptoms at this time.  Magnesium and phosphorus were normal.  Lipase 135.  CMP and CBC were unremarkable.  PT/INR and PTT were normal.    Patient was discussed with Dr. Brantley.    Patient was given IV fluids, Dilaudid, and Zofran in the ED with symptomatic improvement.  On reassessment, patient is in no acute distress and vital signs are normal.  Patient has no peritonitis on exam, and she has had no episodes of emesis in the ED.  She is hemodynamically stable at this time.  Patient has a gastroenterologist, and she was instructed to call them for a follow-up.  Return precautions were discussed.  Patient was discharged with a prescription for Zofran.  Patient verbalized agreement understanding with plan.    Problems Addressed:  Left upper quadrant abdominal pain: complicated acute illness or injury    Amount and/or Complexity of Data Reviewed  Labs: ordered.  Radiology: ordered.    Risk  Prescription drug management.        Final diagnoses:   Left upper quadrant abdominal pain       ED Disposition  ED Disposition       ED Disposition   Discharge    Condition   Stable    Comment   --               Anita Blanco MD  [address] [place] [address]  [phone]    Schedule an  appointment as soon as possible for a visit       [facility]  [address] [facility] [address] [phone]  Schedule an appointment as soon as possible for a visit            Medication List        Changed      * ondansetron ODT 4 MG disintegrating tablet  Commonly known as: ZOFRAN-ODT  Place 1 tablet under the tongue 4 (Four) Times a Day As Needed for Vomiting or Nausea.  What changed: Another medication with the same name was added. Make sure you understand how and when to take each.     * ondansetron ODT 4 MG disintegrating tablet  Commonly known as: ZOFRAN-ODT  Place 1 tablet on the tongue Every 8 (Eight) Hours As Needed for Nausea or Vomiting.  What changed: You were already taking a medication with the same name, and this prescription was added. Make sure you understand how and when to take each.           * This list has 2 medication(s) that are the same as other medications prescribed for you. Read the directions carefully, and ask your doctor or other care provider to review them with you.                   Where to Get Your Medications        These medications were sent to [facility] - [place], [place] - [address] - [phone] [facility] [phone]   [address] [place] [address]      Phone: [phone]   ondansetron ODT 4 MG disintegrating tablet            Velvet Keita PA-C  07/11/25 0219

## 2025-07-12 ENCOUNTER — TELEPHONE (OUTPATIENT)
Dept: FAMILY MEDICINE CLINIC | Facility: CLINIC | Age: 50
End: 2025-07-12
Payer: COMMERCIAL

## 2025-07-12 NOTE — TELEPHONE ENCOUNTER
Please call patient and make sure that she has a follow-up scheduled with [facility] or we would be glad to see her back if she is still having problems from pancreatitis that caused her to go to [facility]

## 2025-07-14 NOTE — TELEPHONE ENCOUNTER
Hub to relay    Please call patient and make sure that she has a follow-up scheduled with [facility] or we would be glad to see her back if she is still having problems from pancreatitis that caused her to go to [facility]

## 2025-07-14 NOTE — TELEPHONE ENCOUNTER
Per  verbal release authorization, detailed VM left with Providers comments. Instructed patient to call with any questions or concerns.    Estephania Escalona MA  07/14/25

## 2025-07-27 ENCOUNTER — APPOINTMENT (OUTPATIENT)
Dept: CT IMAGING | Facility: HOSPITAL | Age: 50
End: 2025-07-27
Payer: COMMERCIAL

## 2025-07-27 ENCOUNTER — HOSPITAL ENCOUNTER (EMERGENCY)
Facility: HOSPITAL | Age: 50
Discharge: HOME | End: 2025-07-27
Admitting: EMERGENCY MEDICINE
Payer: COMMERCIAL

## 2025-07-27 VITALS
HEIGHT: 64 IN | OXYGEN SATURATION: 100 % | RESPIRATION RATE: 20 BRPM | DIASTOLIC BLOOD PRESSURE: 74 MMHG | BODY MASS INDEX: 35.34 KG/M2 | SYSTOLIC BLOOD PRESSURE: 102 MMHG | TEMPERATURE: 97.7 F | HEART RATE: 60 BPM | WEIGHT: 207 LBS

## 2025-07-27 DIAGNOSIS — R11.0 NAUSEA: Primary | ICD-10-CM

## 2025-07-27 DIAGNOSIS — R10.13 EPIGASTRIC PAIN: ICD-10-CM

## 2025-07-27 LAB
ALBUMIN SERPL-MCNC: 4.3 G/DL (ref 3.5–5.2)
ALBUMIN/GLOB SERPL: 1.7 G/DL
ALP SERPL-CCNC: 96 U/L (ref 39–117)
ALT SERPL W P-5'-P-CCNC: 18 U/L (ref 1–33)
ANION GAP SERPL CALCULATED.3IONS-SCNC: 12 MMOL/L (ref 5–15)
AST SERPL-CCNC: 21 U/L (ref 1–32)
BASOPHILS # BLD AUTO: 0.02 10*3/MM3 (ref 0–0.2)
BASOPHILS NFR BLD AUTO: 0.3 % (ref 0–1.5)
BILIRUB SERPL-MCNC: 0.5 MG/DL (ref 0–1.2)
BILIRUB UR QL STRIP: NEGATIVE
BUN SERPL-MCNC: 10.6 MG/DL (ref 6–20)
BUN/CREAT SERPL: 12.3 (ref 7–25)
CALCIUM SPEC-SCNC: 9.5 MG/DL (ref 8.6–10.5)
CHLORIDE SERPL-SCNC: 107 MMOL/L (ref 98–107)
CLARITY UR: CLEAR
CO2 SERPL-SCNC: 24 MMOL/L (ref 22–29)
COLOR UR: YELLOW
CREAT SERPL-MCNC: 0.86 MG/DL (ref 0.57–1)
DEPRECATED RDW RBC AUTO: 41.9 FL (ref 37–54)
EGFRCR SERPLBLD CKD-EPI 2021: 84 ML/MIN/1.73
EOSINOPHIL # BLD AUTO: 0.09 10*3/MM3 (ref 0–0.4)
EOSINOPHIL NFR BLD AUTO: 1.3 % (ref 0.3–6.2)
ERYTHROCYTE [DISTWIDTH] IN BLOOD BY AUTOMATED COUNT: 12.6 % (ref 12.3–15.4)
GLOBULIN UR ELPH-MCNC: 2.6 GM/DL
GLUCOSE SERPL-MCNC: 88 MG/DL (ref 65–99)
GLUCOSE UR STRIP-MCNC: NEGATIVE MG/DL
HCT VFR BLD AUTO: 41.8 % (ref 34–46.6)
HGB BLD-MCNC: 13.6 G/DL (ref 12–15.9)
HGB UR QL STRIP.AUTO: NEGATIVE
IMM GRANULOCYTES # BLD AUTO: 0.02 10*3/MM3 (ref 0–0.05)
IMM GRANULOCYTES NFR BLD AUTO: 0.3 % (ref 0–0.5)
KETONES UR QL STRIP: NEGATIVE
LEUKOCYTE ESTERASE UR QL STRIP.AUTO: NEGATIVE
LIPASE SERPL-CCNC: 51 U/L (ref 13–60)
LYMPHOCYTES # BLD AUTO: 2.75 10*3/MM3 (ref 0.7–3.1)
LYMPHOCYTES NFR BLD AUTO: 39.9 % (ref 19.6–45.3)
MCH RBC QN AUTO: 29.4 PG (ref 26.6–33)
MCHC RBC AUTO-ENTMCNC: 32.5 G/DL (ref 31.5–35.7)
MCV RBC AUTO: 90.5 FL (ref 79–97)
MONOCYTES # BLD AUTO: 0.46 10*3/MM3 (ref 0.1–0.9)
MONOCYTES NFR BLD AUTO: 6.7 % (ref 5–12)
NEUTROPHILS NFR BLD AUTO: 3.55 10*3/MM3 (ref 1.7–7)
NEUTROPHILS NFR BLD AUTO: 51.5 % (ref 42.7–76)
NITRITE UR QL STRIP: NEGATIVE
NRBC BLD AUTO-RTO: 0 /100 WBC (ref 0–0.2)
PH UR STRIP.AUTO: 6.5 [PH] (ref 5–8)
PLATELET # BLD AUTO: 243 10*3/MM3 (ref 140–450)
PMV BLD AUTO: 10.4 FL (ref 6–12)
POTASSIUM SERPL-SCNC: 4.3 MMOL/L (ref 3.5–5.2)
PROT SERPL-MCNC: 6.9 G/DL (ref 6–8.5)
PROT UR QL STRIP: NEGATIVE
RBC # BLD AUTO: 4.62 10*6/MM3 (ref 3.77–5.28)
SODIUM SERPL-SCNC: 143 MMOL/L (ref 136–145)
SP GR UR STRIP: 1.02 (ref 1–1.03)
UROBILINOGEN UR QL STRIP: NORMAL
WBC NRBC COR # BLD AUTO: 6.89 10*3/MM3 (ref 3.4–10.8)

## 2025-07-27 PROCEDURE — 74177 CT ABD & PELVIS W/CONTRAST: CPT

## 2025-07-27 PROCEDURE — 25010000002 HYDROMORPHONE PER 4 MG: Performed by: PHYSICIAN ASSISTANT

## 2025-07-27 PROCEDURE — 99285 EMERGENCY DEPT VISIT HI MDM: CPT

## 2025-07-27 PROCEDURE — 80053 COMPREHEN METABOLIC PANEL: CPT | Performed by: PHYSICIAN ASSISTANT

## 2025-07-27 PROCEDURE — 96374 THER/PROPH/DIAG INJ IV PUSH: CPT

## 2025-07-27 PROCEDURE — 83690 ASSAY OF LIPASE: CPT | Performed by: PHYSICIAN ASSISTANT

## 2025-07-27 PROCEDURE — 81003 URINALYSIS AUTO W/O SCOPE: CPT | Performed by: PHYSICIAN ASSISTANT

## 2025-07-27 PROCEDURE — 85025 COMPLETE CBC W/AUTO DIFF WBC: CPT | Performed by: PHYSICIAN ASSISTANT

## 2025-07-27 PROCEDURE — 96375 TX/PRO/DX INJ NEW DRUG ADDON: CPT

## 2025-07-27 PROCEDURE — 25010000002 ONDANSETRON PER 1 MG: Performed by: PHYSICIAN ASSISTANT

## 2025-07-27 PROCEDURE — 25510000001 IOPAMIDOL PER 1 ML: Performed by: PHYSICIAN ASSISTANT

## 2025-07-27 RX ORDER — ONDANSETRON 2 MG/ML
8 INJECTION INTRAMUSCULAR; INTRAVENOUS ONCE
Status: COMPLETED | OUTPATIENT
Start: 2025-07-27 | End: 2025-07-27

## 2025-07-27 RX ORDER — HYDROCODONE BITARTRATE AND ACETAMINOPHEN 5; 325 MG/1; MG/1
1 TABLET ORAL EVERY 6 HOURS PRN
Qty: 12 TABLET | Refills: 0 | Status: SHIPPED | OUTPATIENT
Start: 2025-07-27

## 2025-07-27 RX ORDER — HYDROMORPHONE HYDROCHLORIDE 1 MG/ML
0.5 INJECTION, SOLUTION INTRAMUSCULAR; INTRAVENOUS; SUBCUTANEOUS ONCE
Refills: 0 | Status: COMPLETED | OUTPATIENT
Start: 2025-07-27 | End: 2025-07-27

## 2025-07-27 RX ORDER — IOPAMIDOL 755 MG/ML
100 INJECTION, SOLUTION INTRAVASCULAR
Status: COMPLETED | OUTPATIENT
Start: 2025-07-27 | End: 2025-07-27

## 2025-07-27 RX ADMIN — IOPAMIDOL 100 ML: 755 INJECTION, SOLUTION INTRAVENOUS at 12:43

## 2025-07-27 RX ADMIN — ONDANSETRON 8 MG: 2 INJECTION, SOLUTION INTRAMUSCULAR; INTRAVENOUS at 11:49

## 2025-07-27 RX ADMIN — HYDROMORPHONE HYDROCHLORIDE 0.5 MG: 1 INJECTION, SOLUTION INTRAMUSCULAR; INTRAVENOUS; SUBCUTANEOUS at 11:49

## 2025-07-27 NOTE — ED PROVIDER NOTES
Subjective   History of Present Illness  49-year-old female presents emergency room with 3-day history of increasing epigastric pain radiating around to the right side into the right back.  Patient states that she has history is of pancreatitis and has had the last flareup a few months ago.  Patient states she is currently on clear liquids and taking nausea medicines that she has at home.  Patient denies fevers or chills she denies vomiting or diarrhea.  She denies dysuria, frequency of urination or hematuria.        Review of Systems   Constitutional:  Negative for chills, fatigue and fever.   Respiratory:  Negative for cough and shortness of breath.    Cardiovascular:  Negative for chest pain.   Gastrointestinal:  Positive for abdominal pain and nausea. Negative for constipation, diarrhea and vomiting.   Genitourinary:  Negative for dysuria, frequency, hematuria and urgency.       Past Medical History:   Diagnosis Date    Endometriosis     Fibromyalgia     Hyperlipidemia     Hypertension        Allergies   Allergen Reactions    Roxicodone [Oxycodone Hcl] Itching and Nausea Only    Toradol [Ketorolac Tromethamine] Itching and Irritability    Gabapentin Nausea And Vomiting    Naproxen Nausea And Vomiting    Pregabalin Hives    Morphine Itching       Past Surgical History:   Procedure Laterality Date    ABDOMINAL SURGERY       SECTION      CHOLECYSTECTOMY WITH INTRAOPERATIVE CHOLANGIOGRAM N/A 2022    Procedure: CHOLECYSTECTOMY LAPAROSCOPIC INTRAOPERATIVE CHOLANGIOGRAM;  Surgeon: Robert Dale MD;  Location: [facility] MAIN OR;  Service: General;  Laterality: N/A;    COLONOSCOPY N/A 3/7/2023    Procedure: COLONOSCOPY with ileum and large intestine random biopsies R/O microscopic colitis;  Surgeon: NAVIN Schneider MD;  Location: [facility] ENDOSCOPY;  Service: Gastroenterology;  Laterality: N/A;  hemorrhoids    ENDOSCOPY N/A 2022    Procedure: ESOPHAGOGASTRODUODENOSCOPY WITH BIOPSY;  Surgeon: NAVIN Schneider  "MD Jono;  Location: [facility] ENDOSCOPY;  Service: Gastroenterology;  Laterality: N/A;    HYSTERECTOMY      UPPER ENDOSCOPIC ULTRASOUND W/ FNA N/A 12/4/2023    Procedure: ESOPHAGOGASTRODUODENOSCOPY WITH ENDOSCOPIC ULTRASOUND;  Surgeon: Malik King MD;  Location: [facility] ENDOSCOPY;  Service: Gastroenterology;  Laterality: N/A;  POST: CHRONIC PANCREATITIS       Family History   Problem Relation Age of Onset    Cancer Mother     Heart disease Father     Pancreatic cancer Sister        Social History     Socioeconomic History    Marital status:    Tobacco Use    Smoking status: Every Day     Current packs/day: 1.00     Average packs/day: 1 pack/day for 35.1 years (35.1 ttl pk-yrs)     Types: Cigarettes     Start date: 6/5/1990    Smokeless tobacco: Never   Vaping Use    Vaping status: Never Used   Substance and Sexual Activity    Alcohol use: Never    Drug use: Never    Sexual activity: Defer           Objective   Physical Exam  Vitals and nursing note reviewed.   Constitutional:       Appearance: She is well-developed.   HENT:      Head: Normocephalic and atraumatic.   Cardiovascular:      Rate and Rhythm: Normal rate and regular rhythm.   Pulmonary:      Effort: Pulmonary effort is normal.      Breath sounds: Normal breath sounds.   Abdominal:      General: Bowel sounds are normal. There is no distension.      Palpations: Abdomen is soft.      Tenderness: There is abdominal tenderness in the epigastric area. There is guarding and rebound.   Skin:     General: Skin is warm and dry.   Neurological:      General: No focal deficit present.      Mental Status: She is alert and oriented to person, place, and time.   Psychiatric:         Mood and Affect: Mood normal.         Behavior: Behavior normal.         Procedures           ED Course    /74   Pulse 60   Temp 97.7 °F (36.5 °C) (Oral)   Resp 20   Ht 162.6 cm (64.02\")   Wt 93.9 kg (207 lb)   SpO2 100%   BMI 35.51 kg/m²   Labs Reviewed   LIPASE - " Normal   URINALYSIS W/ CULTURE IF INDICATED - Normal    Narrative:     In absence of clinical symptoms, the presence of pyuria, bacteria, and/or nitrites on the urinalysis result does not correlate with infection.  Urine microscopic not indicated.   CBC WITH AUTO DIFFERENTIAL - Normal   COMPREHENSIVE METABOLIC PANEL    Narrative:     GFR Categories in Chronic Kidney Disease (CKD)              GFR Category          GFR (mL/min/1.73)    Interpretation  G1                    90 or greater        Normal or high (1)  G2                    60-89                Mild decrease (1)  G3a                   45-59                Mild to moderate decrease  G3b                   30-44                Moderate to severe decrease  G4                    15-29                Severe decrease  G5                    14 or less           Kidney failure    (1)In the absence of evidence of kidney disease, neither GFR category G1 or G2 fulfill the criteria for CKD.    eGFR calculation 2021 CKD-EPI creatinine equation, which does not include race as a factor   CBC AND DIFFERENTIAL    Narrative:     The following orders were created for panel order CBC & Differential.  Procedure                               Abnormality         Status                     ---------                               -----------         ------                     CBC Auto Differential[[id number]]        Normal              Final result                 Please view results for these tests on the individual orders.     Medications   HYDROmorphone (DILAUDID) injection 0.5 mg (0.5 mg Intravenous Given 7/27/25 1149)   ondansetron (ZOFRAN) injection 8 mg (8 mg Intravenous Given 7/27/25 1149)   iopamidol (ISOVUE-370) 76 % injection 100 mL (100 mL Intravenous Given 7/27/25 1243)     CT Abdomen Pelvis With Contrast  Result Date: 7/27/2025  No acute abnormality Electronically Signed: Mo Leo MD  7/27/2025 12:53 PM EDT  Workstation ID: [id number]                                                        Medical Decision Making  49-year-old female presents emergency room complaints of epigastric pain for the past 3 days.  Patient states she has a history of pancreatitis this is her normal symptoms she has been on a clear liquid diet for the past 3 days taking nausea medications that she was previously prescribed.  Patient denies fever chills she denies diarrhea, dysuria, frequency urination, hematuria.  Patient denies any URI symptoms.  Physical exam HEENT is normocephalic and nontraumatic lungs are clear to auscultation bilaterally, heart is regular rate and rhythm without murmur, abdomen diffuse tenderness to the epigastric region no rebound guarding or rigidity noted.  Normal bowel sounds all 4 quadrant.  Skin is warm and dry and extremities are nonedematous.  Vital signs BP is 102/74 temperature is 97.7 heart rate is 60 resp respirations are 20 SpO2 room air is 100%.  ER course CT of abdomen pelvis were obtained per radiologist shows no acute abnormality.  CBC was normal with a white count of 6.89 hemoglobin 13.6 hematocrit 41.8 platelets of 243.  Lipase is normal at 51.  CMP was also normal with a glucose of 88 BUN 10.6 creatinine 0.63 with a sodium of 143 and a potassium of 4.3.  ALT was normal at 18 AST was normal at 21 alk phos was also normal at 96.  Urinalysis was negative for infection.  ER course: Patient received Dilaudid 0.5 mg IM for pain Zofran 8 mg for nausea in the ER setting with good relief of pain.  Patient was given her normal workup information CT results were also negative all of this was explained in depth all questions were answered patient was given the opportunity to be admitted for observation or go home patient decided to be treated as an outpatient she has Zofran at home so I sent pain medicine to her pharmacy, she was discharged home in stable condition to return to the emergency department for any worsening symptoms.    Problems Addressed:  Epigastric pain:  complicated acute illness or injury  Nausea: complicated acute illness or injury    Amount and/or Complexity of Data Reviewed  Labs: ordered.  Radiology: ordered.    Risk  Prescription drug management.        Final diagnoses:   Nausea   Epigastric pain       ED Disposition  ED Disposition       ED Disposition   Discharge    Condition   Stable    Comment   --               Anita Blanco MD  [address] [facility] [place] [address] [phone]    Schedule an appointment as soon as possible for a visit       [facility] EM[facility]GENCY DEPARTMENT  [address] [facility] [address] [phone]    If symptoms worsen         Medication List        Changed      * HYDROcodone-acetaminophen 5-325 MG per tablet  Commonly known as: NORCO  Take 1 tablet by mouth Every 6 (Six) Hours As Needed for Moderate Pain or Severe Pain.  What changed: Another medication with the same name was added. Make sure you understand how and when to take each.     * HYDROcodone-acetaminophen 5-325 MG per tablet  Commonly known as: NORCO  Take 1 tablet by mouth Every 6 (Six) Hours As Needed for Moderate Pain.  What changed: You were already taking a medication with the same name, and this prescription was added. Make sure you understand how and when to take each.           * This list has 2 medication(s) that are the same as other medications prescribed for you. Read the directions carefully, and ask your doctor or other care provider to review them with you.                   Where to Get Your Medications        These medications were sent to [facility] - [place], [place] - [address] [phone] [facility] [phone]   [address] [place] [address]      Phone: [phone]   HYDROcodone-acetaminophen 5-325 MG per tablet            Saul Diane PA-C  07/27/25 9165

## 2025-07-27 NOTE — DISCHARGE INSTRUCTIONS
Continue Zofran at home  Continue clear liquid diet  Pain medications been sent to your pharmacy  If any symptoms worsen please return to the emergency room.

## 2025-07-28 ENCOUNTER — TELEPHONE (OUTPATIENT)
Dept: FAMILY MEDICINE CLINIC | Facility: CLINIC | Age: 50
End: 2025-07-28
Payer: COMMERCIAL

## 2025-07-28 NOTE — TELEPHONE ENCOUNTER
Please call patient and make sure she is doing better from the pancreatitis that caused her to go to [place] emergency room.

## 2025-07-28 NOTE — TELEPHONE ENCOUNTER
Per  verbal release authorization, detailed VM left with Provider's comments. Instructed patient to call with any questions or concerns.    Estephania Escalona MA  07/28/25